# Patient Record
Sex: FEMALE | Race: WHITE | NOT HISPANIC OR LATINO | URBAN - METROPOLITAN AREA
[De-identification: names, ages, dates, MRNs, and addresses within clinical notes are randomized per-mention and may not be internally consistent; named-entity substitution may affect disease eponyms.]

---

## 2017-01-10 ENCOUNTER — OFFICE VISIT - HEALTHEAST (OUTPATIENT)
Dept: GERIATRICS | Facility: CLINIC | Age: 82
End: 2017-01-10

## 2017-01-10 DIAGNOSIS — N18.4 CKD (CHRONIC KIDNEY DISEASE), STAGE 4 (SEVERE): ICD-10-CM

## 2017-01-10 DIAGNOSIS — D64.9 ANEMIA, UNSPECIFIED TYPE: ICD-10-CM

## 2017-01-10 DIAGNOSIS — R35.0 URINARY FREQUENCY: ICD-10-CM

## 2017-01-10 DIAGNOSIS — E11.9 DIABETES MELLITUS (H): ICD-10-CM

## 2017-01-10 DIAGNOSIS — F31.9 BIPOLAR DISORDER (H): ICD-10-CM

## 2017-01-10 DIAGNOSIS — R60.0 BILATERAL EDEMA OF LOWER EXTREMITY: ICD-10-CM

## 2017-01-10 DIAGNOSIS — I10 ESSENTIAL HYPERTENSION: ICD-10-CM

## 2017-03-06 ENCOUNTER — OFFICE VISIT - HEALTHEAST (OUTPATIENT)
Dept: GERIATRICS | Facility: CLINIC | Age: 82
End: 2017-03-06

## 2017-03-06 DIAGNOSIS — F31.9 BIPOLAR DISORDER (H): ICD-10-CM

## 2017-03-06 DIAGNOSIS — E11.9 DIABETES MELLITUS (H): ICD-10-CM

## 2017-03-06 DIAGNOSIS — I10 ESSENTIAL HYPERTENSION: ICD-10-CM

## 2017-03-06 DIAGNOSIS — N18.4 CKD (CHRONIC KIDNEY DISEASE), STAGE 4 (SEVERE): ICD-10-CM

## 2017-03-06 DIAGNOSIS — D64.9 ANEMIA: ICD-10-CM

## 2017-03-06 DIAGNOSIS — R60.0 LEG EDEMA: ICD-10-CM

## 2017-03-06 DIAGNOSIS — M10.9 GOUT: ICD-10-CM

## 2017-04-10 ENCOUNTER — OFFICE VISIT - HEALTHEAST (OUTPATIENT)
Dept: BEHAVIORAL HEALTH | Facility: HOSPITAL | Age: 82
End: 2017-04-10

## 2017-04-10 DIAGNOSIS — F31.9 BIPOLAR 1 DISORDER (H): ICD-10-CM

## 2017-04-10 DIAGNOSIS — E11.9 DM (DIABETES MELLITUS) (H): ICD-10-CM

## 2017-04-10 DIAGNOSIS — F30.4 BIPOLAR I DISORDER, SINGLE MANIC EPISODE, IN FULL REMISSION (H): ICD-10-CM

## 2017-04-10 DIAGNOSIS — F43.21 ADJUSTMENT DISORDER WITH DEPRESSED MOOD: ICD-10-CM

## 2017-05-01 ENCOUNTER — OFFICE VISIT - HEALTHEAST (OUTPATIENT)
Dept: GERIATRICS | Facility: CLINIC | Age: 82
End: 2017-05-01

## 2017-05-01 DIAGNOSIS — E11.9 DIABETES MELLITUS (H): ICD-10-CM

## 2017-05-15 ENCOUNTER — OFFICE VISIT - HEALTHEAST (OUTPATIENT)
Dept: GERIATRICS | Facility: CLINIC | Age: 82
End: 2017-05-15

## 2017-05-15 DIAGNOSIS — N18.4 CKD (CHRONIC KIDNEY DISEASE), STAGE 4 (SEVERE): ICD-10-CM

## 2017-05-15 DIAGNOSIS — D64.9 ANEMIA, UNSPECIFIED TYPE: ICD-10-CM

## 2017-05-15 DIAGNOSIS — E11.9 DIABETES MELLITUS (H): ICD-10-CM

## 2017-05-15 DIAGNOSIS — R60.0 BILATERAL EDEMA OF LOWER EXTREMITY: ICD-10-CM

## 2017-05-15 DIAGNOSIS — F31.9 BIPOLAR DISORDER (H): ICD-10-CM

## 2017-05-15 DIAGNOSIS — I10 ESSENTIAL HYPERTENSION WITH GOAL BLOOD PRESSURE LESS THAN 140/90: ICD-10-CM

## 2017-06-05 ENCOUNTER — OFFICE VISIT - HEALTHEAST (OUTPATIENT)
Dept: GERIATRICS | Facility: CLINIC | Age: 82
End: 2017-06-05

## 2017-06-05 DIAGNOSIS — N18.4 CKD (CHRONIC KIDNEY DISEASE), STAGE 4 (SEVERE): ICD-10-CM

## 2017-06-05 DIAGNOSIS — D64.9 ANEMIA: ICD-10-CM

## 2017-06-05 DIAGNOSIS — M10.9 GOUT: ICD-10-CM

## 2017-06-05 DIAGNOSIS — R60.0 BILATERAL EDEMA OF LOWER EXTREMITY: ICD-10-CM

## 2017-06-05 DIAGNOSIS — I10 ESSENTIAL HYPERTENSION: ICD-10-CM

## 2017-06-05 DIAGNOSIS — E11.9 DIABETES MELLITUS (H): ICD-10-CM

## 2017-06-05 DIAGNOSIS — F31.9 BIPOLAR DISORDER (H): ICD-10-CM

## 2017-07-11 ENCOUNTER — OFFICE VISIT - HEALTHEAST (OUTPATIENT)
Dept: GERIATRICS | Facility: CLINIC | Age: 82
End: 2017-07-11

## 2017-07-11 DIAGNOSIS — L57.0 ACTINIC KERATOSIS: ICD-10-CM

## 2017-07-11 DIAGNOSIS — D64.9 ANEMIA: ICD-10-CM

## 2017-07-11 DIAGNOSIS — N18.4 CKD (CHRONIC KIDNEY DISEASE), STAGE 4 (SEVERE): ICD-10-CM

## 2017-07-11 DIAGNOSIS — M10.9 GOUT: ICD-10-CM

## 2017-07-11 DIAGNOSIS — I10 ESSENTIAL HYPERTENSION: ICD-10-CM

## 2017-07-11 DIAGNOSIS — E11.9 DIABETES MELLITUS (H): ICD-10-CM

## 2017-07-11 DIAGNOSIS — F31.9 BIPOLAR DISORDER (H): ICD-10-CM

## 2017-07-11 DIAGNOSIS — R60.0 BILATERAL EDEMA OF LOWER EXTREMITY: ICD-10-CM

## 2017-07-11 DIAGNOSIS — I10 HYPERTENSION: ICD-10-CM

## 2017-07-31 ENCOUNTER — RECORDS - HEALTHEAST (OUTPATIENT)
Dept: LAB | Facility: CLINIC | Age: 82
End: 2017-07-31

## 2017-07-31 LAB — HBA1C MFR BLD: 7 % (ref 4.2–6.1)

## 2017-08-18 ENCOUNTER — OFFICE VISIT - HEALTHEAST (OUTPATIENT)
Dept: GERIATRICS | Facility: CLINIC | Age: 82
End: 2017-08-18

## 2017-08-18 DIAGNOSIS — E11.9 DIABETES MELLITUS (H): ICD-10-CM

## 2017-08-18 DIAGNOSIS — D64.9 ANEMIA, UNSPECIFIED TYPE: ICD-10-CM

## 2017-08-18 DIAGNOSIS — R60.0 BILATERAL EDEMA OF LOWER EXTREMITY: ICD-10-CM

## 2017-08-18 DIAGNOSIS — N18.4 CKD (CHRONIC KIDNEY DISEASE), STAGE 4 (SEVERE): ICD-10-CM

## 2017-08-18 DIAGNOSIS — F31.9 BIPOLAR DISORDER (H): ICD-10-CM

## 2017-08-18 DIAGNOSIS — I10 ESSENTIAL HYPERTENSION: ICD-10-CM

## 2017-09-25 ENCOUNTER — OFFICE VISIT - HEALTHEAST (OUTPATIENT)
Dept: GERIATRICS | Facility: CLINIC | Age: 82
End: 2017-09-25

## 2017-09-25 DIAGNOSIS — N18.4 CKD (CHRONIC KIDNEY DISEASE), STAGE 4 (SEVERE): ICD-10-CM

## 2017-09-25 DIAGNOSIS — E11.9 DIABETES MELLITUS (H): ICD-10-CM

## 2017-09-25 DIAGNOSIS — F31.9 BIPOLAR DISORDER (H): ICD-10-CM

## 2017-09-25 DIAGNOSIS — I10 HYPERTENSION: ICD-10-CM

## 2017-09-25 DIAGNOSIS — L57.0 ACTINIC KERATOSIS: ICD-10-CM

## 2017-09-25 DIAGNOSIS — M19.90 DJD (DEGENERATIVE JOINT DISEASE): ICD-10-CM

## 2017-09-25 DIAGNOSIS — M10.9 GOUT: ICD-10-CM

## 2017-09-25 DIAGNOSIS — R60.0 BILATERAL EDEMA OF LOWER EXTREMITY: ICD-10-CM

## 2017-10-09 ENCOUNTER — OFFICE VISIT - HEALTHEAST (OUTPATIENT)
Dept: BEHAVIORAL HEALTH | Facility: HOSPITAL | Age: 82
End: 2017-10-09

## 2017-10-09 DIAGNOSIS — F30.4 BIPOLAR I DISORDER, SINGLE MANIC EPISODE, IN FULL REMISSION (H): ICD-10-CM

## 2017-10-09 DIAGNOSIS — F31.9 BIPOLAR 1 DISORDER (H): ICD-10-CM

## 2017-10-09 DIAGNOSIS — F31.11 BIPOLAR 1 DISORDER, MANIC, MILD (H): ICD-10-CM

## 2017-10-25 ENCOUNTER — OFFICE VISIT - HEALTHEAST (OUTPATIENT)
Dept: GERIATRICS | Facility: CLINIC | Age: 82
End: 2017-10-25

## 2017-10-25 DIAGNOSIS — M25.569 KNEE PAIN: ICD-10-CM

## 2017-10-25 DIAGNOSIS — I10 HYPERTENSION: ICD-10-CM

## 2017-10-25 DIAGNOSIS — M19.90 DJD (DEGENERATIVE JOINT DISEASE): ICD-10-CM

## 2017-10-25 DIAGNOSIS — N18.4 CHRONIC KIDNEY DISEASE, STAGE IV (SEVERE) (H): ICD-10-CM

## 2017-10-25 DIAGNOSIS — M10.9 GOUT: ICD-10-CM

## 2017-10-25 DIAGNOSIS — R60.0 BILATERAL EDEMA OF LOWER EXTREMITY: ICD-10-CM

## 2017-10-25 DIAGNOSIS — E11.9 DIABETES MELLITUS (H): ICD-10-CM

## 2017-10-25 DIAGNOSIS — F31.9 BIPOLAR DISORDER (H): ICD-10-CM

## 2017-11-22 ENCOUNTER — OFFICE VISIT - HEALTHEAST (OUTPATIENT)
Dept: GERIATRICS | Facility: CLINIC | Age: 82
End: 2017-11-22

## 2017-11-22 DIAGNOSIS — R60.0 BILATERAL EDEMA OF LOWER EXTREMITY: ICD-10-CM

## 2017-11-22 DIAGNOSIS — M19.90 DJD (DEGENERATIVE JOINT DISEASE): ICD-10-CM

## 2017-11-22 DIAGNOSIS — E11.9 DIABETES MELLITUS (H): ICD-10-CM

## 2017-11-22 DIAGNOSIS — M25.569 KNEE PAIN: ICD-10-CM

## 2017-11-22 DIAGNOSIS — F31.9 BIPOLAR DISORDER (H): ICD-10-CM

## 2017-11-22 DIAGNOSIS — I10 HYPERTENSION: ICD-10-CM

## 2017-12-04 ENCOUNTER — RECORDS - HEALTHEAST (OUTPATIENT)
Dept: LAB | Facility: CLINIC | Age: 82
End: 2017-12-04

## 2017-12-04 LAB — HBA1C MFR BLD: 6.6 % (ref 4.2–6.1)

## 2017-12-11 ENCOUNTER — OFFICE VISIT - HEALTHEAST (OUTPATIENT)
Dept: GERIATRICS | Facility: CLINIC | Age: 82
End: 2017-12-11

## 2017-12-11 DIAGNOSIS — M10.9 GOUT: ICD-10-CM

## 2017-12-11 DIAGNOSIS — F31.9 BIPOLAR DISORDER (H): ICD-10-CM

## 2017-12-11 DIAGNOSIS — M25.569 KNEE PAIN: ICD-10-CM

## 2017-12-11 DIAGNOSIS — E11.9 DIABETES MELLITUS (H): ICD-10-CM

## 2017-12-11 DIAGNOSIS — N18.4 CHRONIC KIDNEY DISEASE, STAGE IV (SEVERE) (H): ICD-10-CM

## 2017-12-11 DIAGNOSIS — M19.90 DJD (DEGENERATIVE JOINT DISEASE): ICD-10-CM

## 2017-12-11 DIAGNOSIS — I10 HYPERTENSION: ICD-10-CM

## 2018-02-12 ENCOUNTER — OFFICE VISIT - HEALTHEAST (OUTPATIENT)
Dept: GERIATRICS | Facility: CLINIC | Age: 83
End: 2018-02-12

## 2018-02-12 DIAGNOSIS — F31.9 BIPOLAR DISORDER (H): ICD-10-CM

## 2018-02-12 DIAGNOSIS — I10 ESSENTIAL HYPERTENSION: ICD-10-CM

## 2018-02-12 DIAGNOSIS — M19.90 DJD (DEGENERATIVE JOINT DISEASE): ICD-10-CM

## 2018-02-12 DIAGNOSIS — I10 HYPERTENSION: ICD-10-CM

## 2018-02-12 DIAGNOSIS — E11.9 DIABETES MELLITUS (H): ICD-10-CM

## 2018-02-12 DIAGNOSIS — M10.9 GOUT: ICD-10-CM

## 2018-02-12 DIAGNOSIS — N18.4 CHRONIC KIDNEY DISEASE, STAGE IV (SEVERE) (H): ICD-10-CM

## 2018-02-12 DIAGNOSIS — M25.569 KNEE PAIN: ICD-10-CM

## 2018-02-17 ENCOUNTER — RECORDS - HEALTHEAST (OUTPATIENT)
Dept: LAB | Facility: CLINIC | Age: 83
End: 2018-02-17

## 2018-02-19 LAB
ANION GAP SERPL CALCULATED.3IONS-SCNC: 8 MMOL/L (ref 5–18)
BUN SERPL-MCNC: 34 MG/DL (ref 8–28)
CALCIUM SERPL-MCNC: 8.8 MG/DL (ref 8.5–10.5)
CHLORIDE BLD-SCNC: 108 MMOL/L (ref 98–107)
CO2 SERPL-SCNC: 27 MMOL/L (ref 22–31)
CREAT SERPL-MCNC: 1.75 MG/DL (ref 0.6–1.1)
ERYTHROCYTE [DISTWIDTH] IN BLOOD BY AUTOMATED COUNT: 14.2 % (ref 11–14.5)
GFR SERPL CREATININE-BSD FRML MDRD: 28 ML/MIN/1.73M2
GLUCOSE BLD-MCNC: 86 MG/DL (ref 70–125)
HBA1C MFR BLD: 6.6 % (ref 4.2–6.1)
HCT VFR BLD AUTO: 33.8 % (ref 35–47)
HGB BLD-MCNC: 10.2 G/DL (ref 12–16)
MCH RBC QN AUTO: 31.1 PG (ref 27–34)
MCHC RBC AUTO-ENTMCNC: 30.2 G/DL (ref 32–36)
MCV RBC AUTO: 103 FL (ref 80–100)
PLATELET # BLD AUTO: 190 THOU/UL (ref 140–440)
PMV BLD AUTO: 11.8 FL (ref 8.5–12.5)
POTASSIUM BLD-SCNC: 5.3 MMOL/L (ref 3.5–5)
RBC # BLD AUTO: 3.28 MILL/UL (ref 3.8–5.4)
SODIUM SERPL-SCNC: 143 MMOL/L (ref 136–145)
WBC: 8.7 THOU/UL (ref 4–11)

## 2018-02-22 ENCOUNTER — RECORDS - HEALTHEAST (OUTPATIENT)
Dept: LAB | Facility: CLINIC | Age: 83
End: 2018-02-22

## 2018-02-22 LAB — POTASSIUM BLD-SCNC: 5.1 MMOL/L (ref 3.5–5)

## 2018-03-07 ENCOUNTER — RECORDS - HEALTHEAST (OUTPATIENT)
Dept: LAB | Facility: CLINIC | Age: 83
End: 2018-03-07

## 2018-03-08 LAB — POTASSIUM BLD-SCNC: 4.8 MMOL/L (ref 3.5–5)

## 2018-03-14 ENCOUNTER — OFFICE VISIT - HEALTHEAST (OUTPATIENT)
Dept: GERIATRICS | Facility: CLINIC | Age: 83
End: 2018-03-14

## 2018-03-14 DIAGNOSIS — B35.3 ATHLETE'S FOOT ON RIGHT: ICD-10-CM

## 2018-03-21 ENCOUNTER — OFFICE VISIT - HEALTHEAST (OUTPATIENT)
Dept: GERIATRICS | Facility: CLINIC | Age: 83
End: 2018-03-21

## 2018-03-21 DIAGNOSIS — F31.9 BIPOLAR DISORDER (H): ICD-10-CM

## 2018-03-21 DIAGNOSIS — B35.3 ATHLETE'S FOOT ON RIGHT: ICD-10-CM

## 2018-03-21 DIAGNOSIS — I10 ESSENTIAL HYPERTENSION: ICD-10-CM

## 2018-03-21 DIAGNOSIS — R60.0 BILATERAL EDEMA OF LOWER EXTREMITY: ICD-10-CM

## 2018-03-21 DIAGNOSIS — N18.4 CHRONIC KIDNEY DISEASE, STAGE IV (SEVERE) (H): ICD-10-CM

## 2018-03-21 DIAGNOSIS — M10.9 GOUT: ICD-10-CM

## 2018-03-21 DIAGNOSIS — E11.9 DIABETES MELLITUS (H): ICD-10-CM

## 2018-04-02 ENCOUNTER — OFFICE VISIT - HEALTHEAST (OUTPATIENT)
Dept: GERIATRICS | Facility: CLINIC | Age: 83
End: 2018-04-02

## 2018-04-02 DIAGNOSIS — B35.3 ATHLETE'S FOOT ON RIGHT: ICD-10-CM

## 2018-04-02 DIAGNOSIS — E11.9 DIABETES MELLITUS (H): ICD-10-CM

## 2018-04-02 DIAGNOSIS — R60.0 BILATERAL EDEMA OF LOWER EXTREMITY: ICD-10-CM

## 2018-04-09 ENCOUNTER — OFFICE VISIT - HEALTHEAST (OUTPATIENT)
Dept: BEHAVIORAL HEALTH | Facility: HOSPITAL | Age: 83
End: 2018-04-09

## 2018-04-09 DIAGNOSIS — F30.4 BIPOLAR I DISORDER, SINGLE MANIC EPISODE, IN FULL REMISSION (H): ICD-10-CM

## 2018-04-09 DIAGNOSIS — F31.9 BIPOLAR 1 DISORDER (H): ICD-10-CM

## 2018-04-09 DIAGNOSIS — F31.11 BIPOLAR 1 DISORDER, MANIC, MILD (H): ICD-10-CM

## 2018-04-11 ENCOUNTER — OFFICE VISIT - HEALTHEAST (OUTPATIENT)
Dept: GERIATRICS | Facility: CLINIC | Age: 83
End: 2018-04-11

## 2018-04-11 DIAGNOSIS — B35.3 ATHLETE'S FOOT ON RIGHT: ICD-10-CM

## 2018-05-04 ENCOUNTER — AMBULATORY - HEALTHEAST (OUTPATIENT)
Dept: GERIATRICS | Facility: CLINIC | Age: 83
End: 2018-05-04

## 2018-05-31 ENCOUNTER — RECORDS - HEALTHEAST (OUTPATIENT)
Dept: LAB | Facility: CLINIC | Age: 83
End: 2018-05-31

## 2018-05-31 LAB
ALBUMIN UR-MCNC: ABNORMAL MG/DL
APPEARANCE UR: CLEAR
BACTERIA #/AREA URNS HPF: ABNORMAL HPF
BILIRUB UR QL STRIP: NEGATIVE
COLOR UR AUTO: YELLOW
GLUCOSE UR STRIP-MCNC: NEGATIVE MG/DL
HGB UR QL STRIP: ABNORMAL
KETONES UR STRIP-MCNC: NEGATIVE MG/DL
LEUKOCYTE ESTERASE UR QL STRIP: ABNORMAL
MUCOUS THREADS #/AREA URNS LPF: ABNORMAL LPF
NITRATE UR QL: NEGATIVE
PH UR STRIP: 5.5 [PH] (ref 4.5–8)
RBC #/AREA URNS AUTO: ABNORMAL HPF
SP GR UR STRIP: 1.02 (ref 1–1.03)
SQUAMOUS #/AREA URNS AUTO: ABNORMAL LPF
UROBILINOGEN UR STRIP-ACNC: ABNORMAL
WBC #/AREA URNS AUTO: ABNORMAL HPF
WBC CLUMPS #/AREA URNS HPF: PRESENT /[HPF]

## 2018-06-01 ENCOUNTER — RECORDS - HEALTHEAST (OUTPATIENT)
Dept: LAB | Facility: CLINIC | Age: 83
End: 2018-06-01

## 2018-06-01 ENCOUNTER — AMBULATORY - HEALTHEAST (OUTPATIENT)
Dept: GERIATRICS | Facility: CLINIC | Age: 83
End: 2018-06-01

## 2018-06-01 LAB
ANION GAP SERPL CALCULATED.3IONS-SCNC: 9 MMOL/L (ref 5–18)
BUN SERPL-MCNC: 40 MG/DL (ref 8–28)
CALCIUM SERPL-MCNC: 9 MG/DL (ref 8.5–10.5)
CHLORIDE BLD-SCNC: 110 MMOL/L (ref 98–107)
CO2 SERPL-SCNC: 27 MMOL/L (ref 22–31)
CREAT SERPL-MCNC: 1.89 MG/DL (ref 0.6–1.1)
ERYTHROCYTE [DISTWIDTH] IN BLOOD BY AUTOMATED COUNT: 13.3 % (ref 11–14.5)
GFR SERPL CREATININE-BSD FRML MDRD: 25 ML/MIN/1.73M2
GLUCOSE BLD-MCNC: 86 MG/DL (ref 70–125)
HCT VFR BLD AUTO: 32.3 % (ref 35–47)
HGB BLD-MCNC: 10.1 G/DL (ref 12–16)
MCH RBC QN AUTO: 31.8 PG (ref 27–34)
MCHC RBC AUTO-ENTMCNC: 31.3 G/DL (ref 32–36)
MCV RBC AUTO: 102 FL (ref 80–100)
PLATELET # BLD AUTO: 208 THOU/UL (ref 140–440)
PMV BLD AUTO: 11.7 FL (ref 8.5–12.5)
POTASSIUM BLD-SCNC: 4.7 MMOL/L (ref 3.5–5)
RBC # BLD AUTO: 3.18 MILL/UL (ref 3.8–5.4)
SODIUM SERPL-SCNC: 146 MMOL/L (ref 136–145)
TSH SERPL DL<=0.005 MIU/L-ACNC: 1.68 UIU/ML (ref 0.3–5)
WBC: 8.8 THOU/UL (ref 4–11)

## 2018-06-03 LAB — BACTERIA SPEC CULT: ABNORMAL

## 2018-06-11 ENCOUNTER — OFFICE VISIT - HEALTHEAST (OUTPATIENT)
Dept: GERIATRICS | Facility: CLINIC | Age: 83
End: 2018-06-11

## 2018-06-11 DIAGNOSIS — B35.3 ATHLETE'S FOOT ON RIGHT: ICD-10-CM

## 2018-06-11 DIAGNOSIS — N39.0 URINARY TRACT INFECTION: ICD-10-CM

## 2018-06-22 ENCOUNTER — COMMUNICATION - HEALTHEAST (OUTPATIENT)
Dept: BEHAVIORAL HEALTH | Facility: CLINIC | Age: 83
End: 2018-06-22

## 2018-06-22 DIAGNOSIS — F31.9 BIPOLAR AFFECTIVE DISORDER (H): ICD-10-CM

## 2018-06-27 ENCOUNTER — COMMUNICATION - HEALTHEAST (OUTPATIENT)
Dept: BEHAVIORAL HEALTH | Facility: CLINIC | Age: 83
End: 2018-06-27

## 2018-07-30 ENCOUNTER — OFFICE VISIT - HEALTHEAST (OUTPATIENT)
Dept: GERIATRICS | Facility: CLINIC | Age: 83
End: 2018-07-30

## 2018-07-30 DIAGNOSIS — R60.0 BILATERAL EDEMA OF LOWER EXTREMITY: ICD-10-CM

## 2018-07-30 DIAGNOSIS — L97.511 SKIN ULCER OF RIGHT FOOT, LIMITED TO BREAKDOWN OF SKIN (H): ICD-10-CM

## 2018-07-30 DIAGNOSIS — E11.9 DIABETES MELLITUS (H): ICD-10-CM

## 2018-08-13 ENCOUNTER — OFFICE VISIT - HEALTHEAST (OUTPATIENT)
Dept: GERIATRICS | Facility: CLINIC | Age: 83
End: 2018-08-13

## 2018-08-13 DIAGNOSIS — E11.9 DIABETES MELLITUS (H): ICD-10-CM

## 2018-08-13 DIAGNOSIS — L97.511 SKIN ULCER OF RIGHT FOOT, LIMITED TO BREAKDOWN OF SKIN (H): ICD-10-CM

## 2018-08-13 DIAGNOSIS — B35.3 TINEA PEDIS: ICD-10-CM

## 2018-08-27 ENCOUNTER — OFFICE VISIT - HEALTHEAST (OUTPATIENT)
Dept: GERIATRICS | Facility: CLINIC | Age: 83
End: 2018-08-27

## 2018-08-27 ENCOUNTER — OFFICE VISIT - HEALTHEAST (OUTPATIENT)
Dept: BEHAVIORAL HEALTH | Facility: HOSPITAL | Age: 83
End: 2018-08-27

## 2018-08-27 ENCOUNTER — RECORDS - HEALTHEAST (OUTPATIENT)
Dept: LAB | Facility: CLINIC | Age: 83
End: 2018-08-27

## 2018-08-27 DIAGNOSIS — L97.511 SKIN ULCER OF RIGHT FOOT, LIMITED TO BREAKDOWN OF SKIN (H): ICD-10-CM

## 2018-08-27 DIAGNOSIS — B35.3 TINEA PEDIS: ICD-10-CM

## 2018-08-27 DIAGNOSIS — F31.11 BIPOLAR 1 DISORDER, MANIC, MILD (H): ICD-10-CM

## 2018-08-27 DIAGNOSIS — F30.4 BIPOLAR I DISORDER, SINGLE MANIC EPISODE, IN FULL REMISSION (H): ICD-10-CM

## 2018-08-29 ENCOUNTER — OFFICE VISIT - HEALTHEAST (OUTPATIENT)
Dept: GERIATRICS | Facility: CLINIC | Age: 83
End: 2018-08-29

## 2018-08-29 DIAGNOSIS — B35.3 TINEA PEDIS: ICD-10-CM

## 2018-08-29 DIAGNOSIS — E11.9 DIABETES MELLITUS (H): ICD-10-CM

## 2018-08-29 DIAGNOSIS — L97.511 SKIN ULCER OF RIGHT FOOT, LIMITED TO BREAKDOWN OF SKIN (H): ICD-10-CM

## 2018-08-29 LAB — BACTERIA SPEC CULT: ABNORMAL

## 2018-08-30 ENCOUNTER — OFFICE VISIT - HEALTHEAST (OUTPATIENT)
Dept: GERIATRICS | Facility: CLINIC | Age: 83
End: 2018-08-30

## 2018-08-30 DIAGNOSIS — N18.4 CHRONIC KIDNEY DISEASE, STAGE IV (SEVERE) (H): ICD-10-CM

## 2018-08-30 DIAGNOSIS — L97.511 SKIN ULCER OF RIGHT FOOT, LIMITED TO BREAKDOWN OF SKIN (H): ICD-10-CM

## 2018-08-30 DIAGNOSIS — R60.0 BILATERAL EDEMA OF LOWER EXTREMITY: ICD-10-CM

## 2018-08-30 DIAGNOSIS — E11.9 DIABETES MELLITUS (H): ICD-10-CM

## 2018-08-30 DIAGNOSIS — F31.9 BIPOLAR DISORDER (H): ICD-10-CM

## 2018-08-30 DIAGNOSIS — B35.3 TINEA PEDIS: ICD-10-CM

## 2018-09-05 ENCOUNTER — OFFICE VISIT - HEALTHEAST (OUTPATIENT)
Dept: GERIATRICS | Facility: CLINIC | Age: 83
End: 2018-09-05

## 2018-09-05 ENCOUNTER — RECORDS - HEALTHEAST (OUTPATIENT)
Dept: LAB | Facility: CLINIC | Age: 83
End: 2018-09-05

## 2018-09-05 DIAGNOSIS — L08.9 DIABETIC FOOT INFECTION (H): ICD-10-CM

## 2018-09-05 DIAGNOSIS — E11.628 DIABETIC FOOT INFECTION (H): ICD-10-CM

## 2018-09-05 DIAGNOSIS — L97.511 SKIN ULCER OF RIGHT FOOT, LIMITED TO BREAKDOWN OF SKIN (H): ICD-10-CM

## 2018-09-05 DIAGNOSIS — B35.3 TINEA PEDIS: ICD-10-CM

## 2018-09-06 LAB
ANION GAP SERPL CALCULATED.3IONS-SCNC: 6 MMOL/L (ref 5–18)
BUN SERPL-MCNC: 40 MG/DL (ref 8–28)
CALCIUM SERPL-MCNC: 9 MG/DL (ref 8.5–10.5)
CHLORIDE BLD-SCNC: 112 MMOL/L (ref 98–107)
CO2 SERPL-SCNC: 27 MMOL/L (ref 22–31)
CREAT SERPL-MCNC: 1.64 MG/DL (ref 0.6–1.1)
GFR SERPL CREATININE-BSD FRML MDRD: 30 ML/MIN/1.73M2
GLUCOSE BLD-MCNC: 100 MG/DL (ref 70–125)
HBA1C MFR BLD: 6.7 % (ref 4.2–6.1)
POTASSIUM BLD-SCNC: 4.6 MMOL/L (ref 3.5–5)
SODIUM SERPL-SCNC: 145 MMOL/L (ref 136–145)

## 2018-09-07 ENCOUNTER — COMMUNICATION - HEALTHEAST (OUTPATIENT)
Dept: GERIATRICS | Facility: CLINIC | Age: 83
End: 2018-09-07

## 2018-09-12 ENCOUNTER — OFFICE VISIT - HEALTHEAST (OUTPATIENT)
Dept: GERIATRICS | Facility: CLINIC | Age: 83
End: 2018-09-12

## 2018-09-12 DIAGNOSIS — L97.511 SKIN ULCER OF RIGHT FOOT, LIMITED TO BREAKDOWN OF SKIN (H): ICD-10-CM

## 2018-09-12 DIAGNOSIS — L08.9 DIABETIC FOOT INFECTION (H): ICD-10-CM

## 2018-09-12 DIAGNOSIS — E11.628 DIABETIC FOOT INFECTION (H): ICD-10-CM

## 2018-09-17 ENCOUNTER — RECORDS - HEALTHEAST (OUTPATIENT)
Dept: LAB | Facility: CLINIC | Age: 83
End: 2018-09-17

## 2018-09-17 LAB
ANION GAP SERPL CALCULATED.3IONS-SCNC: 10 MMOL/L (ref 5–18)
BUN SERPL-MCNC: 34 MG/DL (ref 8–28)
CALCIUM SERPL-MCNC: 9.4 MG/DL (ref 8.5–10.5)
CHLORIDE BLD-SCNC: 107 MMOL/L (ref 98–107)
CO2 SERPL-SCNC: 26 MMOL/L (ref 22–31)
CREAT SERPL-MCNC: 1.96 MG/DL (ref 0.6–1.1)
ERYTHROCYTE [DISTWIDTH] IN BLOOD BY AUTOMATED COUNT: 13.5 % (ref 11–14.5)
GFR SERPL CREATININE-BSD FRML MDRD: 24 ML/MIN/1.73M2
GLUCOSE BLD-MCNC: 103 MG/DL (ref 70–125)
HCT VFR BLD AUTO: 37.6 % (ref 35–47)
HGB BLD-MCNC: 11.5 G/DL (ref 12–16)
MCH RBC QN AUTO: 30.2 PG (ref 27–34)
MCHC RBC AUTO-ENTMCNC: 30.6 G/DL (ref 32–36)
MCV RBC AUTO: 99 FL (ref 80–100)
PLATELET # BLD AUTO: 233 THOU/UL (ref 140–440)
PMV BLD AUTO: 11.9 FL (ref 8.5–12.5)
POTASSIUM BLD-SCNC: 3.6 MMOL/L (ref 3.5–5)
RBC # BLD AUTO: 3.81 MILL/UL (ref 3.8–5.4)
SODIUM SERPL-SCNC: 143 MMOL/L (ref 136–145)
WBC: 8.5 THOU/UL (ref 4–11)

## 2018-09-19 ENCOUNTER — OFFICE VISIT - HEALTHEAST (OUTPATIENT)
Dept: GERIATRICS | Facility: CLINIC | Age: 83
End: 2018-09-19

## 2018-09-19 DIAGNOSIS — E11.628 DIABETIC FOOT INFECTION (H): ICD-10-CM

## 2018-09-19 DIAGNOSIS — B35.3 TINEA PEDIS: ICD-10-CM

## 2018-09-19 DIAGNOSIS — L97.511 SKIN ULCER OF RIGHT FOOT, LIMITED TO BREAKDOWN OF SKIN (H): ICD-10-CM

## 2018-09-19 DIAGNOSIS — L08.9 DIABETIC FOOT INFECTION (H): ICD-10-CM

## 2018-10-01 ENCOUNTER — OFFICE VISIT - HEALTHEAST (OUTPATIENT)
Dept: GERIATRICS | Facility: CLINIC | Age: 83
End: 2018-10-01

## 2018-10-01 DIAGNOSIS — E11.628 DIABETIC FOOT INFECTION (H): ICD-10-CM

## 2018-10-01 DIAGNOSIS — L97.511 SKIN ULCER OF RIGHT FOOT, LIMITED TO BREAKDOWN OF SKIN (H): ICD-10-CM

## 2018-10-01 DIAGNOSIS — B35.3 TINEA PEDIS: ICD-10-CM

## 2018-10-01 DIAGNOSIS — L08.9 DIABETIC FOOT INFECTION (H): ICD-10-CM

## 2018-10-10 ENCOUNTER — OFFICE VISIT - HEALTHEAST (OUTPATIENT)
Dept: GERIATRICS | Facility: CLINIC | Age: 83
End: 2018-10-10

## 2018-10-10 DIAGNOSIS — L08.9 DIABETIC FOOT INFECTION (H): ICD-10-CM

## 2018-10-10 DIAGNOSIS — L97.511 SKIN ULCER OF RIGHT FOOT, LIMITED TO BREAKDOWN OF SKIN (H): ICD-10-CM

## 2018-10-10 DIAGNOSIS — E11.628 DIABETIC FOOT INFECTION (H): ICD-10-CM

## 2018-10-22 ENCOUNTER — RECORDS - HEALTHEAST (OUTPATIENT)
Dept: LAB | Facility: CLINIC | Age: 83
End: 2018-10-22

## 2018-10-22 ENCOUNTER — OFFICE VISIT - HEALTHEAST (OUTPATIENT)
Dept: GERIATRICS | Facility: CLINIC | Age: 83
End: 2018-10-22

## 2018-10-22 DIAGNOSIS — B35.3 TINEA PEDIS: ICD-10-CM

## 2018-10-22 DIAGNOSIS — L08.9 DIABETIC FOOT INFECTION (H): ICD-10-CM

## 2018-10-22 DIAGNOSIS — L97.511 SKIN ULCER OF RIGHT FOOT, LIMITED TO BREAKDOWN OF SKIN (H): ICD-10-CM

## 2018-10-22 DIAGNOSIS — E11.628 DIABETIC FOOT INFECTION (H): ICD-10-CM

## 2018-10-24 ENCOUNTER — OFFICE VISIT - HEALTHEAST (OUTPATIENT)
Dept: GERIATRICS | Facility: CLINIC | Age: 83
End: 2018-10-24

## 2018-10-24 DIAGNOSIS — E11.9 DIABETES MELLITUS (H): ICD-10-CM

## 2018-10-24 DIAGNOSIS — E11.628 DIABETIC FOOT INFECTION (H): ICD-10-CM

## 2018-10-24 DIAGNOSIS — L08.9 DIABETIC FOOT INFECTION (H): ICD-10-CM

## 2018-10-24 DIAGNOSIS — B35.3 TINEA PEDIS: ICD-10-CM

## 2018-10-25 LAB
BACTERIA SPEC CULT: ABNORMAL
BACTERIA SPEC CULT: ABNORMAL

## 2018-10-29 ENCOUNTER — RECORDS - HEALTHEAST (OUTPATIENT)
Dept: LAB | Facility: CLINIC | Age: 83
End: 2018-10-29

## 2018-10-29 LAB
ANION GAP SERPL CALCULATED.3IONS-SCNC: 7 MMOL/L (ref 5–18)
BUN SERPL-MCNC: 28 MG/DL (ref 8–28)
CALCIUM SERPL-MCNC: 8.7 MG/DL (ref 8.5–10.5)
CHLORIDE BLD-SCNC: 107 MMOL/L (ref 98–107)
CO2 SERPL-SCNC: 28 MMOL/L (ref 22–31)
CREAT SERPL-MCNC: 1.4 MG/DL (ref 0.6–1.1)
ERYTHROCYTE [DISTWIDTH] IN BLOOD BY AUTOMATED COUNT: 13.2 % (ref 11–14.5)
GFR SERPL CREATININE-BSD FRML MDRD: 36 ML/MIN/1.73M2
GLUCOSE BLD-MCNC: 87 MG/DL (ref 70–125)
HCT VFR BLD AUTO: 32 % (ref 35–47)
HGB BLD-MCNC: 9.9 G/DL (ref 12–16)
MCH RBC QN AUTO: 30.2 PG (ref 27–34)
MCHC RBC AUTO-ENTMCNC: 30.9 G/DL (ref 32–36)
MCV RBC AUTO: 98 FL (ref 80–100)
PLATELET # BLD AUTO: 206 THOU/UL (ref 140–440)
PMV BLD AUTO: 11.9 FL (ref 8.5–12.5)
POTASSIUM BLD-SCNC: 4.7 MMOL/L (ref 3.5–5)
RBC # BLD AUTO: 3.28 MILL/UL (ref 3.8–5.4)
SODIUM SERPL-SCNC: 142 MMOL/L (ref 136–145)
URATE SERPL-MCNC: 6.9 MG/DL (ref 2–7.5)
WBC: 9.2 THOU/UL (ref 4–11)

## 2018-10-31 ENCOUNTER — OFFICE VISIT - HEALTHEAST (OUTPATIENT)
Dept: GERIATRICS | Facility: CLINIC | Age: 83
End: 2018-10-31

## 2018-10-31 DIAGNOSIS — L08.9 DIABETIC FOOT INFECTION (H): ICD-10-CM

## 2018-10-31 DIAGNOSIS — B35.3 TINEA PEDIS: ICD-10-CM

## 2018-10-31 DIAGNOSIS — E11.628 DIABETIC FOOT INFECTION (H): ICD-10-CM

## 2018-11-01 ENCOUNTER — RECORDS - HEALTHEAST (OUTPATIENT)
Dept: LAB | Facility: CLINIC | Age: 83
End: 2018-11-01

## 2018-11-01 LAB
C REACTIVE PROTEIN LHE: 0.6 MG/DL (ref 0–0.8)
ERYTHROCYTE [DISTWIDTH] IN BLOOD BY AUTOMATED COUNT: 13.4 % (ref 11–14.5)
ERYTHROCYTE [SEDIMENTATION RATE] IN BLOOD BY WESTERGREN METHOD: 76 MM/HR (ref 0–20)
HCT VFR BLD AUTO: 36.9 % (ref 35–47)
HGB BLD-MCNC: 10.9 G/DL (ref 12–16)
MCH RBC QN AUTO: 29.5 PG (ref 27–34)
MCHC RBC AUTO-ENTMCNC: 29.5 G/DL (ref 32–36)
MCV RBC AUTO: 100 FL (ref 80–100)
PLATELET # BLD AUTO: 224 THOU/UL (ref 140–440)
PMV BLD AUTO: 11.5 FL (ref 8.5–12.5)
RBC # BLD AUTO: 3.69 MILL/UL (ref 3.8–5.4)
WBC: 9 THOU/UL (ref 4–11)

## 2018-11-07 ENCOUNTER — OFFICE VISIT - HEALTHEAST (OUTPATIENT)
Dept: GERIATRICS | Facility: CLINIC | Age: 83
End: 2018-11-07

## 2018-11-07 DIAGNOSIS — E11.628 DIABETIC FOOT INFECTION (H): ICD-10-CM

## 2018-11-07 DIAGNOSIS — B35.3 TINEA PEDIS OF LEFT FOOT: ICD-10-CM

## 2018-11-07 DIAGNOSIS — L08.9 DIABETIC FOOT INFECTION (H): ICD-10-CM

## 2018-11-08 ENCOUNTER — OFFICE VISIT - HEALTHEAST (OUTPATIENT)
Dept: VASCULAR SURGERY | Facility: CLINIC | Age: 83
End: 2018-11-08

## 2018-11-08 ENCOUNTER — AMBULATORY - HEALTHEAST (OUTPATIENT)
Dept: VASCULAR SURGERY | Facility: CLINIC | Age: 83
End: 2018-11-08

## 2018-11-08 ENCOUNTER — RECORDS - HEALTHEAST (OUTPATIENT)
Dept: ADMINISTRATIVE | Facility: OTHER | Age: 83
End: 2018-11-08

## 2018-11-08 DIAGNOSIS — E11.59 TYPE 2 DIABETES MELLITUS WITH OTHER CIRCULATORY COMPLICATIONS (H): ICD-10-CM

## 2018-11-08 DIAGNOSIS — L03.90 CELLULITIS: ICD-10-CM

## 2018-11-08 DIAGNOSIS — R23.4 FISSURE IN SKIN OF FOOT: ICD-10-CM

## 2018-11-08 DIAGNOSIS — E11.9 DIABETES MELLITUS, TYPE 2 (H): ICD-10-CM

## 2018-11-15 ENCOUNTER — AMBULATORY - HEALTHEAST (OUTPATIENT)
Dept: ADMINISTRATIVE | Facility: CLINIC | Age: 83
End: 2018-11-15

## 2018-11-15 ENCOUNTER — RECORDS - HEALTHEAST (OUTPATIENT)
Dept: VASCULAR ULTRASOUND | Facility: CLINIC | Age: 83
End: 2018-11-15

## 2018-11-15 DIAGNOSIS — E11.9 TYPE 2 DIABETES MELLITUS WITHOUT COMPLICATIONS (H): ICD-10-CM

## 2018-11-15 DIAGNOSIS — R23.4 CHANGES IN SKIN TEXTURE: ICD-10-CM

## 2018-11-15 DIAGNOSIS — E11.59 TYPE 2 DIABETES MELLITUS WITH OTHER CIRCULATORY COMPLICATIONS (H): ICD-10-CM

## 2018-11-15 DIAGNOSIS — L03.90 CELLULITIS, UNSPECIFIED: ICD-10-CM

## 2018-11-16 ENCOUNTER — COMMUNICATION - HEALTHEAST (OUTPATIENT)
Dept: VASCULAR SURGERY | Facility: CLINIC | Age: 83
End: 2018-11-16

## 2018-11-20 ENCOUNTER — COMMUNICATION - HEALTHEAST (OUTPATIENT)
Dept: CARE COORDINATION | Facility: HOSPITAL | Age: 83
End: 2018-11-20

## 2018-11-29 ENCOUNTER — OFFICE VISIT - HEALTHEAST (OUTPATIENT)
Dept: VASCULAR SURGERY | Facility: CLINIC | Age: 83
End: 2018-11-29

## 2018-11-29 ENCOUNTER — COMMUNICATION - HEALTHEAST (OUTPATIENT)
Dept: VASCULAR SURGERY | Facility: CLINIC | Age: 83
End: 2018-11-29

## 2018-11-29 ENCOUNTER — RECORDS - HEALTHEAST (OUTPATIENT)
Dept: ADMINISTRATIVE | Facility: OTHER | Age: 83
End: 2018-11-29

## 2018-11-29 DIAGNOSIS — L03.115 CELLULITIS OF RIGHT LOWER EXTREMITY: ICD-10-CM

## 2018-11-29 DIAGNOSIS — E11.628 TYPE 2 DIABETES MELLITUS WITH OTHER SKIN COMPLICATION, UNSPECIFIED WHETHER LONG TERM INSULIN USE (H): ICD-10-CM

## 2018-11-29 DIAGNOSIS — R23.4 FISSURE IN SKIN OF FOOT: ICD-10-CM

## 2018-11-29 ASSESSMENT — MIFFLIN-ST. JEOR: SCORE: 1142.56

## 2018-12-07 ENCOUNTER — RECORDS - HEALTHEAST (OUTPATIENT)
Dept: LAB | Facility: CLINIC | Age: 83
End: 2018-12-07

## 2018-12-07 LAB
ALBUMIN UR-MCNC: ABNORMAL MG/DL
ANION GAP SERPL CALCULATED.3IONS-SCNC: 10 MMOL/L (ref 5–18)
APPEARANCE UR: ABNORMAL
BACTERIA #/AREA URNS HPF: ABNORMAL HPF
BILIRUB UR QL STRIP: NEGATIVE
BUN SERPL-MCNC: 31 MG/DL (ref 8–28)
C DIFF TOX B STL QL: NEGATIVE
CALCIUM SERPL-MCNC: 9.4 MG/DL (ref 8.5–10.5)
CHLORIDE BLD-SCNC: 110 MMOL/L (ref 98–107)
CO2 SERPL-SCNC: 26 MMOL/L (ref 22–31)
COLOR UR AUTO: YELLOW
CREAT SERPL-MCNC: 1.83 MG/DL (ref 0.6–1.1)
ERYTHROCYTE [DISTWIDTH] IN BLOOD BY AUTOMATED COUNT: 13.3 % (ref 11–14.5)
GFR SERPL CREATININE-BSD FRML MDRD: 26 ML/MIN/1.73M2
GLUCOSE BLD-MCNC: 146 MG/DL (ref 70–125)
GLUCOSE UR STRIP-MCNC: NEGATIVE MG/DL
HCT VFR BLD AUTO: 40.7 % (ref 35–47)
HGB BLD-MCNC: 12.3 G/DL (ref 12–16)
HGB UR QL STRIP: NEGATIVE
KETONES UR STRIP-MCNC: NEGATIVE MG/DL
LEUKOCYTE ESTERASE UR QL STRIP: ABNORMAL
MCH RBC QN AUTO: 29.5 PG (ref 27–34)
MCHC RBC AUTO-ENTMCNC: 30.2 G/DL (ref 32–36)
MCV RBC AUTO: 98 FL (ref 80–100)
MUCOUS THREADS #/AREA URNS LPF: ABNORMAL LPF
NITRATE UR QL: POSITIVE
PH UR STRIP: 5.5 [PH] (ref 4.5–8)
PLATELET # BLD AUTO: 237 THOU/UL (ref 140–440)
PMV BLD AUTO: 11.9 FL (ref 8.5–12.5)
POTASSIUM BLD-SCNC: 4 MMOL/L (ref 3.5–5)
RBC # BLD AUTO: 4.17 MILL/UL (ref 3.8–5.4)
RBC #/AREA URNS AUTO: ABNORMAL HPF
RIBOTYPE 027/NAP1/BI: NORMAL
SODIUM SERPL-SCNC: 146 MMOL/L (ref 136–145)
SP GR UR STRIP: 1.02 (ref 1–1.03)
SQUAMOUS #/AREA URNS AUTO: ABNORMAL LPF
UROBILINOGEN UR STRIP-ACNC: ABNORMAL
WBC #/AREA URNS AUTO: ABNORMAL HPF
WBC CLUMPS #/AREA URNS HPF: PRESENT /[HPF]
WBC: 10.5 THOU/UL (ref 4–11)

## 2018-12-09 LAB — BACTERIA SPEC CULT: ABNORMAL

## 2018-12-12 ENCOUNTER — OFFICE VISIT - HEALTHEAST (OUTPATIENT)
Dept: GERIATRICS | Facility: CLINIC | Age: 83
End: 2018-12-12

## 2018-12-12 DIAGNOSIS — E11.628 DIABETIC FOOT INFECTION (H): ICD-10-CM

## 2018-12-12 DIAGNOSIS — L08.9 DIABETIC FOOT INFECTION (H): ICD-10-CM

## 2018-12-12 DIAGNOSIS — B35.3 TINEA PEDIS OF LEFT FOOT: ICD-10-CM

## 2018-12-12 DIAGNOSIS — N30.00 ACUTE CYSTITIS WITHOUT HEMATURIA: ICD-10-CM

## 2018-12-13 ENCOUNTER — OFFICE VISIT - HEALTHEAST (OUTPATIENT)
Dept: VASCULAR SURGERY | Facility: CLINIC | Age: 83
End: 2018-12-13

## 2018-12-13 ENCOUNTER — COMMUNICATION - HEALTHEAST (OUTPATIENT)
Dept: VASCULAR SURGERY | Facility: CLINIC | Age: 83
End: 2018-12-13

## 2018-12-13 DIAGNOSIS — R23.4 FISSURE IN SKIN OF FOOT: ICD-10-CM

## 2018-12-13 DIAGNOSIS — E11.628 TYPE 2 DIABETES MELLITUS WITH OTHER SKIN COMPLICATION, UNSPECIFIED WHETHER LONG TERM INSULIN USE (H): ICD-10-CM

## 2018-12-13 DIAGNOSIS — L03.115 CELLULITIS OF RIGHT LOWER EXTREMITY: ICD-10-CM

## 2018-12-26 ENCOUNTER — OFFICE VISIT - HEALTHEAST (OUTPATIENT)
Dept: GERIATRICS | Facility: CLINIC | Age: 83
End: 2018-12-26

## 2018-12-26 DIAGNOSIS — F31.9 BIPOLAR AFFECTIVE DISORDER, REMISSION STATUS UNSPECIFIED (H): ICD-10-CM

## 2018-12-26 DIAGNOSIS — E11.8 TYPE 2 DIABETES MELLITUS WITH COMPLICATION, WITHOUT LONG-TERM CURRENT USE OF INSULIN (H): ICD-10-CM

## 2018-12-26 DIAGNOSIS — N18.4 CHRONIC KIDNEY DISEASE, STAGE IV (SEVERE) (H): ICD-10-CM

## 2018-12-26 DIAGNOSIS — R60.0 BILATERAL EDEMA OF LOWER EXTREMITY: ICD-10-CM

## 2019-01-03 ENCOUNTER — RECORDS - HEALTHEAST (OUTPATIENT)
Dept: LAB | Facility: CLINIC | Age: 84
End: 2019-01-03

## 2019-01-03 ENCOUNTER — OFFICE VISIT - HEALTHEAST (OUTPATIENT)
Dept: GERIATRICS | Facility: CLINIC | Age: 84
End: 2019-01-03

## 2019-01-03 DIAGNOSIS — R19.8 GASTROINTESTINAL SYMPTOMS: ICD-10-CM

## 2019-01-03 DIAGNOSIS — E11.628 DIABETIC FOOT INFECTION (H): ICD-10-CM

## 2019-01-03 DIAGNOSIS — L08.9 DIABETIC FOOT INFECTION (H): ICD-10-CM

## 2019-01-03 LAB
ANION GAP SERPL CALCULATED.3IONS-SCNC: 11 MMOL/L (ref 5–18)
BUN SERPL-MCNC: 27 MG/DL (ref 8–28)
CALCIUM SERPL-MCNC: 8.9 MG/DL (ref 8.5–10.5)
CHLORIDE BLD-SCNC: 106 MMOL/L (ref 98–107)
CO2 SERPL-SCNC: 27 MMOL/L (ref 22–31)
CREAT SERPL-MCNC: 1.49 MG/DL (ref 0.6–1.1)
ERYTHROCYTE [DISTWIDTH] IN BLOOD BY AUTOMATED COUNT: 13.6 % (ref 11–14.5)
GFR SERPL CREATININE-BSD FRML MDRD: 33 ML/MIN/1.73M2
GLUCOSE BLD-MCNC: 105 MG/DL (ref 70–125)
HCT VFR BLD AUTO: 39.4 % (ref 35–47)
HGB BLD-MCNC: 11.7 G/DL (ref 12–16)
MCH RBC QN AUTO: 29.2 PG (ref 27–34)
MCHC RBC AUTO-ENTMCNC: 29.7 G/DL (ref 32–36)
MCV RBC AUTO: 98 FL (ref 80–100)
PLATELET # BLD AUTO: 227 THOU/UL (ref 140–440)
PMV BLD AUTO: 11.8 FL (ref 8.5–12.5)
POTASSIUM BLD-SCNC: 4.3 MMOL/L (ref 3.5–5)
RBC # BLD AUTO: 4.01 MILL/UL (ref 3.8–5.4)
SODIUM SERPL-SCNC: 144 MMOL/L (ref 136–145)
WBC: 9.4 THOU/UL (ref 4–11)

## 2019-02-13 ENCOUNTER — RECORDS - HEALTHEAST (OUTPATIENT)
Dept: LAB | Facility: CLINIC | Age: 84
End: 2019-02-13

## 2019-02-13 ENCOUNTER — OFFICE VISIT - HEALTHEAST (OUTPATIENT)
Dept: GERIATRICS | Facility: CLINIC | Age: 84
End: 2019-02-13

## 2019-02-13 DIAGNOSIS — R41.0 CONFUSION: ICD-10-CM

## 2019-02-13 DIAGNOSIS — F31.9 BIPOLAR AFFECTIVE DISORDER, REMISSION STATUS UNSPECIFIED (H): ICD-10-CM

## 2019-02-13 LAB
ALBUMIN UR-MCNC: ABNORMAL MG/DL
AMORPH CRY #/AREA URNS HPF: ABNORMAL /[HPF]
APPEARANCE UR: ABNORMAL
BACTERIA #/AREA URNS HPF: ABNORMAL HPF
BILIRUB UR QL STRIP: NEGATIVE
COLOR UR AUTO: YELLOW
GLUCOSE UR STRIP-MCNC: NEGATIVE MG/DL
HGB UR QL STRIP: ABNORMAL
HYALINE CASTS #/AREA URNS LPF: ABNORMAL LPF
KETONES UR STRIP-MCNC: NEGATIVE MG/DL
LEUKOCYTE ESTERASE UR QL STRIP: ABNORMAL
MUCOUS THREADS #/AREA URNS LPF: ABNORMAL LPF
NITRATE UR QL: NEGATIVE
PH UR STRIP: 5.5 [PH] (ref 4.5–8)
RBC #/AREA URNS AUTO: ABNORMAL HPF
RENAL EPI CELLS #/AREA URNS HPF: ABNORMAL LPF
SP GR UR STRIP: 1.02 (ref 1–1.03)
SQUAMOUS #/AREA URNS AUTO: ABNORMAL LPF
UROBILINOGEN UR STRIP-ACNC: ABNORMAL
WBC #/AREA URNS AUTO: ABNORMAL HPF

## 2019-02-14 ENCOUNTER — RECORDS - HEALTHEAST (OUTPATIENT)
Dept: LAB | Facility: CLINIC | Age: 84
End: 2019-02-14

## 2019-02-14 LAB
ANION GAP SERPL CALCULATED.3IONS-SCNC: 10 MMOL/L (ref 5–18)
BACTERIA SPEC CULT: NO GROWTH
BUN SERPL-MCNC: 43 MG/DL (ref 8–28)
CALCIUM SERPL-MCNC: 9.3 MG/DL (ref 8.5–10.5)
CHLORIDE BLD-SCNC: 110 MMOL/L (ref 98–107)
CO2 SERPL-SCNC: 27 MMOL/L (ref 22–31)
CREAT SERPL-MCNC: 1.97 MG/DL (ref 0.6–1.1)
ERYTHROCYTE [DISTWIDTH] IN BLOOD BY AUTOMATED COUNT: 14.6 % (ref 11–14.5)
GFR SERPL CREATININE-BSD FRML MDRD: 24 ML/MIN/1.73M2
GLUCOSE BLD-MCNC: 70 MG/DL (ref 70–125)
HBA1C MFR BLD: 6.8 % (ref 4.2–6.1)
HCT VFR BLD AUTO: 36.7 % (ref 35–47)
HGB BLD-MCNC: 11.3 G/DL (ref 12–16)
MCH RBC QN AUTO: 29.4 PG (ref 27–34)
MCHC RBC AUTO-ENTMCNC: 30.8 G/DL (ref 32–36)
MCV RBC AUTO: 95 FL (ref 80–100)
PLATELET # BLD AUTO: 204 THOU/UL (ref 140–440)
PMV BLD AUTO: 12 FL (ref 8.5–12.5)
POTASSIUM BLD-SCNC: 4.3 MMOL/L (ref 3.5–5)
RBC # BLD AUTO: 3.85 MILL/UL (ref 3.8–5.4)
SODIUM SERPL-SCNC: 147 MMOL/L (ref 136–145)
WBC: 10.6 THOU/UL (ref 4–11)

## 2019-02-18 ENCOUNTER — RECORDS - HEALTHEAST (OUTPATIENT)
Dept: LAB | Facility: CLINIC | Age: 84
End: 2019-02-18

## 2019-02-18 ENCOUNTER — OFFICE VISIT - HEALTHEAST (OUTPATIENT)
Dept: GERIATRICS | Facility: CLINIC | Age: 84
End: 2019-02-18

## 2019-02-18 DIAGNOSIS — R31.9 URINARY TRACT INFECTION WITH HEMATURIA, SITE UNSPECIFIED: ICD-10-CM

## 2019-02-18 DIAGNOSIS — R41.0 CONFUSION: ICD-10-CM

## 2019-02-18 DIAGNOSIS — N39.0 URINARY TRACT INFECTION WITH HEMATURIA, SITE UNSPECIFIED: ICD-10-CM

## 2019-02-18 LAB
ANION GAP SERPL CALCULATED.3IONS-SCNC: 12 MMOL/L (ref 5–18)
BUN SERPL-MCNC: 33 MG/DL (ref 8–28)
CALCIUM SERPL-MCNC: 9.3 MG/DL (ref 8.5–10.5)
CHLORIDE BLD-SCNC: 107 MMOL/L (ref 98–107)
CO2 SERPL-SCNC: 26 MMOL/L (ref 22–31)
CREAT SERPL-MCNC: 1.99 MG/DL (ref 0.6–1.1)
GFR SERPL CREATININE-BSD FRML MDRD: 24 ML/MIN/1.73M2
GLUCOSE BLD-MCNC: 61 MG/DL (ref 70–125)
POTASSIUM BLD-SCNC: 3.9 MMOL/L (ref 3.5–5)
SODIUM SERPL-SCNC: 145 MMOL/L (ref 136–145)

## 2019-02-19 ENCOUNTER — RECORDS - HEALTHEAST (OUTPATIENT)
Dept: LAB | Facility: CLINIC | Age: 84
End: 2019-02-19

## 2019-02-19 LAB
ALBUMIN UR-MCNC: ABNORMAL MG/DL
AMORPH CRY #/AREA URNS HPF: ABNORMAL /[HPF]
APPEARANCE UR: ABNORMAL
BACTERIA #/AREA URNS HPF: ABNORMAL HPF
BILIRUB UR QL STRIP: NEGATIVE
COLOR UR AUTO: YELLOW
ERYTHROCYTE [DISTWIDTH] IN BLOOD BY AUTOMATED COUNT: 14.6 % (ref 11–14.5)
GLUCOSE UR STRIP-MCNC: NEGATIVE MG/DL
HCT VFR BLD AUTO: 34.6 % (ref 35–47)
HGB BLD-MCNC: 10.6 G/DL (ref 12–16)
HGB UR QL STRIP: ABNORMAL
HYALINE CASTS #/AREA URNS LPF: ABNORMAL LPF
KETONES UR STRIP-MCNC: ABNORMAL MG/DL
LEUKOCYTE ESTERASE UR QL STRIP: ABNORMAL
MCH RBC QN AUTO: 29.5 PG (ref 27–34)
MCHC RBC AUTO-ENTMCNC: 30.6 G/DL (ref 32–36)
MCV RBC AUTO: 96 FL (ref 80–100)
MUCOUS THREADS #/AREA URNS LPF: ABNORMAL LPF
NITRATE UR QL: NEGATIVE
PH UR STRIP: 5.5 [PH] (ref 4.5–8)
PLATELET # BLD AUTO: 193 THOU/UL (ref 140–440)
PMV BLD AUTO: 12.1 FL (ref 8.5–12.5)
RBC # BLD AUTO: 3.59 MILL/UL (ref 3.8–5.4)
RBC #/AREA URNS AUTO: ABNORMAL HPF
SP GR UR STRIP: 1.02 (ref 1–1.03)
SQUAMOUS #/AREA URNS AUTO: ABNORMAL LPF
UROBILINOGEN UR STRIP-ACNC: ABNORMAL
WBC #/AREA URNS AUTO: ABNORMAL HPF
WBC CLUMPS #/AREA URNS HPF: PRESENT /[HPF]
WBC: 7.9 THOU/UL (ref 4–11)

## 2019-02-20 LAB — BACTERIA SPEC CULT: NO GROWTH

## 2019-03-11 ENCOUNTER — OFFICE VISIT - HEALTHEAST (OUTPATIENT)
Dept: BEHAVIORAL HEALTH | Facility: HOSPITAL | Age: 84
End: 2019-03-11

## 2019-03-11 DIAGNOSIS — F31.11 BIPOLAR 1 DISORDER, MANIC, MILD (H): ICD-10-CM

## 2019-03-11 DIAGNOSIS — F31.9 BIPOLAR AFFECTIVE DISORDER (H): ICD-10-CM

## 2019-03-11 DIAGNOSIS — F30.4 BIPOLAR I DISORDER, SINGLE MANIC EPISODE, IN FULL REMISSION (H): ICD-10-CM

## 2019-04-09 ENCOUNTER — COMMUNICATION - HEALTHEAST (OUTPATIENT)
Dept: BEHAVIORAL HEALTH | Facility: CLINIC | Age: 84
End: 2019-04-09

## 2019-05-17 ENCOUNTER — OFFICE VISIT - HEALTHEAST (OUTPATIENT)
Dept: GERIATRICS | Facility: CLINIC | Age: 84
End: 2019-05-17

## 2019-05-17 DIAGNOSIS — E11.8 TYPE 2 DIABETES MELLITUS WITH COMPLICATION, WITHOUT LONG-TERM CURRENT USE OF INSULIN (H): ICD-10-CM

## 2019-05-17 DIAGNOSIS — R60.0 BILATERAL EDEMA OF LOWER EXTREMITY: ICD-10-CM

## 2019-05-17 DIAGNOSIS — R31.9 URINARY TRACT INFECTION WITH HEMATURIA, SITE UNSPECIFIED: ICD-10-CM

## 2019-05-17 DIAGNOSIS — N39.0 URINARY TRACT INFECTION WITH HEMATURIA, SITE UNSPECIFIED: ICD-10-CM

## 2019-05-17 DIAGNOSIS — F31.9 BIPOLAR AFFECTIVE DISORDER, REMISSION STATUS UNSPECIFIED (H): ICD-10-CM

## 2019-05-20 ENCOUNTER — RECORDS - HEALTHEAST (OUTPATIENT)
Dept: LAB | Facility: CLINIC | Age: 84
End: 2019-05-20

## 2019-05-20 LAB
ANION GAP SERPL CALCULATED.3IONS-SCNC: 9 MMOL/L (ref 5–18)
BUN SERPL-MCNC: 30 MG/DL (ref 8–28)
CALCIUM SERPL-MCNC: 8.4 MG/DL (ref 8.5–10.5)
CHLORIDE BLD-SCNC: 107 MMOL/L (ref 98–107)
CO2 SERPL-SCNC: 26 MMOL/L (ref 22–31)
CREAT SERPL-MCNC: 1.64 MG/DL (ref 0.6–1.1)
GFR SERPL CREATININE-BSD FRML MDRD: 30 ML/MIN/1.73M2
GLUCOSE BLD-MCNC: 65 MG/DL (ref 70–125)
POTASSIUM BLD-SCNC: 4.6 MMOL/L (ref 3.5–5)
SODIUM SERPL-SCNC: 142 MMOL/L (ref 136–145)
TSH SERPL DL<=0.005 MIU/L-ACNC: 1.76 UIU/ML (ref 0.3–5)

## 2019-05-21 LAB — HBA1C MFR BLD: 6.1 % (ref 4.2–6.1)

## 2019-07-15 ENCOUNTER — OFFICE VISIT - HEALTHEAST (OUTPATIENT)
Dept: GERIATRICS | Facility: CLINIC | Age: 84
End: 2019-07-15

## 2019-07-15 DIAGNOSIS — F31.9 BIPOLAR AFFECTIVE DISORDER, REMISSION STATUS UNSPECIFIED (H): ICD-10-CM

## 2019-07-15 DIAGNOSIS — N18.4 CHRONIC KIDNEY DISEASE, STAGE IV (SEVERE) (H): ICD-10-CM

## 2019-07-15 DIAGNOSIS — R60.0 BILATERAL EDEMA OF LOWER EXTREMITY: ICD-10-CM

## 2019-07-15 DIAGNOSIS — E11.8 TYPE 2 DIABETES MELLITUS WITH COMPLICATION, WITHOUT LONG-TERM CURRENT USE OF INSULIN (H): ICD-10-CM

## 2019-07-15 DIAGNOSIS — B35.3 TINEA PEDIS OF LEFT FOOT: ICD-10-CM

## 2019-10-03 ENCOUNTER — RECORDS - HEALTHEAST (OUTPATIENT)
Dept: LAB | Facility: CLINIC | Age: 84
End: 2019-10-03

## 2019-10-03 LAB
ANION GAP SERPL CALCULATED.3IONS-SCNC: 5 MMOL/L (ref 5–18)
BUN SERPL-MCNC: 34 MG/DL (ref 8–28)
CALCIUM SERPL-MCNC: 8.8 MG/DL (ref 8.5–10.5)
CHLORIDE BLD-SCNC: 109 MMOL/L (ref 98–107)
CO2 SERPL-SCNC: 30 MMOL/L (ref 22–31)
CREAT SERPL-MCNC: 1.89 MG/DL (ref 0.6–1.1)
GFR SERPL CREATININE-BSD FRML MDRD: 25 ML/MIN/1.73M2
GLUCOSE BLD-MCNC: 66 MG/DL (ref 70–125)
POTASSIUM BLD-SCNC: 4.7 MMOL/L (ref 3.5–5)
SODIUM SERPL-SCNC: 144 MMOL/L (ref 136–145)

## 2019-10-07 ENCOUNTER — OFFICE VISIT - HEALTHEAST (OUTPATIENT)
Dept: GERIATRICS | Facility: CLINIC | Age: 84
End: 2019-10-07

## 2019-10-07 DIAGNOSIS — N18.4 CHRONIC KIDNEY DISEASE, STAGE IV (SEVERE) (H): ICD-10-CM

## 2019-10-07 DIAGNOSIS — R60.0 BILATERAL EDEMA OF LOWER EXTREMITY: ICD-10-CM

## 2019-10-07 DIAGNOSIS — E11.8 TYPE 2 DIABETES MELLITUS WITH COMPLICATION, WITHOUT LONG-TERM CURRENT USE OF INSULIN (H): ICD-10-CM

## 2019-10-07 DIAGNOSIS — F31.9 BIPOLAR AFFECTIVE DISORDER, REMISSION STATUS UNSPECIFIED (H): ICD-10-CM

## 2019-10-15 ENCOUNTER — OFFICE VISIT - HEALTHEAST (OUTPATIENT)
Dept: GERIATRICS | Facility: CLINIC | Age: 84
End: 2019-10-15

## 2019-10-15 DIAGNOSIS — B35.3 TINEA PEDIS OF LEFT FOOT: ICD-10-CM

## 2019-10-16 ENCOUNTER — AMBULATORY - HEALTHEAST (OUTPATIENT)
Dept: ADMINISTRATIVE | Facility: CLINIC | Age: 84
End: 2019-10-16

## 2019-10-17 ENCOUNTER — COMMUNICATION - HEALTHEAST (OUTPATIENT)
Dept: CARE COORDINATION | Facility: HOSPITAL | Age: 84
End: 2019-10-17

## 2019-11-11 ENCOUNTER — OFFICE VISIT - HEALTHEAST (OUTPATIENT)
Dept: BEHAVIORAL HEALTH | Facility: HOSPITAL | Age: 84
End: 2019-11-11

## 2019-11-11 DIAGNOSIS — F30.4 BIPOLAR I DISORDER, SINGLE MANIC EPISODE, IN FULL REMISSION (H): ICD-10-CM

## 2019-11-11 DIAGNOSIS — F31.9 BIPOLAR AFFECTIVE DISORDER (H): ICD-10-CM

## 2019-11-11 DIAGNOSIS — F31.73 BIPOLAR AFFECTIVE DISORDER, MANIC, IN PARTIAL OR REMISSION: ICD-10-CM

## 2019-12-04 ENCOUNTER — OFFICE VISIT - HEALTHEAST (OUTPATIENT)
Dept: GERIATRICS | Facility: CLINIC | Age: 84
End: 2019-12-04

## 2019-12-04 DIAGNOSIS — R60.0 BILATERAL EDEMA OF LOWER EXTREMITY: ICD-10-CM

## 2019-12-04 DIAGNOSIS — B35.3 TINEA PEDIS OF LEFT FOOT: ICD-10-CM

## 2019-12-04 DIAGNOSIS — N18.4 CHRONIC KIDNEY DISEASE, STAGE IV (SEVERE) (H): ICD-10-CM

## 2019-12-04 DIAGNOSIS — E11.8 TYPE 2 DIABETES MELLITUS WITH COMPLICATION, WITHOUT LONG-TERM CURRENT USE OF INSULIN (H): ICD-10-CM

## 2019-12-04 DIAGNOSIS — F31.9 BIPOLAR AFFECTIVE DISORDER, REMISSION STATUS UNSPECIFIED (H): ICD-10-CM

## 2019-12-05 ENCOUNTER — RECORDS - HEALTHEAST (OUTPATIENT)
Dept: LAB | Facility: CLINIC | Age: 84
End: 2019-12-05

## 2019-12-05 LAB
ANION GAP SERPL CALCULATED.3IONS-SCNC: 8 MMOL/L (ref 5–18)
BUN SERPL-MCNC: 40 MG/DL (ref 8–28)
CALCIUM SERPL-MCNC: 8.9 MG/DL (ref 8.5–10.5)
CHLORIDE BLD-SCNC: 109 MMOL/L (ref 98–107)
CO2 SERPL-SCNC: 28 MMOL/L (ref 22–31)
CREAT SERPL-MCNC: 2.13 MG/DL (ref 0.6–1.1)
GFR SERPL CREATININE-BSD FRML MDRD: 22 ML/MIN/1.73M2
GLUCOSE BLD-MCNC: 102 MG/DL (ref 70–125)
POTASSIUM BLD-SCNC: 4.6 MMOL/L (ref 3.5–5)
SODIUM SERPL-SCNC: 145 MMOL/L (ref 136–145)

## 2019-12-09 ENCOUNTER — RECORDS - HEALTHEAST (OUTPATIENT)
Dept: LAB | Facility: CLINIC | Age: 84
End: 2019-12-09

## 2019-12-09 LAB
ANION GAP SERPL CALCULATED.3IONS-SCNC: 5 MMOL/L (ref 5–18)
BUN SERPL-MCNC: 35 MG/DL (ref 8–28)
CALCIUM SERPL-MCNC: 8.5 MG/DL (ref 8.5–10.5)
CHLORIDE BLD-SCNC: 110 MMOL/L (ref 98–107)
CO2 SERPL-SCNC: 29 MMOL/L (ref 22–31)
CREAT SERPL-MCNC: 1.89 MG/DL (ref 0.6–1.1)
GFR SERPL CREATININE-BSD FRML MDRD: 25 ML/MIN/1.73M2
GLUCOSE BLD-MCNC: 89 MG/DL (ref 70–125)
POTASSIUM BLD-SCNC: 4.7 MMOL/L (ref 3.5–5)
SODIUM SERPL-SCNC: 144 MMOL/L (ref 136–145)

## 2020-01-01 ENCOUNTER — AMBULATORY - HEALTHEAST (OUTPATIENT)
Dept: GERIATRICS | Facility: CLINIC | Age: 85
End: 2020-01-01

## 2020-01-01 ENCOUNTER — RECORDS - HEALTHEAST (OUTPATIENT)
Dept: LAB | Facility: CLINIC | Age: 85
End: 2020-01-01

## 2020-01-01 ENCOUNTER — COMMUNICATION - HEALTHEAST (OUTPATIENT)
Dept: GERIATRICS | Facility: CLINIC | Age: 85
End: 2020-01-01

## 2020-01-01 ENCOUNTER — OFFICE VISIT - HEALTHEAST (OUTPATIENT)
Dept: GERIATRICS | Facility: CLINIC | Age: 85
End: 2020-01-01

## 2020-01-01 ENCOUNTER — COMMUNICATION - HEALTHEAST (OUTPATIENT)
Dept: BEHAVIORAL HEALTH | Facility: CLINIC | Age: 85
End: 2020-01-01

## 2020-01-01 ENCOUNTER — COMMUNICATION - HEALTHEAST (OUTPATIENT)
Dept: CARE COORDINATION | Facility: HOSPITAL | Age: 85
End: 2020-01-01

## 2020-01-01 ENCOUNTER — HOME CARE/HOSPICE - HEALTHEAST (OUTPATIENT)
Dept: HOSPICE | Facility: HOSPICE | Age: 85
End: 2020-01-01

## 2020-01-01 DIAGNOSIS — N18.4 CHRONIC KIDNEY DISEASE, STAGE IV (SEVERE) (H): ICD-10-CM

## 2020-01-01 DIAGNOSIS — F31.9 BIPOLAR AFFECTIVE DISORDER, REMISSION STATUS UNSPECIFIED (H): ICD-10-CM

## 2020-01-01 DIAGNOSIS — R60.0 BILATERAL EDEMA OF LOWER EXTREMITY: ICD-10-CM

## 2020-01-01 DIAGNOSIS — E11.8 TYPE 2 DIABETES MELLITUS WITH COMPLICATION, WITHOUT LONG-TERM CURRENT USE OF INSULIN (H): ICD-10-CM

## 2020-01-01 DIAGNOSIS — R19.5 LOOSE STOOLS: ICD-10-CM

## 2020-01-01 DIAGNOSIS — N18.9 ACUTE RENAL FAILURE SUPERIMPOSED ON CHRONIC KIDNEY DISEASE, UNSPECIFIED CKD STAGE, UNSPECIFIED ACUTE RENAL FAILURE TYPE: ICD-10-CM

## 2020-01-01 DIAGNOSIS — R41.0 CONFUSION: ICD-10-CM

## 2020-01-01 DIAGNOSIS — N17.9 ACUTE RENAL FAILURE SUPERIMPOSED ON CHRONIC KIDNEY DISEASE, UNSPECIFIED CKD STAGE, UNSPECIFIED ACUTE RENAL FAILURE TYPE: ICD-10-CM

## 2020-01-01 DIAGNOSIS — N39.0 RECURRENT UTI: ICD-10-CM

## 2020-01-01 DIAGNOSIS — R11.0 NAUSEA: ICD-10-CM

## 2020-01-01 DIAGNOSIS — Z51.5 HOSPICE CARE PATIENT: ICD-10-CM

## 2020-01-01 DIAGNOSIS — U07.1 2019 NOVEL CORONAVIRUS DISEASE (COVID-19): ICD-10-CM
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
## 2020-01-01 DIAGNOSIS — E66.01 MORBID OBESITY (H): ICD-10-CM

## 2020-01-01 DIAGNOSIS — R06.2 WHEEZING: ICD-10-CM

## 2020-01-01 LAB
ALBUMIN UR-MCNC: ABNORMAL MG/DL
ANION GAP SERPL CALCULATED.3IONS-SCNC: 10 MMOL/L (ref 5–18)
ANION GAP SERPL CALCULATED.3IONS-SCNC: 10 MMOL/L (ref 5–18)
ANION GAP SERPL CALCULATED.3IONS-SCNC: 11 MMOL/L (ref 5–18)
ANION GAP SERPL CALCULATED.3IONS-SCNC: 12 MMOL/L (ref 5–18)
ANION GAP SERPL CALCULATED.3IONS-SCNC: 12 MMOL/L (ref 5–18)
ANION GAP SERPL CALCULATED.3IONS-SCNC: 14 MMOL/L (ref 5–18)
ANION GAP SERPL CALCULATED.3IONS-SCNC: 7 MMOL/L (ref 5–18)
ANION GAP SERPL CALCULATED.3IONS-SCNC: 8 MMOL/L (ref 5–18)
ANION GAP SERPL CALCULATED.3IONS-SCNC: 9 MMOL/L (ref 5–18)
APPEARANCE UR: ABNORMAL
BACTERIA #/AREA URNS HPF: ABNORMAL HPF
BACTERIA SPEC CULT: ABNORMAL
BILIRUB UR QL STRIP: NEGATIVE
BNP SERPL-MCNC: 51 PG/ML (ref 0–167)
BNP SERPL-MCNC: 80 PG/ML (ref 0–167)
BUN SERPL-MCNC: 18 MG/DL (ref 8–28)
BUN SERPL-MCNC: 22 MG/DL (ref 8–28)
BUN SERPL-MCNC: 23 MG/DL (ref 8–28)
BUN SERPL-MCNC: 29 MG/DL (ref 8–28)
BUN SERPL-MCNC: 34 MG/DL (ref 8–28)
BUN SERPL-MCNC: 35 MG/DL (ref 8–28)
BUN SERPL-MCNC: 35 MG/DL (ref 8–28)
BUN SERPL-MCNC: 36 MG/DL (ref 8–28)
BUN SERPL-MCNC: 37 MG/DL (ref 8–28)
BUN SERPL-MCNC: 43 MG/DL (ref 8–28)
BUN SERPL-MCNC: 45 MG/DL (ref 8–28)
C REACTIVE PROTEIN LHE: 0.8 MG/DL (ref 0–0.8)
CALCIUM SERPL-MCNC: 7.5 MG/DL (ref 8.5–10.5)
CALCIUM SERPL-MCNC: 7.8 MG/DL (ref 8.5–10.5)
CALCIUM SERPL-MCNC: 7.9 MG/DL (ref 8.5–10.5)
CALCIUM SERPL-MCNC: 8 MG/DL (ref 8.5–10.5)
CALCIUM SERPL-MCNC: 8 MG/DL (ref 8.5–10.5)
CALCIUM SERPL-MCNC: 8.1 MG/DL (ref 8.5–10.5)
CALCIUM SERPL-MCNC: 8.1 MG/DL (ref 8.5–10.5)
CALCIUM SERPL-MCNC: 8.3 MG/DL (ref 8.5–10.5)
CALCIUM SERPL-MCNC: 8.5 MG/DL (ref 8.5–10.5)
CALCIUM SERPL-MCNC: 8.6 MG/DL (ref 8.5–10.5)
CALCIUM SERPL-MCNC: 8.6 MG/DL (ref 8.5–10.5)
CHLORIDE BLD-SCNC: 104 MMOL/L (ref 98–107)
CHLORIDE BLD-SCNC: 104 MMOL/L (ref 98–107)
CHLORIDE BLD-SCNC: 105 MMOL/L (ref 98–107)
CHLORIDE BLD-SCNC: 106 MMOL/L (ref 98–107)
CHLORIDE BLD-SCNC: 107 MMOL/L (ref 98–107)
CHLORIDE BLD-SCNC: 111 MMOL/L (ref 98–107)
CO2 SERPL-SCNC: 22 MMOL/L (ref 22–31)
CO2 SERPL-SCNC: 23 MMOL/L (ref 22–31)
CO2 SERPL-SCNC: 24 MMOL/L (ref 22–31)
CO2 SERPL-SCNC: 25 MMOL/L (ref 22–31)
CO2 SERPL-SCNC: 26 MMOL/L (ref 22–31)
CO2 SERPL-SCNC: 26 MMOL/L (ref 22–31)
CO2 SERPL-SCNC: 27 MMOL/L (ref 22–31)
CO2 SERPL-SCNC: 27 MMOL/L (ref 22–31)
CO2 SERPL-SCNC: 29 MMOL/L (ref 22–31)
COLOR UR AUTO: YELLOW
CREAT SERPL-MCNC: 1.87 MG/DL (ref 0.6–1.1)
CREAT SERPL-MCNC: 1.98 MG/DL (ref 0.6–1.1)
CREAT SERPL-MCNC: 2.03 MG/DL (ref 0.6–1.1)
CREAT SERPL-MCNC: 2.15 MG/DL (ref 0.6–1.1)
CREAT SERPL-MCNC: 2.19 MG/DL (ref 0.6–1.1)
CREAT SERPL-MCNC: 2.24 MG/DL (ref 0.6–1.1)
CREAT SERPL-MCNC: 2.3 MG/DL (ref 0.6–1.1)
CREAT SERPL-MCNC: 2.33 MG/DL (ref 0.6–1.1)
CREAT SERPL-MCNC: 2.38 MG/DL (ref 0.6–1.1)
CREAT SERPL-MCNC: 2.38 MG/DL (ref 0.6–1.1)
CREAT SERPL-MCNC: 2.58 MG/DL (ref 0.6–1.1)
ERYTHROCYTE [DISTWIDTH] IN BLOOD BY AUTOMATED COUNT: 13.1 % (ref 11–14.5)
ERYTHROCYTE [DISTWIDTH] IN BLOOD BY AUTOMATED COUNT: 13.1 % (ref 11–14.5)
ERYTHROCYTE [DISTWIDTH] IN BLOOD BY AUTOMATED COUNT: 13.5 % (ref 11–14.5)
ERYTHROCYTE [DISTWIDTH] IN BLOOD BY AUTOMATED COUNT: 14.4 % (ref 11–14.5)
ERYTHROCYTE [DISTWIDTH] IN BLOOD BY AUTOMATED COUNT: 14.6 % (ref 11–14.5)
ERYTHROCYTE [DISTWIDTH] IN BLOOD BY AUTOMATED COUNT: 14.7 % (ref 11–14.5)
GFR SERPL CREATININE-BSD FRML MDRD: 17 ML/MIN/1.73M2
GFR SERPL CREATININE-BSD FRML MDRD: 19 ML/MIN/1.73M2
GFR SERPL CREATININE-BSD FRML MDRD: 19 ML/MIN/1.73M2
GFR SERPL CREATININE-BSD FRML MDRD: 20 ML/MIN/1.73M2
GFR SERPL CREATININE-BSD FRML MDRD: 20 ML/MIN/1.73M2
GFR SERPL CREATININE-BSD FRML MDRD: 21 ML/MIN/1.73M2
GFR SERPL CREATININE-BSD FRML MDRD: 21 ML/MIN/1.73M2
GFR SERPL CREATININE-BSD FRML MDRD: 22 ML/MIN/1.73M2
GFR SERPL CREATININE-BSD FRML MDRD: 23 ML/MIN/1.73M2
GFR SERPL CREATININE-BSD FRML MDRD: 24 ML/MIN/1.73M2
GFR SERPL CREATININE-BSD FRML MDRD: 25 ML/MIN/1.73M2
GLUCOSE BLD-MCNC: 100 MG/DL (ref 70–125)
GLUCOSE BLD-MCNC: 107 MG/DL (ref 70–125)
GLUCOSE BLD-MCNC: 130 MG/DL (ref 70–125)
GLUCOSE BLD-MCNC: 132 MG/DL (ref 70–125)
GLUCOSE BLD-MCNC: 170 MG/DL (ref 70–125)
GLUCOSE BLD-MCNC: 49 MG/DL (ref 70–125)
GLUCOSE BLD-MCNC: 53 MG/DL (ref 70–125)
GLUCOSE BLD-MCNC: 61 MG/DL (ref 70–125)
GLUCOSE BLD-MCNC: 89 MG/DL (ref 70–125)
GLUCOSE BLD-MCNC: 90 MG/DL (ref 70–125)
GLUCOSE BLD-MCNC: 91 MG/DL (ref 70–125)
GLUCOSE UR STRIP-MCNC: NEGATIVE MG/DL
HCT VFR BLD AUTO: 34.8 % (ref 35–47)
HCT VFR BLD AUTO: 36.5 % (ref 35–47)
HCT VFR BLD AUTO: 37.1 % (ref 35–47)
HCT VFR BLD AUTO: 38 % (ref 35–47)
HCT VFR BLD AUTO: 38.7 % (ref 35–47)
HCT VFR BLD AUTO: 39.4 % (ref 35–47)
HGB BLD-MCNC: 10.8 G/DL (ref 12–16)
HGB BLD-MCNC: 11.1 G/DL (ref 12–16)
HGB BLD-MCNC: 11.2 G/DL (ref 12–16)
HGB BLD-MCNC: 11.5 G/DL (ref 12–16)
HGB BLD-MCNC: 11.8 G/DL (ref 12–16)
HGB BLD-MCNC: 12 G/DL (ref 12–16)
HGB UR QL STRIP: ABNORMAL
HYALINE CASTS #/AREA URNS LPF: ABNORMAL LPF
KETONES UR STRIP-MCNC: NEGATIVE MG/DL
LEUKOCYTE ESTERASE UR QL STRIP: ABNORMAL
MCH RBC QN AUTO: 29 PG (ref 27–34)
MCH RBC QN AUTO: 29.4 PG (ref 27–34)
MCH RBC QN AUTO: 29.4 PG (ref 27–34)
MCH RBC QN AUTO: 30.5 PG (ref 27–34)
MCH RBC QN AUTO: 30.8 PG (ref 27–34)
MCH RBC QN AUTO: 30.9 PG (ref 27–34)
MCHC RBC AUTO-ENTMCNC: 29.7 G/DL (ref 32–36)
MCHC RBC AUTO-ENTMCNC: 30.2 G/DL (ref 32–36)
MCHC RBC AUTO-ENTMCNC: 30.4 G/DL (ref 32–36)
MCHC RBC AUTO-ENTMCNC: 30.5 G/DL (ref 32–36)
MCHC RBC AUTO-ENTMCNC: 31 G/DL (ref 32–36)
MCHC RBC AUTO-ENTMCNC: 31.1 G/DL (ref 32–36)
MCV RBC AUTO: 100 FL (ref 80–100)
MCV RBC AUTO: 101 FL (ref 80–100)
MCV RBC AUTO: 103 FL (ref 80–100)
MCV RBC AUTO: 95 FL (ref 80–100)
MCV RBC AUTO: 95 FL (ref 80–100)
MCV RBC AUTO: 98 FL (ref 80–100)
MUCOUS THREADS #/AREA URNS LPF: ABNORMAL LPF
MUCOUS THREADS #/AREA URNS LPF: ABNORMAL LPF
NITRATE UR QL: NEGATIVE
NITRATE UR QL: NEGATIVE
NITRATE UR QL: POSITIVE
PH UR STRIP: 5.5 [PH] (ref 4.5–8)
PLATELET # BLD AUTO: 134 THOU/UL (ref 140–440)
PLATELET # BLD AUTO: 143 THOU/UL (ref 140–440)
PLATELET # BLD AUTO: 177 THOU/UL (ref 140–440)
PLATELET # BLD AUTO: 199 THOU/UL (ref 140–440)
PLATELET # BLD AUTO: 206 THOU/UL (ref 140–440)
PLATELET # BLD AUTO: 214 THOU/UL (ref 140–440)
PMV BLD AUTO: 11.6 FL (ref 8.5–12.5)
PMV BLD AUTO: 11.9 FL (ref 8.5–12.5)
PMV BLD AUTO: 12 FL (ref 8.5–12.5)
PMV BLD AUTO: 12.1 FL (ref 8.5–12.5)
PMV BLD AUTO: 12.5 FL (ref 8.5–12.5)
PMV BLD AUTO: 12.7 FL (ref 8.5–12.5)
POTASSIUM BLD-SCNC: 4 MMOL/L (ref 3.5–5)
POTASSIUM BLD-SCNC: 4.2 MMOL/L (ref 3.5–5)
POTASSIUM BLD-SCNC: 4.4 MMOL/L (ref 3.5–5)
POTASSIUM BLD-SCNC: 4.5 MMOL/L (ref 3.5–5)
POTASSIUM BLD-SCNC: 4.5 MMOL/L (ref 3.5–5)
POTASSIUM BLD-SCNC: 4.6 MMOL/L (ref 3.5–5)
POTASSIUM BLD-SCNC: 4.6 MMOL/L (ref 3.5–5)
PROCALCITONIN SERPL-MCNC: 0.1 NG/ML (ref 0–0.49)
PROCALCITONIN SERPL-MCNC: 0.11 NG/ML (ref 0–0.49)
RBC # BLD AUTO: 3.62 MILL/UL (ref 3.8–5.4)
RBC # BLD AUTO: 3.64 MILL/UL (ref 3.8–5.4)
RBC # BLD AUTO: 3.67 MILL/UL (ref 3.8–5.4)
RBC # BLD AUTO: 3.89 MILL/UL (ref 3.8–5.4)
RBC # BLD AUTO: 3.96 MILL/UL (ref 3.8–5.4)
RBC # BLD AUTO: 4.02 MILL/UL (ref 3.8–5.4)
RBC #/AREA URNS AUTO: ABNORMAL HPF
SODIUM SERPL-SCNC: 139 MMOL/L (ref 136–145)
SODIUM SERPL-SCNC: 140 MMOL/L (ref 136–145)
SODIUM SERPL-SCNC: 141 MMOL/L (ref 136–145)
SODIUM SERPL-SCNC: 142 MMOL/L (ref 136–145)
SODIUM SERPL-SCNC: 145 MMOL/L (ref 136–145)
SP GR UR STRIP: 1.01 (ref 1–1.03)
SP GR UR STRIP: 1.02 (ref 1–1.03)
SP GR UR STRIP: 1.02 (ref 1–1.03)
SQUAMOUS #/AREA URNS AUTO: ABNORMAL LPF
URATE CRY #/AREA URNS HPF: PRESENT /[HPF]
UROBILINOGEN UR STRIP-ACNC: ABNORMAL
WBC #/AREA URNS AUTO: >100 HPF
WBC #/AREA URNS AUTO: ABNORMAL HPF
WBC #/AREA URNS AUTO: ABNORMAL HPF
WBC CLUMPS #/AREA URNS HPF: PRESENT /[HPF]
WBC CLUMPS #/AREA URNS HPF: PRESENT /[HPF]
WBC: 10.8 THOU/UL (ref 4–11)
WBC: 11.6 THOU/UL (ref 4–11)
WBC: 5.1 THOU/UL (ref 4–11)
WBC: 5.4 THOU/UL (ref 4–11)
WBC: 6 THOU/UL (ref 4–11)
WBC: 9.1 THOU/UL (ref 4–11)

## 2020-01-01 RX ORDER — HYDROMORPHONE HYDROCHLORIDE 1 MG/ML
SOLUTION ORAL
Qty: 15 ML | Refills: 0 | Status: SHIPPED | OUTPATIENT
Start: 2020-01-01

## 2021-01-04 ENCOUNTER — AMBULATORY - HEALTHEAST (OUTPATIENT)
Dept: GERIATRICS | Facility: CLINIC | Age: 86
End: 2021-01-04

## 2021-05-26 VITALS
DIASTOLIC BLOOD PRESSURE: 80 MMHG | SYSTOLIC BLOOD PRESSURE: 116 MMHG | RESPIRATION RATE: 20 BRPM | OXYGEN SATURATION: 95 % | HEART RATE: 111 BPM | TEMPERATURE: 98.3 F

## 2021-05-27 VITALS
HEART RATE: 56 BPM | RESPIRATION RATE: 16 BRPM | DIASTOLIC BLOOD PRESSURE: 81 MMHG | DIASTOLIC BLOOD PRESSURE: 55 MMHG | RESPIRATION RATE: 20 BRPM | TEMPERATURE: 97.5 F | OXYGEN SATURATION: 97 % | SYSTOLIC BLOOD PRESSURE: 85 MMHG | TEMPERATURE: 97.8 F | HEART RATE: 69 BPM | OXYGEN SATURATION: 90 % | SYSTOLIC BLOOD PRESSURE: 160 MMHG

## 2021-05-27 VITALS
TEMPERATURE: 98.2 F | HEART RATE: 99 BPM | OXYGEN SATURATION: 97 % | RESPIRATION RATE: 14 BRPM | SYSTOLIC BLOOD PRESSURE: 117 MMHG | DIASTOLIC BLOOD PRESSURE: 74 MMHG

## 2021-05-27 VITALS
TEMPERATURE: 98.1 F | SYSTOLIC BLOOD PRESSURE: 154 MMHG | HEART RATE: 89 BPM | DIASTOLIC BLOOD PRESSURE: 88 MMHG | OXYGEN SATURATION: 96 % | RESPIRATION RATE: 20 BRPM

## 2021-05-27 NOTE — TELEPHONE ENCOUNTER
Incoming fax received from [facility] asking for provider review and fax back to [phone], stickered and brought to provider support staff in clinic core

## 2021-05-28 NOTE — TELEPHONE ENCOUNTER
Form faxed back to [phone] after reviewed and signed by Dr. Barrios. Form sent to medical records.

## 2021-05-30 VITALS — WEIGHT: 196 LBS | BODY MASS INDEX: 39.59 KG/M2

## 2021-05-30 VITALS — HEIGHT: 59 IN | BODY MASS INDEX: 39.59 KG/M2

## 2021-05-30 NOTE — PROGRESS NOTES
Code Status:  DNR/DNI  Visit Type: Review Of Multiple Medical Conditions     Facility:  [facility] [[id number]]         Facility Type: [facility] (Long Term Care, LTC)    History of Present Illness: Roseanna Mcmahan is a 88 y.o. female who I am seeing today for review of chronic medical conditions.   Pt with hx of diabetes type II, hypertension, bipolar depression, anemia of chronic disease, chronic kidney disease and LE edema. DJD of the knees. Pt continues to ambulate with rolling walker. She is returning from music in the [employer] today. Pt with redness under her breast, receiving nystatin powder. Pt with hx of fungal infection to her feet. Rash now resolved. She continues in moisture wicking socks. Pt recently seen by in house eye doctor. Pt started on Latanoprost eye drops at . Today pt denies any pain.       Active Ambulatory Problems     Diagnosis Date Noted     Cerumen Impaction      (Lower) Leg Localized Swelling Bilateral      Bipolar Disorder      Cellulitis      Type 2 Diabetes Mellitus      Chronic Kidney Disease With Benign Hypertension      Osteoarthritis Of The Knee      Chronic Kidney Disease, Stage 4      Bipolar I disorder, most recent episode (or current) depressed, mild 08/11/2014     Adjustment disorder with mixed anxiety and depressed mood 08/11/2014     Bipolar I disorder, most recent episode (or current) depressed, mild 10/13/2014     Bipolar I disorder, most recent episode (or current) depressed, in partial or unspecified remission 04/13/2015     Intertrigo 12/14/2015     Resolved Ambulatory Problems     Diagnosis Date Noted     No Resolved Ambulatory Problems     Past Medical History:   Diagnosis Date     (Lower) Leg Localized Swelling Bilateral      Adjustment disorder with mixed anxiety and depressed mood 8/11/2014     Bipolar Disorder      Bipolar I disorder, most recent episode (or current) depressed, in partial or unspecified remission 4/13/2015     Bipolar I disorder, most  recent episode (or current) depressed, mild 8/11/2014     Cellulitis      Cerumen Impaction      Chronic Kidney Disease With Benign Hypertension      Chronic Kidney Disease, Stage 4      Intertrigo 12/14/2015     Osteoarthritis Of The Knee      Type 2 Diabetes Mellitus      Meds reviewed at facility.       No Known Allergies    Review of Systems   No fever or chills. No headache, lightheadedness or dizziness. No SOB, chest pains or palpitations. No nausea, vomiting, constipation or diarrhea.  Patient denies any dysuria, frequency, burning or pain with urination. Foot rash healed.  Otherwise review of systems are negative.         PHYSICAL EXAMINATION:  General: Patient seen ambulating in the hallway.  Pleasant.    VS: /83, P 70, R 20, T 99.0, O2 sat 95% RA.  Weight 186 pounds.  HEENT:moist oral mucosa.  Severe hard of hearing.  No aids.  She does wear dentures.  NECK: Supple  CARDIOVASCULAR: S1, S2 without murmur or gallop.   RESPIRATORY: No wheezes, rales or rhonchi.   ABDOMEN: Obese.  Soft, non tender with positive bowel sounds.   EXTREMITIES: Good range of motion on both upper and lower extremities, trace pedal edema, no calf tenderness.  Ambulating with a rolling walker.  NEUROLOGIC: Intact, pulses weak.   PSYCHIATRIC: Mild cognitive impairment.  She does remember who I am. She calls me by name.     Assessment and Plan:  1. Bipolar affective disorder, remission status unspecified (H)     2. Bilateral edema of lower extremity     3. Type 2 diabetes mellitus with complication, without long-term current use of insulin (H)     4. Chronic Kidney Disease, Stage 4     5. Tinea pedis of left foot       Patient with bipolar disorder. Mood stable on Abilify and Wellbutrin. DM type II satisfactory controlled on Levemir. Recent HgbA1c 5.1. CKD stage 4. Creatine 1.64. Down from 1.95. Tinea pedis now resolved. She continues in moisture wicking socks. Edema to BLE well controlled with low dose lasix. Recent addition of  latanoprost by eye MD.          Electronically signed by: Kim Reynolds, JUANI

## 2021-05-31 VITALS — HEIGHT: 59 IN | BODY MASS INDEX: 39.59 KG/M2

## 2021-05-31 VITALS — WEIGHT: 198.7 LBS | BODY MASS INDEX: 40.13 KG/M2

## 2021-05-31 VITALS — BODY MASS INDEX: 40.6 KG/M2 | WEIGHT: 201 LBS

## 2021-06-01 VITALS — BODY MASS INDEX: 39.51 KG/M2 | WEIGHT: 195.6 LBS

## 2021-06-01 VITALS — BODY MASS INDEX: 40.37 KG/M2 | WEIGHT: 199.9 LBS

## 2021-06-01 VITALS — BODY MASS INDEX: 40.37 KG/M2 | HEIGHT: 59 IN

## 2021-06-01 VITALS — HEIGHT: 59 IN | BODY MASS INDEX: 40.37 KG/M2

## 2021-06-02 ENCOUNTER — RECORDS - HEALTHEAST (OUTPATIENT)
Dept: ADMINISTRATIVE | Facility: CLINIC | Age: 86
End: 2021-06-02

## 2021-06-02 VITALS — BODY MASS INDEX: 36.36 KG/M2 | HEIGHT: 59 IN

## 2021-06-02 VITALS — BODY MASS INDEX: 36.36 KG/M2 | WEIGHT: 180 LBS

## 2021-06-02 VITALS — WEIGHT: 180.1 LBS | HEIGHT: 59 IN | BODY MASS INDEX: 36.31 KG/M2

## 2021-06-02 NOTE — PROGRESS NOTES
Code Status:  DNR/DNI  Visit Type: Problem Visit     Facility:  [facility] [[id number]]         Facility Type:  (Long Term Care, LTC)    History of Present Illness: Roseanna Mcmahan is a 88 y.o. female who I am seeing today at the request the nursing staff.  Pt with hx of diabetes type II, hypertension, bipolar depression, anemia of chronic disease, chronic kidney disease and LE edema. DJD of the knees.  Today nursing staff reporting redness between the fourth and fifth digit on the right foot.  She has had a history of breakdown on the splint in the past and was seen by vascular center.  Today she is sitting up in a bedside chair.  She denies any pain.  She reports she was recently seen by podiatry and her skin was nicked. Area with a large scab over the 5th digit which was loose. The dead skin was removed. Slightly pink underneath. Skin moist between toes 4 and 5 on right foot. No signs of infection.         Active Ambulatory Problems     Diagnosis Date Noted     Cerumen Impaction      (Lower) Leg Localized Swelling Bilateral      Bipolar Disorder      Cellulitis      Type 2 Diabetes Mellitus      Chronic Kidney Disease With Benign Hypertension      Osteoarthritis Of The Knee      Chronic Kidney Disease, Stage 4      Bipolar I disorder, most recent episode (or current) depressed, mild 08/11/2014     Adjustment disorder with mixed anxiety and depressed mood 08/11/2014     Bipolar I disorder, most recent episode (or current) depressed, mild 10/13/2014     Bipolar I disorder, most recent episode (or current) depressed, in partial or unspecified remission 04/13/2015     Intertrigo 12/14/2015     Resolved Ambulatory Problems     Diagnosis Date Noted     No Resolved Ambulatory Problems     No Additional Past Medical History     Meds reviewed at facility.       No Known Allergies    Review of Systems   No fever or chills. No headache, lightheadedness or dizziness. No SOB, chest pains or palpitations. No  nausea, vomiting, constipation or diarrhea.  Patient denies any dysuria, frequency, burning or pain with urination. She jackie 115/80, pulsees any pain in her foot. Otherwise review of systems are negative.         PHYSICAL EXAMINATION:  General: Patient seen ambulating in the hallway.  Pleasant.    VS: , respirations 20, temperature 98.3, O2 sat 95% RA.  Weight 184.4 pounds.  HEENT:moist oral mucosa.  Severe hard of hearing.  No aids.  She does wear dentures.  NECK: Supple  ABDOMEN: Obese.   EXTREMITIES: Good range of motion on both upper and lower extremities, no pedal edema, no calf tenderness.  Ambulating with a rolling walker.  SKIN: Right foot 5th digit with large scab loosened. Scab removed. Slightly pink. Moist between 4th and 5th toes.   NEUROLOGIC: Intact, pulses weak.   PSYCHIATRIC: Mild cognitive impairment.  She does remember who I am. She calls me by name.     Assessment and Plan:  1. Tinea pedis of left foot  Moisture between toes. Recently seen by podiatry and nicked on 5th toe. Scab removed. Slightly pink. Will resume TMC 0.1% two times a day X 5 days or until resolved.             Electronically signed by: Kim Reynolds, CNP

## 2021-06-02 NOTE — PROGRESS NOTES
[facility]    Facility[facility] [[id number]]   Code Status: DNR/DNI      CHIEF COMPLAINT/REASON FOR VISIT:  Chief Complaint   Patient presents with     Review Of Multiple Medical Conditions     View of multiple medical problems are listed below.       HISTORY:      HPI: Roseanna is a 88 y.o. female who resides here at the [facility] for multiple medical branches of chronic kidney disease stage III and I did review that has been too much changes in her kidney function.  She is tachycardic today 111 but then I did auscultate her and she was about 90 bpm.  Her heart sounds were regular.  She does have bipolar disorder but nurses indicate to me that her behaviors have been pretty good at this time.  Did have redness under her breast and she is receiving nystatin powder and this seems to be helping.  She does also have hypertension which her blood pressures in adequate control at this time.        Past Medical History:   Diagnosis Date     (Lower) Leg Localized Swelling Bilateral     Created by Conversion      Adjustment disorder with mixed anxiety and depressed mood 8/11/2014     Bipolar Disorder     Created by Conversion  Replacement Utility updated for latest IMO load     Bipolar I disorder, most recent episode (or current) depressed, in partial or unspecified remission 4/13/2015     Bipolar I disorder, most recent episode (or current) depressed, mild 8/11/2014     Replacement Utility updated for latest IMO load     Cellulitis     Created by Conversion      Cerumen Impaction     Created by Conversion      Chronic Kidney Disease With Benign Hypertension     Created by Conversion      Chronic Kidney Disease, Stage 4     Created by Conversion      Intertrigo 12/14/2015     Osteoarthritis Of The Knee     Created by Conversion  Replacement Utility updated for latest IMO load     Type 2 Diabetes Mellitus     Created by Conversion              Family History    Problem Relation Age of Onset     Diabetes Sister      Cancer Sister      Diabetes Brother      Cancer Brother      Diabetes Son      Diabetes Sister      Cancer Son         colon cancer     Social History     Socioeconomic History     Marital status:      Spouse name: None     Number of children: None     Years of education: None     Highest education level: None   Occupational History     None   Social Needs     Financial resource strain: None     Food insecurity:     Worry: None     Inability: None     Transportation needs:     Medical: None     Non-medical: None   Tobacco Use     Smoking status: Former Smoker     Types: Cigarettes     Start date: 1949     Last attempt to quit: 1990     Years since quittin.3     Smokeless tobacco: Never Used   Substance and Sexual Activity     Alcohol use: No     Frequency: Never     Comment: rare     Drug use: No     Sexual activity: Never   Lifestyle     Physical activity:     Days per week: None     Minutes per session: None     Stress: None   Relationships     Social connections:     Talks on phone: None     Gets together: None     Attends [employer] service: None     Active member of club or organization: None     Attends meetings of clubs or organizations: None     Relationship status: None     Intimate partner violence:     Fear of current or ex partner: None     Emotionally abused: None     Physically abused: None     Forced sexual activity: None   Other Topics Concern     None   Social History Narrative     None         Review of Systems   Constitutional:        Patient denies any pain fevers chills nausea vomiting diarrhea change in vision hearing taste smell weakness one-sided other chest pain shortness of breath she denies any congeners stool polyphagia polydipsia polyuria depression or anxiety in the main review of systems negative.  Staff indicates her behaviors have been adequate and appropriate at this time.       Vitals:    10/07/19 1431    BP: 116/80   Pulse: (!) 111   Resp: 20   Temp: 98.3  F (36.8  C)   SpO2: 95%       Physical Exam  Constitutional:       General: She is not in acute distress.     Appearance: She is not diaphoretic.   HENT:      Head: Normocephalic and atraumatic.      Nose: Nose normal. No congestion or rhinorrhea.      Mouth/Throat:      Mouth: Mucous membranes are moist.      Pharynx: Oropharynx is clear.   Eyes:      General:         Right eye: No discharge.         Left eye: No discharge.      Conjunctiva/sclera: Conjunctivae normal.   Cardiovascular:      Rate and Rhythm: Normal rate and regular rhythm.   Pulmonary:      Effort: Pulmonary effort is normal. No respiratory distress.      Breath sounds: Normal breath sounds. No wheezing.   Abdominal:      General: Bowel sounds are normal. There is distension.      Tenderness: There is no tenderness.   Skin:     General: Skin is warm and dry.   Neurological:      Mental Status: She is alert. Mental status is at baseline.   Psychiatric:         Mood and Affect: Mood normal.         Behavior: Behavior normal.           LABS: Labs reviewed; creatinine was 1.89 recently but was 1.99 a while ago BUN and GFR stayed about stable at this time 34 was a BUN and 25 is a GFR.  Sodium potassium calcium within normal limits.      ASSESSMENT:      ICD-10-CM    1. Bipolar affective disorder, remission status unspecified (H) F31.9    2. Bilateral edema of lower extremity R60.0    3. Type 2 diabetes mellitus with complication, without long-term current use of insulin (H) E11.8    4. Chronic Kidney Disease, Stage 4 N18.4        PLAN: Plan at this time will continue to monitor above medical problems and no new changes.  Care plan was reviewed and is appropriate.  As far as her heart rate being listed at 111 regular it was regular but I auscultated and it was about 92.  That was back in September that that rate was listed that high.  Her blood sugars do not seem to be included in the matrix at this  time.  Her chronic kidney disease has stayed relatively stable and I did review back to about a year ago.  I will continue to monitor above medical problems no other changes to care plan at this time.      Total  minutes of which % was spent counseling and coordination of care of the above plan.    Electronically signed by: Sher Monaco DO

## 2021-06-02 NOTE — TELEPHONE ENCOUNTER
This patient's medication list and chart were reviewed as part of the service provided by [facility].    Assessment/Recommendations:    No recommendations at this time.  I do not have access to her blood sugars.  Goal <8-8.5%, avoiding signs and symptoms of hypo or hyperglycemia, reasonable in this elderly patient with multiple co-morbidities, limited life expectancy, and limited function.    Jolie Patel, Pharm.D.,[facility] [place]  Board Certified Geriatric Pharmacist  Medication Therapy Management Pharmacist  [phone]

## 2021-06-03 VITALS
BODY MASS INDEX: 36.36 KG/M2 | DIASTOLIC BLOOD PRESSURE: 56 MMHG | HEART RATE: 97 BPM | TEMPERATURE: 97.7 F | RESPIRATION RATE: 20 BRPM | HEIGHT: 59 IN | SYSTOLIC BLOOD PRESSURE: 113 MMHG

## 2021-06-03 NOTE — PROGRESS NOTES
Correct pharmacy verified with patient and confirmed in snapshot? [x] yes []no    Charge captured ? [x] yes  [] no    Medications Phoned  to Pharmacy [] yes [x]no  Name of Pharmacist:  List Medications, including dose, quantity and instructions      Medication Prescriptions given to patient   [] yes  [x] no   List the name of the drug the prescription was written for.       Medications ordered this visit were e-scribed.  Verified by order class [x] yes  [] no    Medication changes or discontinuations were communicated to patient's pharmacy: [] yes  [x] no, no medication discontinued.    UA collected [] yes  [x] no    [place] Prescription Monitoring Program Reviewed? [] yes  [x] no, no medication to report on the [place] .    Referrals were made to:  NA    Future appointment was made: [x] yes  [] no    Dictation completed at time of chart check: [] yes  [x] no    I have checked the documentation for today s encounters and the above information has been reviewed and completed.

## 2021-06-03 NOTE — PROGRESS NOTES
Reason for visit - follow-up, looking for things to stay as they are.  Son says she needs some direction at times.  Sleep - no issue.  Depression - denies.  Anxiety - denies.  Panic attacks - denies.  SI/HI - denies.  Therapist - none.  Side effects from medication - none noted.    No medication to report on the [place] .

## 2021-06-03 NOTE — PATIENT INSTRUCTIONS - HE
Psychiatric medications:  Abilify 10 mg every morning for mood stabilization.   Wellbutrin  mg every morning.  Patient will not stop taking medications or adjust them without consulting with the provider.  2.Patient will call with any problems between 2 visits.  3.Patient will go to the emergency room if not feeling safe , unable to function in the community, or if suicidal, homicidal or hearing voices or having paranoia.  4.Patient will abstain from drugs and alcohol.  Patient says she does not use any drugs or alcohol.  5.Patient will not drive if sedated on medications or under influence of any substance.  Patient says she does not drive.  6.Patient will not mix psychiatric medications with drugs and alcohol.   7.Patient will watch his diet and exercise.  8.Patient will see non psychiatric providers for non psychiatric disorders.  9. Next appointment in 6 months.

## 2021-06-03 NOTE — PROGRESS NOTES
Outpatient Psychiatric  Progress Note    Date of visit: 11/11/2019         Discussion of Care and Treatment Recommendations:     This is a 88 y.o.  white female with bipolar disorder  She comes for this appointment accompanied by her daughter-in-law.  Patient , her daughter-in-law and I reviewed diagnosis and treatment plan and patient agrees with following recommendations:    1.Patient will take the medications as prescribed.   Psychiatric medications:  Abilify 10 mg every morning for mood stabilization.   Wellbutrin  mg every morning.  Patient will not stop taking medications or adjust them without consulting with the provider.  2.Patient will call with any problems between 2 visits.  3.Patient will go to the emergency room if not feeling safe , unable to function in the community, or if suicidal, homicidal or hearing voices or having paranoia.  4.Patient will abstain from drugs and alcohol.  Patient says she does not use any drugs or alcohol.  5.Patient will not drive if sedated on medications or under influence of any substance.  Patient says she does not drive.  6.Patient will not mix psychiatric medications with drugs and alcohol.   7.Patient will watch his diet and exercise.  8.Patient will see non psychiatric providers for non psychiatric disorders.  9. Next appointment in 6 months.         DIagnoses:       Bipolar disorder manic phase in partial remmision      Diabetes mellitus, chronic knee pain, newly discovered kidney insufficiency    Patient Active Problem List   Diagnosis     Cerumen Impaction     (Lower) Leg Localized Swelling Bilateral     Bipolar Disorder     Cellulitis     Type 2 Diabetes Mellitus     Chronic Kidney Disease With Benign Hypertension     Osteoarthritis Of The Knee     Chronic Kidney Disease, Stage 4     Bipolar I disorder, most recent episode (or current) depressed, mild     Adjustment disorder with mixed anxiety and depressed mood     Bipolar I disorder, most recent  episode (or current) depressed, mild     Bipolar I disorder, most recent episode (or current) depressed, in partial or unspecified remission     Intertrigo             Chief Complaint / Subjective:      Chief complaint: response to medications and functioning in the community     History of Present Illness:Roseanna comes to this visit accompanied by her son. She says she is busy at her nursing home. She says she gets along with her roommate.  She says the room with her.  She says that her roommate mostly sleeps.  She says she is more active than her.  She says generally they do not bother each other.  She says there are a lot of activities and she visits with people.  She says she is social butterfly.  She says appetite is good. Energy is decreased. She takes a lot of naps. She walks with a walker. Her knees hurt , but she does not want to do physical therapy.  Her son is frustrated because she does not want to use Poligrip for dentures and they are falling out.  We discussed risk of choking on it.  And she says she understands, but they take it when she goes to bed so she does not have to worry about it.  They do come off frequently during the interview and she is somehow pushes them back with her tongue.  It seems that it does not bother her much.  She does not hear well even with hearing aids.  Her son says that they tried to adjust it, but it did not work because  It is some type of cheap hearing aids.  Her son says that he has to repeat what he says multiple times.  I had to do it to because she does not hear well, but eventually we do communicate without problems.  She will stay at her place for the holidays, but her children will visit her.  Her son says that she cannot go to his house, because there will be other family members visiting from another state and he says that it is difficult to transport her in a wheelchair.  She says that she is okay with staying at place where she lives.  She says there will be  some type of activities.  She says that her children will visit her there and that is okay.  She says she sleeps well.  Appetite is good.  Energy is at baseline.  Concentration is at baseline.  She says that she sometimes forgets things, but she thinks it is normal for 88-year-old woman.  She denies suicidal and homicidal ideas, delusions and hallucinations.  She denies side effects of medications.  She says they work well.  She wants to continue taking them.  She says she wants everything to stay the same.     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    She finished 10th grade.    She is .    Her  is in a nursing home.    She had 4 children.  3 are living now.  One  in .    She lives in a nursing home.    Her children are supportive of her.    Mental Status Examination today:   General: fair  hygiene, cooperative   Orientatin: ×3  Speech: loud because she has decreased hearing  Language: intact   Thought process: [place]  Thought content: Denies delusions and hallucinations   Suicidal thoughts: Denies absent   Homicidal thoughts: Denies  Associations: connected   Affect: Neutral  Mood: She says good, some mood lability but is controllable  Intellectual functioning: within normal limits   Memory: within normal limits   Fund of knowledge: sufficient   Attention and concentration: Things have to be repeated because she does not hear well, but generally at baseline  Gait: Using a walker  and wheelchair   Psychomotor activity: excited  Muscles: No atrophy, no abnormal movements   InSight and judgment: Fair     Medication adherence: compliant  Medication side effects: absent  Information about medications: Side effects, benefits and  alternative treatments discussed and patient and her daughter-in-law and they agree.    Psychotherapy: Supportive therapy regarding above issues    Education: Diet, exercise, abstinence from drugs and alcohol, patient will not drive if sedated and medications or  under influence of any substance    DISCUS:0 on 11/11/2019, the next is in 6 months     Lab Results: Personally reviewed  Lab Results   Component Value Date    WBC 7.9 02/19/2019    HGB 10.6 (L) 02/19/2019    HCT 34.6 (L) 02/19/2019     02/19/2019    CHOL 130 09/17/2015    TRIG 123 09/17/2015    HDL 49 09/17/2015    ALT 7 09/17/2015    AST 12 09/17/2015     10/03/2019    K 4.7 10/03/2019     (H) 10/03/2019    CREATININE 1.89 (H) 10/03/2019    BUN 34 (H) 10/03/2019    CO2 30 10/03/2019    TSH 1.76 05/20/2019    HGBA1C 6.1 05/20/2019    MICROALBUR 2.12 (H) 02/06/2014       Vital signs:  Vitals:    11/11/19 1510   BP: 113/56   Pulse: 97   Resp: 20   Temp: 97.7  F (36.5  C)         Allergies: Patient has no known allergies.         Medications:       Current Outpatient Medications   Medication Sig     acetaminophen (TYLENOL) 500 MG tablet Take 500 mg by mouth every 6 (six) hours as needed for pain.            allopurinol (ZYLOPRIM) 100 MG tablet Take 100 mg by mouth daily.      ARIPiprazole (ABILIFY) 10 MG tablet Take 1 tablet (10 mg total) by mouth daily.     buPROPion (WELLBUTRIN XL) 150 MG 24 hr tablet Take 1 tablet (150 mg total) by mouth every morning.     furosemide (LASIX) 20 MG tablet Take 10 mg by mouth every other day .           glucose 4 GM chewable tablet Chew 16 g daily as needed for low blood sugar.     insulin detemir U-100 (LEVEMIR) 100 unit/mL injection Inject 18 Units under the skin at bedtime.     latanoprost (XALATAN) 0.005 % ophthalmic solution Administer 1 drop to both eyes at bedtime.     nystatin (MYCOSTATIN) powder Apply 1 application topically 3 (three) times a day as needed.      triamcinolone (KENALOG) 0.1 %  lotion Apply 1 application topically 2 (two) times a day as needed. Apply small amount to upper arms and back as needed.            Review of Systems:    As per history of present illness, otherwise reminder of review of  systems is negative for: General, eyes, ears, nose, throat, neck, respiratory, cardiovascular, gastrointestinal, genitourinary, meniscal skeletal, neurological, hematological and endocrine system.    Coordination of Care:   More than 25 minutes spent on this visit  with more than 50% of time spent on coordination of care including: coordinating care with the nurse,  psychoeducation,  providing supportive therapy regarding above issues.     This note is created with  the help of dictation system.  All grammatical and typing errors or context distortion are  unintentional and inherent to software.    Flor Barrios MD

## 2021-06-04 NOTE — PROGRESS NOTES
Code Status:  DNR/DNI  Visit Type: FVP Care Coordination - Regulatory     Facility:  [facility] [[id number]]         Facility Type:  (Long Term Care, LTC)    History of Present Illness: Roseanna Mcmahan is a 88 y.o. female who I am seeing today for regulatory visit.   Pt with hx of diabetes type II, hypertension, bipolar depression, anemia of chronic disease, chronic kidney disease and LE edema. DJD of the knees. Pt with recent fall sustaining bruising to her face. She also had some shoulder pain however that has resolved. Pt continues to ambulate with rolling walker. Pt with underlying CKD. Creatine trending upward now 2.13. She has continued on lasix for chronic LE edema. No edema to LE today. Previous break down and fungal infection  to her feet. These has resolved. She does have significant hammer toe to the R foot. Recently seen by house eye MD and  started on Latanoprost eye drops at HS. Today pt denies any pain. Bipolar disorder on Abilify and Wellbutrin. Mood stable. DM type well controlled on Levemir.       Active Ambulatory Problems     Diagnosis Date Noted     Cerumen Impaction      (Lower) Leg Localized Swelling Bilateral      Bipolar Disorder      Cellulitis      Type 2 Diabetes Mellitus      Chronic Kidney Disease With Benign Hypertension      Osteoarthritis Of The Knee      Chronic Kidney Disease, Stage 4      Bipolar I disorder, most recent episode (or current) depressed, mild 08/11/2014     Adjustment disorder with mixed anxiety and depressed mood 08/11/2014     Bipolar I disorder, most recent episode (or current) depressed, mild 10/13/2014     Bipolar I disorder, most recent episode (or current) depressed, in partial or unspecified remission 04/13/2015     Intertrigo 12/14/2015     Resolved Ambulatory Problems     Diagnosis Date Noted     No Resolved Ambulatory Problems     No Additional Past Medical History     Current Outpatient Medications   Medication Sig     acetaminophen  (TYLENOL) 500 MG tablet Take 500 mg by mouth every 6 (six) hours as needed for pain.            allopurinol (ZYLOPRIM) 100 MG tablet Take 100 mg by mouth daily.      ARIPiprazole (ABILIFY) 10 MG tablet Take 1 tablet (10 mg total) by mouth daily.     buPROPion (WELLBUTRIN XL) 150 MG 24 hr tablet Take 1 tablet (150 mg total) by mouth every morning.     glucose 4 GM chewable tablet Chew 16 g daily as needed for low blood sugar.     insulin detemir U-100 (LEVEMIR) 100 unit/mL injection Inject 18 Units under the skin at bedtime.     latanoprost (XALATAN) 0.005 % ophthalmic solution Administer 1 drop to both eyes at bedtime.     nystatin (MYCOSTATIN) powder Apply 1 application topically 3 (three) times a day as needed.      triamcinolone (KENALOG) 0.1 % lotion Apply 1 application topically 2 (two) times a day as needed. Apply small amount to upper arms and back as needed.           No Known Allergies    Review of Systems   No fever or chills. No headache, lightheadedness or dizziness. No SOB, chest pains or palpitations. No nausea, vomiting, constipation or diarrhea.  Patient denies any dysuria, frequency, burning or pain with urination. Foot rash healed.  Otherwise review of systems are negative.         PHYSICAL EXAMINATION:  General: Patient seen ambulating in the hallway.  Pleasant.    VS: /81, pulse 85, respirations 16, temperature 97.1, O2 sat 97% RA.  Weight 184 pounds.  HEENT:moist oral mucosa.  Severe hard of hearing.  No aids.  She does wear dentures.  NECK: Supple  CARDIOVASCULAR: S1, S2 without murmur or gallop.   RESPIRATORY: No wheezes, rales or rhonchi.   ABDOMEN: Obese.  Soft, non tender with positive bowel sounds.   EXTREMITIES: Good range of motion on both upper and lower extremities, trace pedal edema, no calf tenderness.  Ambulating with a rolling walker.  NEUROLOGIC: Intact, pulses weak.   PSYCHIATRIC: Mild cognitive impairment.     Assessment and Plan:  1. Bipolar affective disorder, remission  status unspecified (H)  Well controlled with Abilify and Wellbutrin.    2. Type 2 diabetes mellitus with complication, without long-term current use of insulin (H)  Satisfactory controlled with Abilify. HGBA1c 6.1.    3. Chronic Kidney Disease, Stage 4  Creatine trending upward to 2.13. She continues on lasix for LE edema. No LE edema. Stop lasix. Encourage fluids. Follow up BMP on Monday.    4. Tinea pedis of left foot  Continues with topical prophylaxis.    5. Bilateral edema of lower extremity  Monitor edema off Lasix. daily weights for 1 week.      Case Management:  I have reviewed the facility/SNF care plan/MDS which was done 9/23/19, including the falls risk, nutrition and pain screening. I also reviewed the current immunizations, and preventive care.. Future cancer screening indicated is AS NEEDED and provider and pt will formulate a POC.   Patient's desire to return to the community is present, but is not able due to care needs .        Electronically signed by: Kim Reynolds CNP

## 2021-06-05 NOTE — PROGRESS NOTES
[facility]    Facility:   [facility] [[id number]]   Code Status: DNR/DNI      CHIEF COMPLAINT/REASON FOR VISIT:  Chief Complaint   Patient presents with     Review Of Multiple Medical Conditions     Type 2 diabetes, hypertension, bipolar depression, anemia of chronic disease, chronic kidney disease, chronic lower extremity edema, DJD, pain management.       HISTORY:      HPI: Roseanna is a 88 y.o. female who resides at the [facility] with multiple medical problems.  She does have chronic kidney disease with creatinine was trending upward but my last creatinine was about her baseline.  She does have chronic lower extremity edema and she was recently treated for fungal infection in her feet tinea pedis which she was treated appropriately.  According to notes this has resolved.  She does essential hypertension as well as type 2 diabetes was bipolar depression and anemia of chronic disease.    Last hemoglobin was on 2/19/2019 and was 10.6 with a white count of 7.9.  Platelets were normal 193,000.    Patient has no new concerns and staff have no new concerns.  She has been doing very well at this time and there are no new issues reported.    Past Medical History:   Diagnosis Date     (Lower) Leg Localized Swelling Bilateral     Created by Conversion      Adjustment disorder with mixed anxiety and depressed mood 8/11/2014     Bipolar Disorder     Created by Conversion  Replacement Utility updated for latest IMO load     Bipolar I disorder, most recent episode (or current) depressed, in partial or unspecified remission 4/13/2015     Bipolar I disorder, most recent episode (or current) depressed, mild 8/11/2014     Replacement Utility updated for latest IMO load     Cellulitis     Created by Conversion      Cerumen Impaction     Created by Conversion      Chronic Kidney Disease With Benign Hypertension     Created by Conversion      Chronic Kidney Disease, Stage 4      Created by Conversion      Intertrigo 2015     Osteoarthritis Of The Knee     Created by Conversion  Replacement Utility updated for latest IMO load     Type 2 Diabetes Mellitus     Created by Conversion              Family History   Problem Relation Age of Onset     Diabetes Sister      Cancer Sister      Diabetes Brother      Cancer Brother      Diabetes Son      Diabetes Sister      Cancer Son         colon cancer     Social History     Socioeconomic History     Marital status:      Spouse name: None     Number of children: None     Years of education: None     Highest education level: None   Occupational History     None   Social Needs     Financial resource strain: None     Food insecurity:     Worry: None     Inability: None     Transportation needs:     Medical: None     Non-medical: None   Tobacco Use     Smoking status: Former Smoker     Types: Cigarettes     Start date: 1949     Last attempt to quit: 1990     Years since quittin.7     Smokeless tobacco: Never Used   Substance and Sexual Activity     Alcohol use: No     Frequency: Never     Comment: rare     Drug use: No     Sexual activity: Never   Lifestyle     Physical activity:     Days per week: None     Minutes per session: None     Stress: None   Relationships     Social connections:     Talks on phone: None     Gets together: None     Attends [employer] service: None     Active member of club or organization: None     Attends meetings of clubs or organizations: None     Relationship status: None     Intimate partner violence:     Fear of current or ex partner: None     Emotionally abused: None     Physically abused: None     Forced sexual activity: None   Other Topics Concern     None   Social History Narrative     None         Review of Systems   Constitutional:        Patient denies any pain fevers chills nausea vomit diarrhea change in vision hearing taste or smell weakness one side of the chest pain shortness of  breath.  She denies any issues with her bowels or urine and has no polyphagia polydipsia polyuria depression or anxiety and the remainder of the review of systems is negative.       Vitals:    02/06/20 0903   BP: 154/88   Pulse: 89   Resp: 20   Temp: 98.1  F (36.7  C)   SpO2: 96%       Physical Exam  Constitutional:       General: She is not in acute distress.  HENT:      Head: Atraumatic.      Ears:      Comments: Patient has difficulty hearing     Nose: Nose normal. No congestion or rhinorrhea.      Mouth/Throat:      Mouth: Mucous membranes are moist.      Pharynx: Oropharynx is clear.   Eyes:      General:         Right eye: No discharge.         Left eye: No discharge.   Cardiovascular:      Rate and Rhythm: Normal rate and regular rhythm.      Heart sounds: No murmur. No gallop.    Pulmonary:      Effort: Pulmonary effort is normal. No respiratory distress.      Breath sounds: No wheezing.   Abdominal:      General: Bowel sounds are normal.      Tenderness: There is no abdominal tenderness.   Musculoskeletal:      Comments: Patient has trace edema bilateral.   Skin:     General: Skin is dry.   Neurological:      Mental Status: She is alert. Mental status is at baseline.   Psychiatric:         Mood and Affect: Mood normal.         Behavior: Behavior normal.           LABS: On 12/9/2019 basic metabolic profiles is as follows sodium is 144, potassium 4.7, CO2 is 29, calcium was 8.5, BUN was 35, creatinine 1.89, GFR was 25.  A1c 1 year ago was 6.1.      ASSESSMENT:      ICD-10-CM    1. Bipolar affective disorder, remission status unspecified (H) F31.9    2. Type 2 diabetes mellitus with complication, without long-term current use of insulin (H) E11.8    3. Chronic Kidney Disease, Stage 4 N18.4    4. Bilateral edema of lower extremity R60.0    5. Confusion R41.0        PLAN: Plan at this time no new changes.  Would recommend on next visit by nurse practitioner that we will likely check her basic metabolic profile  to continue to monitor her kidneys.  She does not seem to have any issues at this time it seems her tinea pedis has resolved.  Behaviors have been stable at this time and her blood sugars are well controlled.  I will continue to monitor above medical problems and no other changes to care plan at this time.      Total  minutes of which % was spent counseling and coordination of care of the above plan.    Electronically signed by: Sher Monaco DO

## 2021-06-07 NOTE — PROGRESS NOTES
Code Status:  DNR/DNI  Visit Type: FVP Care Coordination - Regulatory     Facility:  [facility] [[id number]]         Facility Type:  (Long Term Care, LTC)    History of Present Illness: Roseanna Mcmahan is a 89 y.o. female who I am seeing today for regulatory visit.   Pt with hx of diabetes type II, hypertension, bipolar depression, anemia of chronic disease, chronic kidney disease and LE edema. DJD of the knees.  Patient with recurrent UTI.  Initially treated  on 4/9 for UTI. Patient presented with increased confusion and lethargy.  She also had low-grade temps.  Urine culture was positive for greater than 100,000 E. coli.  Late last week patient presented with-like symptoms.  She also had low-grade temp.  UA UC collected.  Patient had completed her antibiotic therapy.  Urine culture ,000 enterococcus species.  Today nursing staff report patient  symptoms improving.  Patient afebrile.  Patient continues on Bactrim DS twice daily with stop date of 4/26/2020.  Patient continues to ambulate with a rolling walker.  DJD of multiple joints.  PRN Tylenol available.  Patient with underlying bipolar disorder.  Symptoms well controlled with Abilify and Wellbutrin.  Diabetes mellitus type 2.  Blood sugar satisfactory on Levemir.  History of gout on allopurinol. Increasing cognitive impairment noted.      Active Ambulatory Problems     Diagnosis Date Noted     Cerumen Impaction      (Lower) Leg Localized Swelling Bilateral      Bipolar Disorder      Cellulitis      Type 2 Diabetes Mellitus      Chronic Kidney Disease With Benign Hypertension      Osteoarthritis Of The Knee      Chronic Kidney Disease, Stage 4      Bipolar I disorder, most recent episode (or current) depressed, mild 08/11/2014     Adjustment disorder with mixed anxiety and depressed mood 08/11/2014     Bipolar I disorder, most recent episode (or current) depressed, mild 10/13/2014     Bipolar I disorder, most recent episode (or current)  depressed, in partial or unspecified remission 04/13/2015     Intertrigo 12/14/2015     Resolved Ambulatory Problems     Diagnosis Date Noted     No Resolved Ambulatory Problems     No Additional Past Medical History     Current Outpatient Medications   Medication Sig     acetaminophen (TYLENOL) 500 MG tablet Take 500 mg by mouth every 6 (six) hours as needed for pain.            allopurinol (ZYLOPRIM) 100 MG tablet Take 100 mg by mouth daily.      ARIPiprazole (ABILIFY) 10 MG tablet Take 1 tablet (10 mg total) by mouth daily.     buPROPion (WELLBUTRIN XL) 150 MG 24 hr tablet Take 1 tablet (150 mg total) by mouth every morning.     glucose 4 GM chewable tablet Chew 16 g daily as needed for low blood sugar.     insulin detemir U-100 (LEVEMIR) 100 unit/mL injection Inject 18 Units under the skin at bedtime.     latanoprost (XALATAN) 0.005 % ophthalmic solution Administer 1 drop to both eyes at bedtime.     nystatin (MYCOSTATIN) powder Apply 1 application topically 3 (three) times a day as needed.      triamcinolone (KENALOG) 0.1 % lotion Apply 1 application topically 2 (two) times a day as needed. Apply small amount to upper arms and back as needed.           No Known Allergies    Review of Systems   No fever or chills. No headache, lightheadedness or dizziness. No SOB, chest pains or palpitations. No nausea, vomiting, constipation or diarrhea.  Patient denies any dysuria, frequency, burning or pain with urination. Foot rash healed.  Otherwise review of systems are negative.         PHYSICAL EXAMINATION:  General: Patient sitting up in bedside chair. Pleasant.    VS: /83, pulse 90, respiration 16, temperature 97.8, O2 sat 93% RA.  Weight 179.3 pounds.  HEENT:moist oral mucosa.  Severe hard of hearing.  No aids.  She does wear dentures.  NECK: Supple  ABDOMEN: Obese.     EXTREMITIES: Good range of motion on both upper and lower extremities, trace pedal edema, no calf tenderness.   NEUROLOGIC: Intact  PSYCHIATRIC:  Increasing cognitive impairment.     Limited physical assessment secondary to COVID-19 precautions.    Assessment and Plan:  1. Recurrent UTI   continue Bactrim DS was started 4/26/2020.  Encourage fluids.   2. Type 2 diabetes mellitus with complication, without long-term current use of insulin (H)   well-controlled on Levemir.   3. Bipolar affective disorder, remission status unspecified (H)   well-controlled with Abilify and Wellbutrin.   4. Chronic Kidney Disease, Stage 4   stable.   5. Bilateral edema of lower extremity   improved.  No longer requiring Lasix.     Case Management:  I have reviewed the facility/SNF care plan/MDS which was done 2/27/2020, including the falls risk, nutrition and pain screening. I also reviewed the current immunizations, and preventive care.. Future cancer screening is not clinically indicated secondary to age/goals of care.   Patient's desire to return to the community is present, but is not able due to care needs .      Electronically signed by: Kim Reynolds, JUANI

## 2021-06-08 NOTE — PROGRESS NOTES
"[facility]   Video Visit    Code Status: DNR/DNI    Roseanna Mcmahan is a 89 y.o. female who is being evaluated via a billable video visit.      The patient has been notified of following:     \"This video visit will be conducted via a call between you and your physician/provider. We have found that certain health care needs can be provided without the need for an in-person physical exam.  This service lets us provide the care you need with a video conversation.  If a prescription is necessary we can send it to the facility team.  If lab work is needed we can place an order through the facility team to have that test done at a later time.    If during the course of the call the physician/provider feels a video visit is not appropriate, you will not be charged for this service.\"     Physician/provider has received verbal consent for a Video Visit from the patient? Yes        Video Start Time: 10:30 pm     Chief Complaint/Reason for Visit:  Chief Complaint   Patient presents with     Problem Visit     MARGOTH       HPI:   Roseanna is a 89 y.o. female who I am seeing today for follow up of acute kidney injury with UTI. Pt with recent fall x2 without injury, increased weakness and confusion. Past medical history includes diabetes type II, hypertension, bipolar depression, anemia of chronic disease, chronic kidney disease and LE edema. DJD of the knees. UA/UC obtained and showed positive nitrates, leukocytes and bacteria. Pt with low grade temps in the 100s. CBC and BMP obtained. WBC 11.6. Creatine 2.38 and GFR 19. Baseline creatine 1.6-1.8. Pt no longer on diuretics. Fluids have been pushed over the last 24 hours. BP on the soft side. Pt given 1 gm of IM Rocephin this am.     Active Ambulatory Problems     Diagnosis Date Noted     Cerumen Impaction      (Lower) Leg Localized Swelling Bilateral      Bipolar Disorder      Cellulitis      Type 2 Diabetes Mellitus      Chronic Kidney Disease With Benign " Hypertension      Osteoarthritis Of The Knee      Chronic Kidney Disease, Stage 4      Bipolar I disorder, most recent episode (or current) depressed, mild 08/11/2014     Adjustment disorder with mixed anxiety and depressed mood 08/11/2014     Bipolar I disorder, most recent episode (or current) depressed, mild 10/13/2014     Bipolar I disorder, most recent episode (or current) depressed, in partial or unspecified remission 04/13/2015     Intertrigo 12/14/2015     Resolved Ambulatory Problems     Diagnosis Date Noted     No Resolved Ambulatory Problems     No Additional Past Medical History     Current Outpatient Medications   Medication Sig     acetaminophen (TYLENOL) 500 MG tablet Take 500 mg by mouth every 6 (six) hours as needed for pain.            allopurinol (ZYLOPRIM) 100 MG tablet Take 100 mg by mouth daily.      ARIPiprazole (ABILIFY) 10 MG tablet Take 1 tablet (10 mg total) by mouth daily.     buPROPion (WELLBUTRIN XL) 150 MG 24 hr tablet Take 1 tablet (150 mg total) by mouth every morning.     glucose 4 GM chewable tablet Chew 16 g daily as needed for low blood sugar.     insulin detemir U-100 (LEVEMIR) 100 unit/mL injection Inject 18 Units under the skin at bedtime.     latanoprost (XALATAN) 0.005 % ophthalmic solution Administer 1 drop to both eyes at bedtime.     nystatin (MYCOSTATIN) powder Apply 1 application topically 3 (three) times a day as needed.      triamcinolone (KENALOG) 0.1 % lotion Apply 1 application topically 2 (two) times a day as needed. Apply small amount to upper arms and back as needed.       Review of Systems   No fevers or chills. No headache, lightheadedness or dizziness. No SOB, chest pains or palpitations. Appetite is good. No nausea, vomiting, constipation or diarrhea. No dysuria, frequency, burning or pain with urination. Otherwise review of systems are negative.     Physical Exam:   VS: /69, P 98, R 18, T 97.8, O2 sat 94% on RA.       MEDICATION LIST:  Current  Outpatient Medications   Medication Sig     acetaminophen (TYLENOL) 500 MG tablet Take 500 mg by mouth every 6 (six) hours as needed for pain.            allopurinol (ZYLOPRIM) 100 MG tablet Take 100 mg by mouth daily.      ARIPiprazole (ABILIFY) 10 MG tablet Take 1 tablet (10 mg total) by mouth daily.     buPROPion (WELLBUTRIN XL) 150 MG 24 hr tablet Take 1 tablet (150 mg total) by mouth every morning.     glucose 4 GM chewable tablet Chew 16 g daily as needed for low blood sugar.     insulin detemir U-100 (LEVEMIR) 100 unit/mL injection Inject 18 Units under the skin at bedtime.     latanoprost (XALATAN) 0.005 % ophthalmic solution Administer 1 drop to both eyes at bedtime.     nystatin (MYCOSTATIN) powder Apply 1 application topically 3 (three) times a day as needed.      triamcinolone (KENALOG) 0.1 % lotion Apply 1 application topically 2 (two) times a day as needed. Apply small amount to upper arms and back as needed.       Labs:  Lab Results   Component Value Date    WBC 11.6 (H) 06/11/2020    HGB 11.1 (L) 06/11/2020    HCT 36.5 06/11/2020     06/11/2020     06/11/2020     Results for orders placed or performed in visit on 06/11/20   Basic Metabolic Panel   Result Value Ref Range    Sodium 141 136 - 145 mmol/L    Potassium 4.0 3.5 - 5.0 mmol/L    Chloride 104 98 - 107 mmol/L    CO2 27 22 - 31 mmol/L    Anion Gap, Calculation 10 5 - 18 mmol/L    Glucose 91 70 - 125 mg/dL    Calcium 8.1 (L) 8.5 - 10.5 mg/dL    BUN 36 (H) 8 - 28 mg/dL    Creatinine 2.58 (H) 0.60 - 1.10 mg/dL    GFR MDRD Af Amer 21 (L) >60 mL/min/1.73m2    GFR MDRD Non Af Amer 17 (L) >60 mL/min/1.73m2       Lab Results   Component Value Date    COLORU Yellow 06/10/2020    CLARITYU Turbid (!) 06/10/2020    GLUCOSEU Negative 06/10/2020    BILIRUBINUR Negative 06/10/2020    KETONESU Negative 06/10/2020    SPECGRAV 1.022 06/10/2020    HGBUA Small (!) 06/10/2020    PHUR 5.5 06/10/2020    PROTEINUA 200 mg/dL (!) 06/10/2020    UROBILINOGEN  <2.0 E.U./dL 06/10/2020    NITRITE Positive (!) 06/10/2020    LEUKOCYTESUR Large (!) 06/10/2020    BACTERIA Many (!) 06/10/2020    RBCUA 0-2 06/10/2020    WBCUA >100 (!) 06/10/2020    SQUAMEPI 0-5 06/10/2020           Assessment/Plan:  1. Recurrent UTI  UA positive. Pt with fever and confusion.   1 gm IM Rocephin given this am.   Transition to orals when UC received.    2. Acute renal failure superimposed on chronic kidney disease, unspecified CKD stage, unspecified acute renal failure type (H)  Creatine now 2.38. GFR 19. Baseline 1.6-1.9.   Give 1L NS at 100 cc/hr.   Repeat BMP in am.    3. Fall, initial encounter  Monitor. Alarm applied.    4. Confusion  Due to UTI.    5. Type 2 diabetes mellitus with complication, without long-term current use of insulin (H)  Continues on Insulin.      Video-Visit Details    Type of service:  Video Visit    Video End Time 11:25 pm     Originating Location (pt. Location):[facility] [[id number]]    Distant Location (provider location):  [facility]     Mode of Communication:   Phone Conference with pt and facility nurse     Kim Reynolds, CNP

## 2021-06-08 NOTE — PROGRESS NOTES
Code Status:  DNR/DNI  Visit Type: Problem Visit (Acute on CKD)     Facility:  [facility] [[id number]]         Facility Type:  (Long Term Care, LTC)    History of Present Illness: Roseanna Mcmahan is a 89 y.o. female who I am seeing today for follow up of recent UTI and acute on chronic kidney disease.  Past medical history includes diabetes type II, hypertension, bipolar depression, anemia of chronic disease, chronic kidney disease and LE edema. DJD of the knees.      Today patient sitting up in bedside chair. Pt received 2 liters of fluid since late last week. She is more alert. Less confusion. She is a febrile. She received 1 dose of IM Rocephin. She continues on oral Cipro. UC positive for >100,000 col/ml Enterobacter cloacae. Pt is eating per usual. Blood pressures improved with fluids. Creatine yesterday 2.38. Previously 2.58. Baseline creatine of 1.6-1.9. Pt denies any pain. Nursing staff attempted to draw repeat labs today and pt refused.       Active Ambulatory Problems     Diagnosis Date Noted     Cerumen Impaction      (Lower) Leg Localized Swelling Bilateral      Bipolar Disorder      Cellulitis      Type 2 Diabetes Mellitus      Chronic Kidney Disease With Benign Hypertension      Osteoarthritis Of The Knee      Chronic Kidney Disease, Stage 4      Bipolar I disorder, most recent episode (or current) depressed, mild 08/11/2014     Adjustment disorder with mixed anxiety and depressed mood 08/11/2014     Bipolar I disorder, most recent episode (or current) depressed, mild 10/13/2014     Bipolar I disorder, most recent episode (or current) depressed, in partial or unspecified remission 04/13/2015     Intertrigo 12/14/2015     Resolved Ambulatory Problems     Diagnosis Date Noted     No Resolved Ambulatory Problems     No Additional Past Medical History     Current Outpatient Medications   Medication Sig     acetaminophen (TYLENOL) 500 MG tablet Take 500 mg by mouth every 6 (six) hours as  needed for pain.            allopurinol (ZYLOPRIM) 100 MG tablet Take 100 mg by mouth daily.      ARIPiprazole (ABILIFY) 10 MG tablet Take 1 tablet (10 mg total) by mouth daily.     buPROPion (WELLBUTRIN XL) 150 MG 24 hr tablet Take 1 tablet (150 mg total) by mouth every morning.     glucose 4 GM chewable tablet Chew 16 g daily as needed for low blood sugar.     insulin detemir U-100 (LEVEMIR) 100 unit/mL injection Inject 18 Units under the skin at bedtime.     latanoprost (XALATAN) 0.005 % ophthalmic solution Administer 1 drop to both eyes at bedtime.     nystatin (MYCOSTATIN) powder Apply 1 application topically 3 (three) times a day as needed.      triamcinolone (KENALOG) 0.1 % lotion Apply 1 application topically 2 (two) times a day as needed. Apply small amount to upper arms and back as needed.           No Known Allergies    Review of Systems   Somewhat of poor historian.  No fever or chills. No headache, lightheadedness or dizziness.  Patient denies SOB, no chest pains or palpitations. No nausea, vomiting, constipation or diarrhea.  Patient denies any dysuria, frequency, burning or pain with urination. Otherwise review of systems are negative.         PHYSICAL EXAMINATION:  General: Patient sitting up in bedside chair.   VS: /61, pulse 78, respirations 16, temperature 98, O2 sat 95 % RA.  Weight 177.1 pounds.  HEENT:moist oral mucosa.  Severe hard of hearing.  No aids.  She does wear dentures which are loose in her mouth.  Facial symmetry.  Tongue is midline.  NECK: Supple  CARDIOVASCULAR: S1, S2 without murmur or gallop.   RESPIRATORY: No wheezing, rales or rhonchi.   ABDOMEN: Obese.     EXTREMITIES: Movesboth upper and lower extremities with generalized weakness, trace pedal edema, no calf tenderness.   equal.  Patient able to overcome gravity.  NEUROLOGIC: Intact  PSYCHIATRIC: Advanced cognitive impairment. Less confused today.       Assessment and Plan:  1. Recurrent UTI  Continues on Cipro  stop date 6/17/20.   2. Acute renal failure superimposed on chronic kidney disease, unspecified CKD stage, unspecified acute renal failure type (H)  Pt received 2 L of fluids.   Creatine yesterday 2.38. Prev 2.58. Baseline 1.6-1.9.   Pt refused lab draw today. Will re attempt in am.   Push fluids orally.    3. Chronic Kidney Disease, Stage 4  See above.      I did speak with pt daughter n law today in regards to overall prognosis and plan of care. Pt may require another liter of fluid. Will await labs.     Electronically signed by: Kim Reynolds, CNP

## 2021-06-08 NOTE — PROGRESS NOTES
Code Status:  DNR/DNI  Visit Type: Problem Visit (confusion, weakness, falls )     Facility:  [facility] [[id number]]         Facility Type:  (Long Term Care, LTC)    History of Present Illness: Roseanna Mcmahan is a 89 y.o. female who I am seeing today at the request of the nursing staff. Pt with fall x2 without injury, increased weakness and confusion.  Past medical history includes diabetes type II, hypertension, bipolar depression, anemia of chronic disease, chronic kidney disease and LE edema. DJD of the knees.  Today patient sitting up in the day room.  She is eating her breakfast.  Patient appears dazed and confused.  She does attempt to answer questions.  Weakness noted.  Expiratory wheezing with coughing with food.  Patient afebrile.  However would look back in the record BP earlier this a.m. was 88/53.  She has consistently had heart rates in the 100s ranging from 106-116.  At O2 sats normal on room air.  Patient with past history of UTI. DJD of multiple joints.  PRN Tylenol available.  Patient with underlying bipolar disorder.  Symptoms well controlled with Abilify and Wellbutrin.  Diabetes mellitus type 2.  Blood sugar satisfactory on Levemir.  History of gout on allopurinol.       Active Ambulatory Problems     Diagnosis Date Noted     Cerumen Impaction      (Lower) Leg Localized Swelling Bilateral      Bipolar Disorder      Cellulitis      Type 2 Diabetes Mellitus      Chronic Kidney Disease With Benign Hypertension      Osteoarthritis Of The Knee      Chronic Kidney Disease, Stage 4      Bipolar I disorder, most recent episode (or current) depressed, mild 08/11/2014     Adjustment disorder with mixed anxiety and depressed mood 08/11/2014     Bipolar I disorder, most recent episode (or current) depressed, mild 10/13/2014     Bipolar I disorder, most recent episode (or current) depressed, in partial or unspecified remission 04/13/2015     Intertrigo 12/14/2015     Resolved Ambulatory  Problems     Diagnosis Date Noted     No Resolved Ambulatory Problems     No Additional Past Medical History     Current Outpatient Medications   Medication Sig     acetaminophen (TYLENOL) 500 MG tablet Take 500 mg by mouth every 6 (six) hours as needed for pain.            allopurinol (ZYLOPRIM) 100 MG tablet Take 100 mg by mouth daily.      ARIPiprazole (ABILIFY) 10 MG tablet Take 1 tablet (10 mg total) by mouth daily.     buPROPion (WELLBUTRIN XL) 150 MG 24 hr tablet Take 1 tablet (150 mg total) by mouth every morning.     glucose 4 GM chewable tablet Chew 16 g daily as needed for low blood sugar.     insulin detemir U-100 (LEVEMIR) 100 unit/mL injection Inject 18 Units under the skin at bedtime.     latanoprost (XALATAN) 0.005 % ophthalmic solution Administer 1 drop to both eyes at bedtime.     nystatin (MYCOSTATIN) powder Apply 1 application topically 3 (three) times a day as needed.      triamcinolone (KENALOG) 0.1 % lotion Apply 1 application topically 2 (two) times a day as needed. Apply small amount to upper arms and back as needed.           No Known Allergies    Review of Systems   Somewhat of poor historian. + falls. No injury. No fever or chills. No headache, lightheadedness or dizziness.  Patient denies SOB, + wheezing and coughing with food, no chest pains or palpitations. No nausea, vomiting, constipation or diarrhea.  Patient denies any dysuria, frequency, burning or pain with urination. Otherwise review of systems are negative.         PHYSICAL EXAMINATION:  General: Patient sitting up at table in day room, eating brunch.   VS: BP 88/53, pulse 88, respirations 24, temperature 98.5, O2 sat 95 % RA.  Weight 179.8 pounds.  HEENT:moist oral mucosa.  Severe hard of hearing.  No aids.  She does wear dentures which are loose in her mouth.  Facial symmetry.  Tongue is midline.  NECK: Supple  CARDIOVASCULAR: S1, S2 without murmur or gallop.   RESPIRATORY: Expiratory wheezing noted.  Occasional coughing with  eating.  ABDOMEN: Obese.     EXTREMITIES: Movesboth upper and lower extremities with generalized weakness, trace pedal edema, no calf tenderness.   equal.  Patient able to overcome gravity.  NEUROLOGIC: Intact  PSYCHIATRIC: Advanced cognitive impairment.       Assessment and Plan:  1. Confusion  Underlying cognitive impairment with increased confusion  Check CBC, BMP, BNP and pro calcitonin.    2. Fall, initial encounter  No acute injury.    3. Wheezing  CXR 2 view to rule out fluid overload vs aspiration pneumonia.   Speech therapy to eval and treat for dysphasia.  Ill fitting dentures.   4. Bipolar affective disorder, remission status unspecified (H)  Continues on Abilify and Wellbutrin    5. Type 2 diabetes mellitus with complication, without long-term current use of insulin (H)   well-controlled with Levemir.   6. Chronic Kidney Disease, Stage 4   baseline creatinine around 1.9.  Follow-up BMP.  Push fluids.  Patient no longer on Lasix.   7. Recurrent UTI   UA /UC.           Electronically signed by: Kim Reynolds, CNP

## 2021-06-08 NOTE — PROGRESS NOTES
[facility]    Facility:   [facility] [[id number]]   Code Status: DNR/DNI  85-year-old woman has resided in long-term care for the past year.She had been residing in assisted living but was losing her ability to live independently.  She has a past history of psychosis with bipolar disorder.  No recent change in her psychiatric medications.  Since admission to long-term care she has made an excellent adjustment, participates in activities, it is shown improvement in her medical morbidities    1. Diabetes mellitus  Regimen consists of Lantus 15 units HS and Novolog 5 units BID.  On these meds her A1c in December was 6.1.  Review of blood sugar testing shows all tests have been less than 170.  No reported hypoglycemia.  Will continue current regimen   2. Bipolar disorder  Long-standing.  Abilify 5 mg bupropion 150 ER daily.Tries to engage in conversation although some of her conversation is somewhat stereotyped she is pleasant to be around and there been no significant behavior disorders reported.  She demonstrates no Parkinsonian features.  No excessive somnolence.  Describes herself as sleeping well.  Would make no changes in her psychotropic medication unless there are evidences of side effects or worsening behavior.  Psychiatry notes are not available for review.  In view of her stable condition it is inappropriate to make changes in her medications that she is demonstrating stability without evidence of side effects   3. Anemia, unspecified type  Very slow gradual decline in hemoglobin.  September 2015 - 12.3.  March 2016 - 10.7.  September 2016 - 10.6.  She has CK D4 which is undoubtedly the source of the anemia. Will recheck to monitor the trend And review Red cell indices and platelets   4. CKD (chronic kidney disease), stage 4 (severe)  CK D4has actually improved over the past year.  Creatinine 2.78 November 2015.  2.3 May 2016.  2.04 December 19.  Meds of gradually been  reduced.  Encouraging good hydration, reduction of medication, improved nutrition, and improve diabetic control have probably all contributed to improvement in renal function   5. Bilateral edema of lower extremity  A year ago she demonstrated severe3+ bilateral lower extremity edema.  Initially treated with furosemide and wrapping.  Dramatic reduction in edema.  Wrapping is no longer needed.  No pitting noted on today's exam of the skin is intact and well cared for.  Uses [id number] mg every other day with good control.  Electrolytes were recently determined and normal   6. Essential hypertension  Blood pressures in the normal range on no medications   7. Urinary frequency  Long-term complaint of urinary frequency and nocturia times 2to 3.  Last documented probable urinary infection November 2015.  Last normal urinalysis may 2016.  Suspect related to aging bladder symptoms.  However will recheck urinalysis and culture looking for glycosuria, pyuria, or proteinuria     Exam - vital signs reviewed over the past month normal and stable.  Weight has been stable.  Gen. - patient is seen sitting in her recliner.  She greets with a smile and engages in conversation.  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  HEENT - conjunctivae appear slightly pale.  Sclera white.  Moist mucous membranes.  Neck - very thick neck.  Difficult to assess neck veins.  No thyroid enlargement.  No lymphadenopathy.  Lungs - clear bilaterally.  Heart - regular rate and rhythm.  Abdomen - obese.  Normally active bowel sounds.  Extremities - no edema.  Skin intact without callous her ulceration.    Assessment - stable problem management.  Ongoing complaint of urinary symptoms.  Slowly declining hemoglobin.  Plan - reassess urinalysis.  Reassess CBC.  Continue current management        Electronically signed by: Jairo Sifuentes MD

## 2021-06-08 NOTE — TELEPHONE ENCOUNTER
Writer called to check in for Appt.  Several attempts to reach [facility] . Called Main number was transferred to to [facility].    Per [facility]: Notifed via staff that Per Family this appointment was to be canceled by Pt's son Hong. Writer verified that had not occurred.   Writer notified Dr. Barrios. Per her directive: Call Hong (son) and ask why was appointment canceled and make sure Roseanna is well.     [facility]-director had stated that Roseanna was in room and offered to bring her into the office for call, but to honor the family's request to cancel this appointment writer declined.     Writer left message at Hong's number asking him to call and leave a message as to why the cancellation regarding this appointment. Pt is also in need of refills per care center.

## 2021-06-08 NOTE — PROGRESS NOTES
Code Status:  DNR/DNI  Visit Type: [facility] Care Coordination - Home Visit-Annual Assessment     Facility:  [facility] [[id number]]         Facility Type:  (Long Term Care, LTC)    History of Present Illness: Roseanna Mcmahan is a 89 y.o. female who I am seeing today for annual visit.   Pt with hx of diabetes type II, hypertension, bipolar depression, anemia of chronic disease, chronic kidney disease and LE edema. DJD of the knees. Pt with recent fall. Pt slide out of her recliner. Non skid material added to seat of chair. Pt denies any injury or pain. Pt with UTI on 4/9/20. Urine culture ,000 enterococcus species.  Pt treated with antibiotics due to increased confusion, fall and lethargy. She has had no further symptoms. Patient continues to ambulate with a rolling walker.  DJD of multiple joints.  PRN Tylenol available.  Patient with underlying bipolar disorder.  Symptoms well controlled with Abilify and Wellbutrin.  Diabetes mellitus type 2.  Blood sugar satisfactory on Levemir.  History of gout on allopurinol. Increasing cognitive impairment noted.      Active Ambulatory Problems     Diagnosis Date Noted     Cerumen Impaction      (Lower) Leg Localized Swelling Bilateral      Bipolar Disorder      Cellulitis      Type 2 Diabetes Mellitus      Chronic Kidney Disease With Benign Hypertension      Osteoarthritis Of The Knee      Chronic Kidney Disease, Stage 4      Bipolar I disorder, most recent episode (or current) depressed, mild 08/11/2014     Adjustment disorder with mixed anxiety and depressed mood 08/11/2014     Bipolar I disorder, most recent episode (or current) depressed, mild 10/13/2014     Bipolar I disorder, most recent episode (or current) depressed, in partial or unspecified remission 04/13/2015     Intertrigo 12/14/2015     Resolved Ambulatory Problems     Diagnosis Date Noted     No Resolved Ambulatory Problems     No Additional Past Medical History     Current Outpatient  Medications   Medication Sig     acetaminophen (TYLENOL) 500 MG tablet Take 500 mg by mouth every 6 (six) hours as needed for pain.            allopurinol (ZYLOPRIM) 100 MG tablet Take 100 mg by mouth daily.      ARIPiprazole (ABILIFY) 10 MG tablet Take 1 tablet (10 mg total) by mouth daily.     buPROPion (WELLBUTRIN XL) 150 MG 24 hr tablet Take 1 tablet (150 mg total) by mouth every morning.     glucose 4 GM chewable tablet Chew 16 g daily as needed for low blood sugar.     insulin detemir U-100 (LEVEMIR) 100 unit/mL injection Inject 18 Units under the skin at bedtime.     latanoprost (XALATAN) 0.005 % ophthalmic solution Administer 1 drop to both eyes at bedtime.     nystatin (MYCOSTATIN) powder Apply 1 application topically 3 (three) times a day as needed.      triamcinolone (KENALOG) 0.1 % lotion Apply 1 application topically 2 (two) times a day as needed. Apply small amount to upper arms and back as needed.           No Known Allergies    Review of Systems   No fever or chills. No headache, lightheadedness or dizziness. No SOB, chest pains or palpitations. No nausea, vomiting, constipation or diarrhea.  Patient denies any dysuria, frequency, burning or pain with urination. Foot rash healed.  Otherwise review of systems are negative.         PHYSICAL EXAMINATION:  General: Patient sitting up in bedside chair. Pleasant.    VS: /80, pulse 67, respiration 20, temperature 98.7, O2 sat 97% RA.  Weight 181.2 pounds.  HEENT:moist oral mucosa.  Severe hard of hearing.  No aids.  She does wear dentures.  NECK: Supple  CARDIOVASCULAR: S1, S2 without murmur or gallop.   RESPIRATORY: No wheezes, rales or rhonchi.   ABDOMEN: Obese.     EXTREMITIES: Good range of motion on both upper and lower extremities, trace pedal edema, no calf tenderness.   NEUROLOGIC: Intact  PSYCHIATRIC: Increasing cognitive impairment.       Assessment and Plan:  1. Bipolar affective disorder, remission status unspecified (H)  Controlled on  Abilify and Wellbutrin.    2. Type 2 diabetes mellitus with complication, without long-term current use of insulin (H)  Well controlled on Levemir. HgbA1c 1.   F/u HgbA1c.    3. Bilateral edema of lower extremity  Well controlled. She is no longer on Lasix due to CKD. Weight stable.    4. Chronic Kidney Disease, Stage 4  Creatine 1.89, Baseline 1.9-2.0.   Follow up CBC and BMP on Thursday.        Case Management:  I have reviewed the facility/SNF care plan/MDS which was done 03/19/20, including the falls risk, nutrition and pain screening. I also reviewed the current immunizations, and preventive care.. Future cancer screening is not clinically indicated secondary to age/goals of care.   Patient's desire to return to the community is present, but is not able due to care needs .    Advance Directive Discussion:    I reviewed the current advanced directives as reflected in EPIC and the facility chart. I did not due to cognitive impairment review the advance directives with the resident.     Team Discussion:  I communicated with the appropriate disciplines involved with the Plan of Care:   Nursing      Patient Goal:  Patient's goal is unobtainable secondary to cognitive impairment.    Information reviewed:  Medications, vital signs, orders, and nursing notes.      Electronically signed by: Kim Reynolds CNP           Electronically signed by: Kim Reynolds CNP

## 2021-06-09 NOTE — PROGRESS NOTES
[facility]    Facility:   [facility] [[id number]]   Code Status: DNR/DNI      CHIEF COMPLAINT/REASON FOR VISIT:  Chief Complaint   Patient presents with     Review Of Multiple Medical Conditions       HISTORY:      HPI: Roseanna is a 89 y.o. female resides here at [facility] for multiple medical problems she is 89 years old she is a type 2 diabetes and she also has hypertension bipolar depression.  This is been pretty much stable and in remission from notes a red and she does have anemia of chronic disease.  Patient does have hypertension recent blood pressures 160/80 we will recheck that.  She does ambulate with a rolling walker at this time.  Recent blood sugars is as follows 172, 158 177 and there on 6/10/2020 7/1/2020 and 7/1/2020.  No other blood sugars are available in chart.    Patient claims she is doing well today has no concerns she is moving her bowels well and eating well and nursing staff indicates she is taking fluids without any issues.  She has no pain issues at this time and denies any other issues and that is reiterated and staff.    Past Medical History:   Diagnosis Date     (Lower) Leg Localized Swelling Bilateral     Created by Conversion      Adjustment disorder with mixed anxiety and depressed mood 8/11/2014     Bipolar Disorder     Created by Conversion  Replacement Utility updated for latest IMO load     Bipolar I disorder, most recent episode (or current) depressed, in partial or unspecified remission 4/13/2015     Bipolar I disorder, most recent episode (or current) depressed, mild 8/11/2014     Replacement Utility updated for latest IMO load     Cellulitis     Created by Conversion      Cerumen Impaction     Created by Conversion      Chronic Kidney Disease With Benign Hypertension     Created by Conversion      Chronic Kidney Disease, Stage 4     Created by Conversion      Intertrigo 12/14/2015     Osteoarthritis Of The Knee      Created by Conversion  Replacement Utility updated for latest IMO load     Type 2 Diabetes Mellitus     Created by Conversion              Family History   Problem Relation Age of Onset     Diabetes Sister      Cancer Sister      Diabetes Brother      Cancer Brother      Diabetes Son      Diabetes Sister      Cancer Son         colon cancer     Social History     Socioeconomic History     Marital status:      Spouse name: None     Number of children: None     Years of education: None     Highest education level: None   Occupational History     None   Social Needs     Financial resource strain: None     Food insecurity     Worry: None     Inability: None     Transportation needs     Medical: None     Non-medical: None   Tobacco Use     Smoking status: Former Smoker     Types: Cigarettes     Start date: 1949     Last attempt to quit: 1990     Years since quittin.1     Smokeless tobacco: Never Used   Substance and Sexual Activity     Alcohol use: No     Frequency: Never     Comment: rare     Drug use: No     Sexual activity: Never   Lifestyle     Physical activity     Days per week: None     Minutes per session: None     Stress: None   Relationships     Social connections     Talks on phone: None     Gets together: None     Attends Gnosticism service: None     Active member of club or organization: None     Attends meetings of clubs or organizations: None     Relationship status: None     Intimate partner violence     Fear of current or ex partner: None     Emotionally abused: None     Physically abused: None     Forced sexual activity: None   Other Topics Concern     None   Social History Narrative     None         Review of Systems   Constitutional:        Patient denies any pain fevers chills nausea vomit diarrhea change in vision hearing taste or smell weakness 1 side of the chest pain shortness of breath.  She denies any current shortness of polyphasia polydipsia polyuria depression or anxiety  and the main review of systems negative.       Vitals:    07/06/20 1248   BP: 160/81   Pulse: 69   Resp: 20   Temp: 97.8  F (36.6  C)   SpO2: 97%       Physical Exam  Constitutional:       General: She is not in acute distress.     Appearance: She is not toxic-appearing.   HENT:      Head: Normocephalic and atraumatic.   Eyes:      General:         Right eye: No discharge.         Left eye: No discharge.   Cardiovascular:      Rate and Rhythm: Normal rate and regular rhythm.   Pulmonary:      Effort: Pulmonary effort is normal. No respiratory distress.   Abdominal:      General: Bowel sounds are normal. There is distension.      Tenderness: There is no abdominal tenderness.   Neurological:      Mental Status: She is alert. Mental status is at baseline.   Psychiatric:         Mood and Affect: Mood normal.         Behavior: Behavior normal.           LABS: Laboratory results from 6/16/2020 is as follows white count was 9.1, hemoglobin 11.2, platelets 199,000.  Sodium was 142, potassium 4.0, CO2 is 29, anion gap was 7, BUN was 18, calcium was 8.6, creatinine 1.87, GFR was 25.  Creatinine had improved from previous on 12/5/2018 it was 2.13.      ASSESSMENT:      ICD-10-CM    1. Confusion  R41.0    2. Recurrent UTI  N39.0    3. Acute renal failure superimposed on chronic kidney disease, unspecified CKD stage, unspecified acute renal failure type (H)  N17.9     N18.9    4. Type 2 diabetes mellitus with complication, without long-term current use of insulin (H)  E11.8    5. Chronic Kidney Disease, Stage 4  N18.4    6. Bipolar affective disorder, remission status unspecified (H)  F31.9    7. Bilateral edema of lower extremity  R60.0        PLAN: Plan at this time continue to monitor above medical problems and no new changes.  Recommend on next visit that we do check a basic metabolic profile to see her kidney function is at this time.  Her bipolar affective disorder seems to be in remission at this time and she has no  issues and staff have no new concerns blood sugars do not look too bad at this time for what was available to me at this time and will continue to monitor.  I will continue to monitor above medical problems no other changes to care plan at this time.  Care plan was reviewed and is appropriate.      Total  minutes of which % was spent counseling and coordination of care of the above plan.    Electronically signed by: Sher Monaco,

## 2021-06-09 NOTE — PROGRESS NOTES
Code Status:  DNR/DNI  Visit Type: Review Of Multiple Medical Conditions     Facility:  [facility] [[id number]]         Facility Type:  (Long Term Care, LTC)    History of Present Illness: Roseanna Mcmaahn is a 85 y.o. female who I am seeing today for follow up of chronic medical conditions. Pt with history of diabetes type II, hypertension, bipolar depression, anemia of chronic disease, chronic kidney disease and LE edema.      Active Ambulatory Problems     Diagnosis Date Noted     Cerumen Impaction      (Lower) Leg Localized Swelling Bilateral      Bipolar Disorder      Cellulitis      Type 2 Diabetes Mellitus      Chronic Kidney Disease With Benign Hypertension      Osteoarthritis Of The Knee      Chronic Kidney Disease, Stage 4      Bipolar I disorder, most recent episode (or current) depressed, mild 08/11/2014     Adjustment disorder with mixed anxiety and depressed mood 08/11/2014     Bipolar I disorder, most recent episode (or current) depressed, mild 10/13/2014     Bipolar I disorder, most recent episode (or current) depressed, in partial or unspecified remission 04/13/2015     Intertrigo 12/14/2015     Resolved Ambulatory Problems     Diagnosis Date Noted     No Resolved Ambulatory Problems     No Additional Past Medical History     Meds reviewed at facility.     Current Outpatient Prescriptions   Medication Sig Note     acetaminophen 500 mg coapsule Take 500 mg by mouth every 4 (four) hours as needed for fever.       allopurinol (ZYLOPRIM) 100 MG tablet Take 100 mg by mouth daily.  5/23/2016: Received from: External Pharmacy     ARIPiprazole (ABILIFY) 5 MG tablet Take 1 tablet (5 mg total) by mouth every morning.      atorvastatin (LIPITOR) 10 MG tablet Take 10 mg by mouth bedtime.       blood sugar diagnostic Strp Use As Directed 4 (four) times a day. TEST 4 TIMES A DAY ON MON and THURS and PRN  Accu-chek Silvana strips      buPROPion (WELLBUTRIN XL) 150 MG 24 hr tablet Take 1 tablet (150  "mg total) by mouth every morning.      calcitriol (ROCALTROL) 0.25 MCG capsule Take 0.25 mcg by mouth every other day.      CERAMIDES 1,3,6-11 (CERAVE TOP) Apply 1 application topically daily as needed.       colchicine 0.6 mg tablet Take 0.6 mg by mouth daily.      ergocalciferol (VITAMIN D2) 50,000 unit capsule Take 50,000 Units by mouth once a week. On Wednesday      furosemide (LASIX) 20 MG tablet Take 10 mg by mouth every morning.       furosemide (LASIX) 20 MG tablet Take 20 mg by mouth as needed.      glucose 4 GM chewable tablet Chew 16 g daily as needed for low blood sugar.      insulin asp prt-insulin aspart (NOVOLOG MIX 70-30) 100 unit/mL (70-30) Soln Inject 5 Units under the skin 2 (two) times a day. 5 units in the morning and 5 units at supper      insulin syringe-needle U-100 (INSULIN SYRINGE) 1/2 mL 30 x 5/16\" Syrg Use 1 unit marking on U-100 syringe As Directed 4 (four) times a day.      LANTUS 100 unit/mL injection Inject 15 Units under the skin bedtime.  5/23/2016: Received from: External Pharmacy     lisinopril (PRINIVIL,ZESTRIL) 2.5 MG tablet Take 2.5 mg by mouth daily.      NOVOLOG 100 unit/mL injection  9/19/2016: Received from: External Pharmacy     nystatin (MYCOSTATIN) powder Apply 1 application topically 3 (three) times a day as needed.       triamcinolone (KENALOG) 0.1 % cream  9/19/2016: Received from: External Pharmacy         No Known Allergies    Review of Systems   No fevers or chills. No headache, lightheadedness or dizziness. No SOB, chest pains or palpitations. Appetite is good. No nausea, vomiting, constipation or diarrhea. No dysuria, frequency, burning or pain with urination. She denies any increased depressive symptoms.      Physical Exam  PHYSICAL EXAMINATION:  Vital signs:   Vitals:    03/06/17 1737   BP: 135/74   Pulse: 82   Resp: 16   Temp: 97  F (36.1  C)   SpO2: 95%       PHYSICAL EXAMINATION:  Vital signs:   Vitals:    03/06/17 1737   BP: 135/74   Pulse: 82   Resp: 16 "   Temp: 97  F (36.1  C)   SpO2: 95%     General: Awake, Alert, oriented x3, appropriately, follows simple commands, conversant. Pt sitting up in recliner.   HEENT:PERRLA, Pink conjunctiva, anicteric sclerae, moist oral mucosa  NECK: Supple, without any lymphadenopathy, or masses  CVS:  S1  S2, without murmur or gallop.   LUNG: Clear to auscultation, No wheezes, rales or rhonci.  BACK: No kyphosis of the thoracic spine  ABDOMEN: Soft, obese, nontender to palpation, with positive bowel sounds  EXTREMITIES: Good range of motion on both upper and lower extremities, DJD of mx joints no focal inflammation, No pedal edema, no calf tenderness. Ambulates with rolling walker independently.   SKIN: Warm and dry.   NEUROLOGIC: Intact, pulses palpable  PSYCHIATRIC: Cognition intact. Very pleasant and talkative.     Labs:    Lab Results   Component Value Date    WBC 8.9 01/12/2017    HGB 11.6 (L) 01/12/2017    HCT 36.6 01/12/2017    MCV 98 01/12/2017     01/12/2017     Results for orders placed or performed in visit on 12/19/16   Basic Metabolic Panel   Result Value Ref Range    Sodium 139 136 - 145 mmol/L    Potassium 4.8 3.5 - 5.0 mmol/L    Chloride 104 98 - 107 mmol/L    CO2 25 22 - 31 mmol/L    Anion Gap, Calculation 10 5 - 18 mmol/L    Glucose 165 (H) 70 - 125 mg/dL    Calcium 9.1 8.5 - 10.5 mg/dL    BUN 28 8 - 28 mg/dL    Creatinine 2.04 (H) 0.60 - 1.10 mg/dL    GFR MDRD Af Amer 28 (L) >60 mL/min/1.73m2    GFR MDRD Non Af Amer 23 (L) >60 mL/min/1.73m2         Assessment and Plan:  1. Bipolar disorder     2. Anemia     3. CKD (chronic kidney disease), stage 4 (severe)     4. Diabetes mellitus     5. Essential hypertension     6. Leg edema     7. Gout       Pt with bipolar disorder well controlled with Abilify and Wellbutrin. She is pleasant. Attends many activities around the unit. No increased depressive symptoms or evidence of laura. Anemia of chronic disease. Hemoglobin 11.6. CKD has remained stable with elevated  creatine of 2.04 and GFR or 21, somewhat improved from May. She continues on Lasix for chronic LE edema. I will follow up with BMP. Hx of gout. No active symptoms. Hypertension satisfactory controlled. DM satisfactory controlled.She continues on Lantus.  I will follow up with HgbA1C.     Electronically signed by: Kim Reynolds, CNP

## 2021-06-10 NOTE — PROGRESS NOTES
Correct pharmacy verified with patient and confirmed in snapshot? [x] yes []no    Medications Phoned  to Pharmacy [] yes [x]no  Name of Pharmacist:  List Medications, including dose, quantity and instructions      Medication Prescriptions given to patient   [] yes  [x] no   List the name of the drug the prescription was written for.      Medications ordered this visit were e-scribed.  Verified by order class [x] yes  [] no  Abilify, Wellbutrin    Medication changes or discontinuations were communicated to patient's pharmacy:  [] yes  [x] no  Pharmacist Spoke With:     UA collected [] yes  [x] no    [place] Prescription Monitoring Program Reviewed? [x] yes  [] no    Referrals/Labs were made to: None    Completed Charge Capture?  [x] yes  [] no    Future appointment was made: [x] yes  [] no  10/9/17  Dictation completed at time of chart check: [] yes  [x] no    I have checked the documentation for today s encounters and the above information has been reviewed and completed.

## 2021-06-10 NOTE — PROGRESS NOTES
Psychiatric  Progress Note    Date of visit: 4/10/2017           Discussion of Care and Treatment Recommendations:     This is a 86 y.o.  white female with bipolar disorder  She comes for this appointment accompanied by her daughter-in-law.  Patient , her daughter-in-law and I reviewed diagnosis and treatment plan and patient agrees with following recommendations:    1.Patient will take the medications as prescribed.   Psychiatric medications:  Abilify 5 mg every morning for mood stabilization.   Wellbutrin  mg every morning.  Patient will not stop taking medications or adjust them without consulting with the provider.  2.Patient will call with any problems between 2 visits.  3.Patient will go to the emergency room if not feeling safe , unable to function in the community, or if suicidal, homicidal or hearing voices or having paranoia.  4.Patient will abstain from drugs and alcohol.  Patient says she does not use any drugs or alcohol.  5.Patient will not drive if sedated on medications or under influence of any substance.  Patient says she does not drive.  6.Patient will not mix psychiatric medications with drugs and alcohol.   7.Patient will watch his diet and exercise.  8.Patient will see non psychiatric providers for non psychiatric disorders.  9. Next appointment in 6 months.         DIagnoses:     Adjustment disorder with depressed features  Bipolar disorder manic in remission      Diabetes mellitus, chronic knee pain, newly discovered kidney insufficiency    Patient Active Problem List   Diagnosis     Cerumen Impaction     (Lower) Leg Localized Swelling Bilateral     Bipolar Disorder     Cellulitis     Type 2 Diabetes Mellitus     Chronic Kidney Disease With Benign Hypertension     Osteoarthritis Of The Knee     Chronic Kidney Disease, Stage 4     Bipolar I disorder, most recent episode (or current) depressed, mild     Adjustment disorder with mixed anxiety and depressed mood     Bipolar I  disorder, most recent episode (or current) depressed, mild     Bipolar I disorder, most recent episode (or current) depressed, in partial or unspecified remission     Intertrigo             Chief Complaint / Subjective:    Chief complaint: loosing a friend in [facility],knee pain, irritated with not having hearing aid    History of Present Illness: Patient says she has knee pain. She is in a wheelchair.  Surgeon will not operate because she is too old.  She is a 86 years old.  She doesn't have hearing aid.  She is waiting for it.  There was some issue with the insurance.  She is talking really loudly because she cannot hear other people so she thinks they cannot hear her.She is also waiting for new denture because this one is falling out.  She says that she sleeps okay.  Appetite is good.  Energy is so-so.  Concentration is at baseline.  She says that she is not suicidal or homicidal.  She denies delusions and hallucinations.  She says that she likes to play piano for people in the assisted living.  She says it makes her happy and it makes other people happy.  She says that she has 4 children.  They're all involved in her care.  They visited her regularly.  She denies side effects of medications.  She takes them regularly.  The staff administer it to clients.  She looks a little disheveled today.  Her daughter-in-law says that the staff did not clean her.  Patient has to rely on them for hygiene.    She says she feels depressed because she lost a friend where she lives.  She also says another friend is getting sick and might die.  She is able to cope with depression.  We discussed medication changes but this time we believe that the roommate denies.  The daughter-in-law will call me if the depression gets worse.    Past psychiatric history:  Psychiatric hospitalizations: One time 13 years ago  Suicide attempt: No  ECT: No  Chemical dependency history: No    Family history:  Psychiatric: No  Suicide: No  Chemical  dependency:  no    Social history:  Patient was [place] farm.  She finished 10th grade.  She is .  Her  is in a nursing home.  She had 4 children.  3 are living now.  One  in .  She lives in a nursing home.  Her children are supportive of her.    Mental Status Examination:   General: adequate hygiene, cooperative   Speech: loud because she doesn't have hearing aids and she thinks that people cannot hear her, so she yells  Language: intact   Thought process: Cedar  Thought content: devoid of delusions and hallucinations   Suicidal thoughts: absent   Homicidal thoughts: absent   Associations: connected   Affect: Frustrated  Mood: Depressed and irritable  Intellectual functioning: within normal limits   Memory: within normal limits   Fund of knowledge: sufficient   Attention and concentration: Normal  Gait: Using a walker    Psychomotor activity: mild agitation  Muscles: No atrophy, no abnormal movements   InSight and judgment: Fair       Medication adherence: compliant  Medication side effects: absent  Information about medications: Side effects, benefits and alternative treatments discussed and patient and her daughter-in-law and they agree.    Psychotherapy: Supportive therapy regarding above issues    Education: Diet, exercise, abstinence from drugs and alcohol, patient will not drive if sedated and medications or  under influence of any substance    Lab Results: Personally reviewed  Lab Results   Component Value Date    WBC 8.9 2017    HGB 11.6 (L) 2017    HCT 36.6 2017     2017    CHOL 130 2015    TRIG 123 2015    HDL 49 2015    ALT 7 2015    AST 12 2015     2016    K 4.8 2016     2016    CREATININE 2.04 (H) 2016    BUN 28 2016    CO2 25 2016    TSH 1.54 2015    HGBA1C 6.1 2016    MICROALBUR 2.12 (H) 2014       Vital signs:  Vitals:    04/10/17  "1347   BP: 119/61   Pulse: 72   Resp: 16         Allergies: Review of patient's allergies indicates no known allergies.         Medications:       Current Outpatient Prescriptions   Medication Sig Note     acetaminophen 500 mg coapsule Take 500 mg by mouth every 4 (four) hours as needed for fever.       allopurinol (ZYLOPRIM) 100 MG tablet Take 100 mg by mouth daily.  5/23/2016: Received from: External Pharmacy     ARIPiprazole (ABILIFY) 5 MG tablet Take 1 tablet (5 mg total) by mouth every morning.      blood sugar diagnostic Strp Use As Directed 4 (four) times a day. TEST 4 TIMES A DAY ON MON and THURS and PRN  Accu-chek Silvana strips      buPROPion (WELLBUTRIN XL) 150 MG 24 hr tablet Take 1 tablet (150 mg total) by mouth every morning.      calcitriol (ROCALTROL) 0.25 MCG capsule Take 0.25 mcg by mouth every other day.      CERAMIDES 1,3,6-11 (CERAVE TOP) Apply 1 application topically daily as needed.       furosemide (LASIX) 20 MG tablet Take 10 mg by mouth every morning.       glucose 4 GM chewable tablet Chew 16 g daily as needed for low blood sugar.      insulin asp prt-insulin aspart (NOVOLOG MIX 70-30) 100 unit/mL (70-30) Soln Inject 5 Units under the skin 2 (two) times a day. 5 units in the morning and 5 units at supper      insulin syringe-needle U-100 (INSULIN SYRINGE) 1/2 mL 30 x 5/16\" Syrg Use 1 unit marking on U-100 syringe As Directed 4 (four) times a day.      LANTUS 100 unit/mL injection Inject 15 Units under the skin bedtime.  5/23/2016: Received from: External Pharmacy     NOVOLOG 100 unit/mL injection  9/19/2016: Received from: External Pharmacy     nystatin (MYCOSTATIN) powder Apply 1 application topically 3 (three) times a day as needed.       triamcinolone (KENALOG) 0.1 % cream  9/19/2016: Received from: External Pharmacy     atorvastatin (LIPITOR) 10 MG tablet Take 10 mg by mouth bedtime.              Review of Systems:    Chronic knee pain, otherwise reminder of review of 10 point systems is " negative    Coordination of Care:   More than 25 minutes spent on this visit  with more than 50% of time spent on coordination of care including: Reviewing the chart, coordinating care with the nurse, reviewing and fillling out  facility forms, psychoeducation, entering orders and preparing documentation for the visit, providing supportive therapy regarding grief to be leaving    This note is created with  the help of dictation system.  All grammatical and typing errors or context distortion are  unintentional and inherent to software.    Flor Barrios MD

## 2021-06-10 NOTE — PROGRESS NOTES
Code Status:  DNR/DNI  Visit Type: Problem Visit     Facility:  [facility] [[id number]]         Facility Type:  (Long Term Care, LTC)    History of Present Illness: Roseanna Mcmahan is a 86 y.o. female who I am seeing today at the request of the nursing staff. Pt with history of diabetes type II, hypertension, bipolar depression, anemia of chronic disease, chronic kidney disease and LE edema. Pt is having low blood sugars. She continues on insulin. She denies any changes in her diet.       Active Ambulatory Problems     Diagnosis Date Noted     Cerumen Impaction      (Lower) Leg Localized Swelling Bilateral      Bipolar Disorder      Cellulitis      Type 2 Diabetes Mellitus      Chronic Kidney Disease With Benign Hypertension      Osteoarthritis Of The Knee      Chronic Kidney Disease, Stage 4      Bipolar I disorder, most recent episode (or current) depressed, mild 08/11/2014     Adjustment disorder with mixed anxiety and depressed mood 08/11/2014     Bipolar I disorder, most recent episode (or current) depressed, mild 10/13/2014     Bipolar I disorder, most recent episode (or current) depressed, in partial or unspecified remission 04/13/2015     Intertrigo 12/14/2015     Resolved Ambulatory Problems     Diagnosis Date Noted     No Resolved Ambulatory Problems     No Additional Past Medical History     Meds reviewed at facility.     Current Outpatient Prescriptions   Medication Sig Note     acetaminophen 500 mg coapsule Take 500 mg by mouth every 4 (four) hours as needed for fever.       allopurinol (ZYLOPRIM) 100 MG tablet Take 100 mg by mouth daily.  5/23/2016: Received from: External Pharmacy     ARIPiprazole (ABILIFY) 5 MG tablet Take 1 tablet (5 mg total) by mouth every morning.      atorvastatin (LIPITOR) 10 MG tablet Take 10 mg by mouth bedtime.       blood sugar diagnostic Strp Use As Directed 4 (four) times a day. TEST 4 TIMES A DAY ON MON and THURS and PRN  Accu-chek Silvana strips       "buPROPion (WELLBUTRIN XL) 150 MG 24 hr tablet Take 1 tablet (150 mg total) by mouth every morning.      buPROPion (WELLBUTRIN XL) 150 MG 24 hr tablet Take 1 tablet (150 mg total) by mouth every morning.      calcitriol (ROCALTROL) 0.25 MCG capsule Take 0.25 mcg by mouth every other day.      CERAMIDES 1,3,6-11 (CERAVE TOP) Apply 1 application topically daily as needed.       furosemide (LASIX) 20 MG tablet Take 10 mg by mouth every morning.       glucose 4 GM chewable tablet Chew 16 g daily as needed for low blood sugar.      insulin asp prt-insulin aspart (NOVOLOG MIX 70-30) 100 unit/mL (70-30) Soln Inject 5 Units under the skin 2 (two) times a day. 5 units in the morning and 5 units at supper      insulin syringe-needle U-100 (INSULIN SYRINGE) 1/2 mL 30 x 5/16\" Syrg Use 1 unit marking on U-100 syringe As Directed 4 (four) times a day.      LANTUS 100 unit/mL injection Inject 15 Units under the skin bedtime.  5/23/2016: Received from: External Pharmacy     NOVOLOG 100 unit/mL injection  9/19/2016: Received from: External Pharmacy     nystatin (MYCOSTATIN) powder Apply 1 application topically 3 (three) times a day as needed.       triamcinolone (KENALOG) 0.1 % cream  9/19/2016: Received from: External Pharmacy         No Known Allergies    Review of Systems   No fevers or chills. No headache, lightheadedness or dizziness. No SOB, chest pains or palpitations. Appetite is good. No nausea, vomiting, constipation or diarrhea. No dysuria, frequency, burning or pain with urination. She denies any increased depressive symptoms.      Physical Exam  PHYSICAL EXAMINATION:  Vital signs:   Vitals:    05/01/17 1855   BP: 130/82   Pulse: 77   Resp: 22   Temp: 97.1  F (36.2  C)   SpO2: 98%       PHYSICAL EXAMINATION:  Vital signs:   Vitals:    05/01/17 1855   BP: 130/82   Pulse: 77   Resp: 22   Temp: 97.1  F (36.2  C)   SpO2: 98%     General: Awake, Alert, oriented x3, appropriately, follows simple commands, conversant. Pt sitting " up in recliner.   HEENT:PERRLA, Pink conjunctiva, anicteric sclerae, moist oral mucosa  NECK: Supple, without any lymphadenopathy, or masses  CVS:  S1  S2, without murmur or gallop.   LUNG: Clear to auscultation, No wheezes, rales or rhonci.  BACK: No kyphosis of the thoracic spine  ABDOMEN: Soft, obese, nontender to palpation, with positive bowel sounds  EXTREMITIES: Good range of motion on both upper and lower extremities, DJD of mx joints no focal inflammation,Trace pedal edema, no calf tenderness. Ambulates with rolling walker independently.   SKIN: Warm and dry.   NEUROLOGIC: Intact, pulses palpable  PSYCHIATRIC: Cognition intact. Very pleasant and talkative.     Labs:    Lab Results   Component Value Date    WBC 8.9 01/12/2017    HGB 11.6 (L) 01/12/2017    HCT 36.6 01/12/2017    MCV 98 01/12/2017     01/12/2017     Results for orders placed or performed in visit on 12/19/16   Basic Metabolic Panel   Result Value Ref Range    Sodium 139 136 - 145 mmol/L    Potassium 4.8 3.5 - 5.0 mmol/L    Chloride 104 98 - 107 mmol/L    CO2 25 22 - 31 mmol/L    Anion Gap, Calculation 10 5 - 18 mmol/L    Glucose 165 (H) 70 - 125 mg/dL    Calcium 9.1 8.5 - 10.5 mg/dL    BUN 28 8 - 28 mg/dL    Creatinine 2.04 (H) 0.60 - 1.10 mg/dL    GFR MDRD Af Amer 28 (L) >60 mL/min/1.73m2    GFR MDRD Non Af Amer 23 (L) >60 mL/min/1.73m2         Assessment and Plan:  1. Diabetes mellitus       Pt with mx cormorbidites. She has underlying DM type II. She continues on Lantus 14 units at HS and novolog 5 units BID. Am blood sugars in the low 80s. I will d/c her Novolog and see if her blood sugars regulate. HgbA1C 6.1.           Electronically signed by: Kim Reynolds, CNP

## 2021-06-10 NOTE — PROGRESS NOTES
[facility]    Facility:   [facility]  [[id number]]   Code Status: DNR/DNI    85-year-old woman has resided in long-term care for the past year.She had been residing in assisted living but was losing her ability to live independently. She has a past history of psychosis with bipolar disorder. No recent change in her psychiatric medications. Since admission to long-term care she has made an excellent adjustment, participates in activities, and has shown improvement in her medical morbidities.  She is being seen today to review multiple medical problems    1. Diabetes mellitus   well controlled on glargine 15 at at bedtime.  Most recent A1c 6.1 in December.  No reported hypoglycemic episodes.  No change in regimen.  Weight    2. Bipolar disorder  has been stable. Not followed by this clinician.  Has a psychiatrist that sees her.  Those records are not available and psychiatrist has been on willing to discuss her case.  She currently has been treated with Abilify 5 mg and bupropion 150 mg.  She is not excessively somnolent.  She participates in communal activities.  Her diabetes well controlled.  No need for further intervention at this time    3. Anemia, unspecified type   long-standing history.  Most recent hemoglobin in January 11 0.6.  Her hemoglobin has been less than 12 since September 2015.  Erlin has been 10.7 a year ago.  No intervention needed at this time.  Her complexion looks normal and she is to conjunctivae.     4. CKD (chronic kidney disease), stage 4 (severe)   long-standing history of renal impairment.  GFR has been less than 25 since June 2012.  Erlin was 16 in 2015.  Most recent GFR 23 December.  She is on minimal medications.  Blood pressures normal.  No other intervention needed    5. Essential hypertension with goal blood pressure less than 140/90   normal.  No medications with low-dose furosemide    6. Bilateral edema of lower extremity   on admission to  the C she had severe lymphedema of the lower extremities.  With compression and judicious use of diuretics this is essentially disappeared.  She is now being maintained on furosemide 10 mg daily.       Exam-vital signs reviewed over the past month.  Normal and stable.  Weight has been stable over the past year with minor up-and-down fluctuations.  5 pound weight loss since January.  General-patient anticipates my visits and is delighted to have conversation.  She is seen sitting in her chair.  She does not appear uncomfortable.  She is engaging in conversation.  There is been no behavioral disturbance.  HEENT-conjunctivae are normal in appearance.  No scleral icterus.  No facial asymmetry.  Moist mucous membranes.  Adequate dental hygiene.  Neck-thick.  Difficult to evaluate.  No palpable adenopathy or thyroid enlargement.  Lungs-clear bilaterally.  Heart-normal rate, irregular rhythm.  Abdomen-obese.  Her internal organs nonpalpable.  No tenderness palpation.  Normally active bowel sounds.  Skin-normal turgor.  Extremities-no distal edema.  4 feet are warm to touch.  Brisk capillary refill.  No skin breakdown.    Plan-continue current successful management.  Frequent cardiac ectopy noted on today's exam.  Will get EKG to document.  If abnormal cardiogram then potassium needs to be checked      Electronically signed by: Jairo Sifuentes MD

## 2021-06-11 NOTE — PROGRESS NOTES
Code Status:  DNR/DNI  Visit Type: Review Of Multiple Medical Conditions     Facility:  [facility] [[id number]]         Facility Type:  (Long Term Care, LTC)    History of Present Illness: Roseanna Mcmahan is a 86 y.o. female who I am seeing today at the request of the nursing staff. Pt with history of diabetes type II, hypertension, bipolar depression, anemia of chronic disease, chronic kidney disease and LE edema.  Today I find her sitting out in the sun.  She does like tan and feels the sun  helps her overall well-being.      Active Ambulatory Problems     Diagnosis Date Noted     Cerumen Impaction      (Lower) Leg Localized Swelling Bilateral      Bipolar Disorder      Cellulitis      Type 2 Diabetes Mellitus      Chronic Kidney Disease With Benign Hypertension      Osteoarthritis Of The Knee      Chronic Kidney Disease, Stage 4      Bipolar I disorder, most recent episode (or current) depressed, mild 08/11/2014     Adjustment disorder with mixed anxiety and depressed mood 08/11/2014     Bipolar I disorder, most recent episode (or current) depressed, mild 10/13/2014     Bipolar I disorder, most recent episode (or current) depressed, in partial or unspecified remission 04/13/2015     Intertrigo 12/14/2015     Resolved Ambulatory Problems     Diagnosis Date Noted     No Resolved Ambulatory Problems     No Additional Past Medical History     Meds reviewed at facility.     Current Outpatient Prescriptions   Medication Sig Note     acetaminophen 500 mg coapsule Take 500 mg by mouth every 4 (four) hours as needed for fever.       allopurinol (ZYLOPRIM) 100 MG tablet Take 100 mg by mouth daily.  5/23/2016: Received from: External Pharmacy     ARIPiprazole (ABILIFY) 5 MG tablet Take 1 tablet (5 mg total) by mouth every morning.      atorvastatin (LIPITOR) 10 MG tablet Take 10 mg by mouth bedtime.       blood sugar diagnostic Strp Use As Directed 4 (four) times a day. TEST 4 TIMES A DAY ON MON and THURS  "and PRN  Accu-chek Silvana strips      buPROPion (WELLBUTRIN XL) 150 MG 24 hr tablet Take 1 tablet (150 mg total) by mouth every morning.      buPROPion (WELLBUTRIN XL) 150 MG 24 hr tablet Take 1 tablet (150 mg total) by mouth every morning.      calcitriol (ROCALTROL) 0.25 MCG capsule Take 0.25 mcg by mouth every other day.      CERAMIDES 1,3,6-11 (CERAVE TOP) Apply 1 application topically daily as needed.       furosemide (LASIX) 20 MG tablet Take 10 mg by mouth every morning.       glucose 4 GM chewable tablet Chew 16 g daily as needed for low blood sugar.      insulin asp prt-insulin aspart (NOVOLOG MIX 70-30) 100 unit/mL (70-30) Soln Inject 5 Units under the skin 2 (two) times a day. 5 units in the morning and 5 units at supper      insulin syringe-needle U-100 (INSULIN SYRINGE) 1/2 mL 30 x 5/16\" Syrg Use 1 unit marking on U-100 syringe As Directed 4 (four) times a day.      LANTUS 100 unit/mL injection Inject 15 Units under the skin bedtime.  5/23/2016: Received from: External Pharmacy     NOVOLOG 100 unit/mL injection  9/19/2016: Received from: External Pharmacy     nystatin (MYCOSTATIN) powder Apply 1 application topically 3 (three) times a day as needed.       triamcinolone (KENALOG) 0.1 % cream  9/19/2016: Received from: External Pharmacy         No Known Allergies    Review of Systems   No fevers or chills. No headache, lightheadedness or dizziness. No SOB, chest pains or palpitations. Appetite is good. No nausea, vomiting, constipation or diarrhea. No dysuria, frequency, burning or pain with urination. She denies any increased depressive symptoms.      Physical Exam  PHYSICAL EXAMINATION:  Vital signs:   Vitals:    06/05/17 2129   BP: 154/81   Pulse: 87   Resp: 20   Temp: 97.3  F (36.3  C)   SpO2: 96%       PHYSICAL EXAMINATION:  Vital signs:   Vitals:    06/05/17 2129   BP: 154/81   Pulse: 87   Resp: 20   Temp: 97.3  F (36.3  C)   SpO2: 96%     General: Awake, Alert, oriented x3, appropriately, follows " simple commands, conversant. Pt sitting up in recliner.   HEENT:PERRLA, Pink conjunctiva, anicteric sclerae, moist oral mucosa  NECK: Supple, without any lymphadenopathy, or masses  CVS:  S1  S2, without murmur or gallop.   LUNG: Clear to auscultation, No wheezes, rales or rhonci.  BACK: No kyphosis of the thoracic spine  ABDOMEN: Soft, obese, nontender to palpation, with positive bowel sounds  EXTREMITIES: Good range of motion on both upper and lower extremities, DJD of mx joints no focal inflammation,Trace pedal edema, no calf tenderness. Ambulates with rolling walker independently.   SKIN: Warm and dry.  Tan colored  NEUROLOGIC: Intact, pulses palpable  PSYCHIATRIC: Cognition intact. Very pleasant and talkative.     Labs:    Lab Results   Component Value Date    WBC 8.9 01/12/2017    HGB 11.6 (L) 01/12/2017    HCT 36.6 01/12/2017    MCV 98 01/12/2017     01/12/2017     Results for orders placed or performed in visit on 12/19/16   Basic Metabolic Panel   Result Value Ref Range    Sodium 139 136 - 145 mmol/L    Potassium 4.8 3.5 - 5.0 mmol/L    Chloride 104 98 - 107 mmol/L    CO2 25 22 - 31 mmol/L    Anion Gap, Calculation 10 5 - 18 mmol/L    Glucose 165 (H) 70 - 125 mg/dL    Calcium 9.1 8.5 - 10.5 mg/dL    BUN 28 8 - 28 mg/dL    Creatinine 2.04 (H) 0.60 - 1.10 mg/dL    GFR MDRD Af Amer 28 (L) >60 mL/min/1.73m2    GFR MDRD Non Af Amer 23 (L) >60 mL/min/1.73m2         Assessment and Plan:  1. Diabetes mellitus     2. Bipolar disorder     3. Anemia     4. Essential hypertension     5. Bilateral edema of lower extremity     6. CKD (chronic kidney disease), stage 4 (severe)     7. Gout       Patient with diabetes.  On last visit I discontinued her NovoLog.  Hemoglobin A1c 6.1.  Blood sugars are more satisfactory.  Less low blood sugars.  She continues on Lantus.  Bipolar disorder well-controlled with Abilify and Wellbutrin.  She likes to sit out in the sun and feels this helps her overall well-being.  Previous  anemia.  Hemoglobin 11.6.  Bilateral lower extremity edema chronic.  She does continue on 10 mg of Lasix every other day and I will trace on today's visit.  Chronic kidney disease stable.  Gout.  She does continue on allopurinol.  I will follow-up with uric acid level.  Hypertension.  Blood pressure suspect controlled.      Electronically signed by: Kim Reynolds, CNP

## 2021-06-11 NOTE — TELEPHONE ENCOUNTER
This patient's medication list and chart were reviewed as part of the service provided by [facility].    Assessment/Recommendations:  No recommendations for changes at this time.  I do not have access to her blood sugars.  Goal <8-8.5%, avoiding signs and symptoms of hypo or hyperglycemia, reasonable in this elderly patient with multiple co-morbidities, limited life expectancy, and limited function.    Jolie Patel, Pharm.D.,[facility] [place]  Board Certified Geriatric Pharmacist  Medication Therapy Management Pharmacist  [phone]

## 2021-06-11 NOTE — PROGRESS NOTES
Code Status:  DNR/DNI  Visit Type: Review Of Multiple Medical Conditions     Facility:  [facility] [[id number]]         Facility Type:  (Long Term Care, LTC)    History of Present Illness: Roseanna Mcmahan is a 86 y.o. female who I am seeing today for follow up of chronic medical conditions including diabetes type II, hypertension, bipolar depression, anemia of chronic disease, chronic kidney disease and LE edema.   She does spend time sitting in the sun.  She does have some sun spots on her arms which she picks at.  She does have topical treatment as needed.  Overall patient has been stable.    Active Ambulatory Problems     Diagnosis Date Noted     Cerumen Impaction      (Lower) Leg Localized Swelling Bilateral      Bipolar Disorder      Cellulitis      Type 2 Diabetes Mellitus      Chronic Kidney Disease With Benign Hypertension      Osteoarthritis Of The Knee      Chronic Kidney Disease, Stage 4      Bipolar I disorder, most recent episode (or current) depressed, mild 08/11/2014     Adjustment disorder with mixed anxiety and depressed mood 08/11/2014     Bipolar I disorder, most recent episode (or current) depressed, mild 10/13/2014     Bipolar I disorder, most recent episode (or current) depressed, in partial or unspecified remission 04/13/2015     Intertrigo 12/14/2015     Resolved Ambulatory Problems     Diagnosis Date Noted     No Resolved Ambulatory Problems     No Additional Past Medical History     Meds reviewed at facility.     Current Outpatient Prescriptions   Medication Sig Note     acetaminophen 500 mg coapsule Take 500 mg by mouth every 4 (four) hours as needed for fever.       allopurinol (ZYLOPRIM) 100 MG tablet Take 100 mg by mouth daily.  5/23/2016: Received from: External Pharmacy     ARIPiprazole (ABILIFY) 5 MG tablet Take 1 tablet (5 mg total) by mouth every morning.      atorvastatin (LIPITOR) 10 MG tablet Take 10 mg by mouth bedtime.       blood sugar diagnostic Strp Use  "As Directed 4 (four) times a day. TEST 4 TIMES A DAY ON MON and THURS and PRN  Accu-chek Silvana strips      buPROPion (WELLBUTRIN XL) 150 MG 24 hr tablet Take 1 tablet (150 mg total) by mouth every morning.      buPROPion (WELLBUTRIN XL) 150 MG 24 hr tablet Take 1 tablet (150 mg total) by mouth every morning.      calcitriol (ROCALTROL) 0.25 MCG capsule Take 0.25 mcg by mouth every other day.      CERAMIDES 1,3,6-11 (CERAVE TOP) Apply 1 application topically daily as needed.       furosemide (LASIX) 20 MG tablet Take 10 mg by mouth every morning.       glucose 4 GM chewable tablet Chew 16 g daily as needed for low blood sugar.      insulin asp prt-insulin aspart (NOVOLOG MIX 70-30) 100 unit/mL (70-30) Soln Inject 5 Units under the skin 2 (two) times a day. 5 units in the morning and 5 units at supper      insulin syringe-needle U-100 (INSULIN SYRINGE) 1/2 mL 30 x 5/16\" Syrg Use 1 unit marking on U-100 syringe As Directed 4 (four) times a day.      LANTUS 100 unit/mL injection Inject 15 Units under the skin bedtime.  5/23/2016: Received from: External Pharmacy     NOVOLOG 100 unit/mL injection  9/19/2016: Received from: External Pharmacy     nystatin (MYCOSTATIN) powder Apply 1 application topically 3 (three) times a day as needed.       triamcinolone (KENALOG) 0.1 % cream  9/19/2016: Received from: External Pharmacy         No Known Allergies    Review of Systems   No fevers or chills. No headache, lightheadedness or dizziness. No SOB, chest pains or palpitations. Appetite is good. No nausea, vomiting, constipation or diarrhea. No dysuria, frequency, burning or pain with urination. She denies any increased depressive symptoms.      Physical Exam  PHYSICAL EXAMINATION:  Vital signs:   Vitals:    07/11/17 2104   BP: 110/71   Pulse: 93   Resp: 18   Temp: 98.8  F (37.1  C)   SpO2: 93%       PHYSICAL EXAMINATION:  Vital signs:   Vitals:    07/11/17 2104   BP: 110/71   Pulse: 93   Resp: 18   Temp: 98.8  F (37.1  C)   SpO2: " 93%     General: Awake, Alert, oriented x3, appropriately, follows simple commands, conversant. Pt sitting up in recliner.   HEENT:PERRLA, Pink conjunctiva, anicteric sclerae, moist oral mucosa  NECK: Supple, without any lymphadenopathy, or masses  CVS:  S1  S2, without murmur or gallop.   LUNG: Clear to auscultation, No wheezes, rales or rhonci.  BACK: No kyphosis of the thoracic spine  ABDOMEN: Soft, obese, nontender to palpation, with positive bowel sounds  EXTREMITIES: Good range of motion on both upper and lower extremities, DJD of mx joints no focal inflammation,Trace pedal edema, no calf tenderness. Ambulates with rolling walker independently.  Sunspots to the tops of her arms.  She is very bronzed from sitting out in the sun.  She has picked at 2 areas tops of her arms.  No redness.  SKIN: Warm and dry.  Tan colored  NEUROLOGIC: Intact, pulses palpable  PSYCHIATRIC: Cognition intact. Very pleasant and talkative.     Labs:    Lab Results   Component Value Date    WBC 8.9 01/12/2017    HGB 11.6 (L) 01/12/2017    HCT 36.6 01/12/2017    MCV 98 01/12/2017     01/12/2017     Results for orders placed or performed in visit on 12/19/16   Basic Metabolic Panel   Result Value Ref Range    Sodium 139 136 - 145 mmol/L    Potassium 4.8 3.5 - 5.0 mmol/L    Chloride 104 98 - 107 mmol/L    CO2 25 22 - 31 mmol/L    Anion Gap, Calculation 10 5 - 18 mmol/L    Glucose 165 (H) 70 - 125 mg/dL    Calcium 9.1 8.5 - 10.5 mg/dL    BUN 28 8 - 28 mg/dL    Creatinine 2.04 (H) 0.60 - 1.10 mg/dL    GFR MDRD Af Amer 28 (L) >60 mL/min/1.73m2    GFR MDRD Non Af Amer 23 (L) >60 mL/min/1.73m2         Assessment and Plan:  1. Essential hypertension     2. Bilateral edema of lower extremity     3. Diabetes mellitus     4. Bipolar disorder     5. Gout     6. CKD (chronic kidney disease), stage 4 (severe)     7. Hypertension     8. Anemia     9. Actinic keratosis       Blood pressures well controlled.  She is only on Lasix.  Lower extremity  edema well-controlled only trace on today's visit.  Diabetes.  Suspect controlled.  She was continued on Lantus.  History of gout.  Continues on allopurinol for maintenance.  Chronic kidney disease.  Stable.  Bipolar disorder.  Symptoms well controlled with Wellbutrin.  Actinic keratotic lesions on the tops of the arms.  She does spend a lot of time in the sun.  Her skin is very bronzed.  We did speak at sunscreen however she does not use this.History of anemia.  Resolved.  Hemoglobin 11.9.I will follow-up with yearly laboratory including BMP CBC and hemoglobin A1c.          Electronically signed by: Kim Reynolds, CNP

## 2021-06-11 NOTE — PROGRESS NOTES
Code Status:  DNR/DNI  Visit Type: FVP Care Coordination - Regulatory     Facility:  [facility] [[id number]]         Facility Type:  (Long Term Care, LTC)    History of Present Illness: Roseanna Mcmahan is a 89 y.o. female who I am seeing today for regulatory visit.  Past medical history includes diabetes type II, hypertension, bipolar depression, anemia of chronic disease, chronic kidney disease and LE edema. DJD of the knees. Today pt sitting up in bedside chair. She continues with decline in cognition. She spends more time in her room. She is able to ambulate with a rolling walker. DM type II. Blood sugars well controlled with Lantus. Bipolar disorder. Symptoms well controlled with Abilify and Wellbutrin. Gout on Allopurinol. No recent flares. Pt with underlying CKD, Creatine 1.87 at baseline. She was treated for UTI back in June.         Active Ambulatory Problems     Diagnosis Date Noted     Cerumen Impaction      (Lower) Leg Localized Swelling Bilateral      Bipolar Disorder      Cellulitis      Type 2 Diabetes Mellitus      Chronic Kidney Disease With Benign Hypertension      Osteoarthritis Of The Knee      Chronic Kidney Disease, Stage 4      Bipolar I disorder, most recent episode (or current) depressed, mild 08/11/2014     Adjustment disorder with mixed anxiety and depressed mood 08/11/2014     Bipolar I disorder, most recent episode (or current) depressed, mild 10/13/2014     Bipolar I disorder, most recent episode (or current) depressed, in partial or unspecified remission 04/13/2015     Intertrigo 12/14/2015     Resolved Ambulatory Problems     Diagnosis Date Noted     No Resolved Ambulatory Problems     No Additional Past Medical History     Current Outpatient Medications   Medication Sig     acetaminophen (TYLENOL) 500 MG tablet Take 500 mg by mouth every 6 (six) hours as needed for pain.            allopurinol (ZYLOPRIM) 100 MG tablet Take 100 mg by mouth daily.      ARIPiprazole  (ABILIFY) 10 MG tablet Take 1 tablet (10 mg total) by mouth daily.     buPROPion (WELLBUTRIN XL) 150 MG 24 hr tablet Take 1 tablet (150 mg total) by mouth every morning.     glucose 4 GM chewable tablet Chew 16 g daily as needed for low blood sugar.     insulin detemir U-100 (LEVEMIR) 100 unit/mL injection Inject 18 Units under the skin at bedtime.     latanoprost (XALATAN) 0.005 % ophthalmic solution Administer 1 drop to both eyes at bedtime.     nystatin (MYCOSTATIN) powder Apply 1 application topically 3 (three) times a day as needed.      triamcinolone (KENALOG) 0.1 % lotion Apply 1 application topically 2 (two) times a day as needed. Apply small amount to upper arms and back as needed.           No Known Allergies    Review of Systems   Somewhat of poor historian.  No fever or chills. No headache, lightheadedness or dizziness.  Patient denies SOB, no chest pains or palpitations. No nausea, vomiting, constipation or diarrhea.  Patient denies any dysuria, frequency, burning or pain with urination. Otherwise review of systems are negative.         PHYSICAL EXAMINATION:  General: Patient sitting up in bedside chair.   VS: /85, pulse 96, respirations 20, temperature 98.5, O2 sat 99% RA.  Weight 174.3 pounds.  HEENT:moist oral mucosa.  Severe hard of hearing.  No aids.  She does wear dentures which are loose in her mouth.  Facial symmetry.  Tongue is midline.  NECK: Supple  CARDIOVASCULAR: S1, S2 without murmur or gallop.   RESPIRATORY: No wheezing, rales or rhonchi.   ABDOMEN: Obese.  Soft nondistended with positive bowel sounds x4.  EXTREMITIES: Movesboth upper and lower extremities with generalized weakness, trace pedal edema, no calf tenderness.   equal.  Patient able to overcome gravity.  NEUROLOGIC: Intact  PSYCHIATRIC: Advanced cognitive impairment with decline.      Assessment and Plan:  1. Type 2 diabetes mellitus with complication, without long-term current use of insulin (H)   blood pressure  satisfactory controlled on Lantus.   2. Chronic Kidney Disease, Stage 4   creatinine at baseline 1.8.  Follow-up CBC and BMP.   3. Bipolar affective disorder, remission status unspecified (H)   symptoms well controlled with Abilify and Wellbutrin.  She does continue to decline cognitively.   4. Recurrent UTI   no current symptoms.  Last treated back in June.   5.   Gout  continues on allopurinol.     Case Management:  I have reviewed the facility/SNF care plan/MDS which was done 8/20/2020, including the falls risk, nutrition and pain screening. I also reviewed the current immunizations, and preventive care.. Future cancer screening is not clinically indicated secondary to age/goals of care.   Patient's desire to return to the community is not assessible due to cognitive impairment.      Electronically signed by: Kim Reynolds CNP

## 2021-06-12 NOTE — PROGRESS NOTES
[facility]    Facility:   [facility] [[id number]]   Code Status: DNR/DNI     85-year-old woman has resided in long-term care for the past year.She had been residing in assisted living but was losing her ability to live independently. She has a past history of psychosis with bipolar disorder. No recent change in her psychiatric medications. Since admission to long-term care she has made an excellent adjustment, participates in activities, and has shown improvement in her medical morbidities.  She is being seen today to review multiple medical problems    1. Essential hypertension   currently on no medications other than every other day furosemide for edema.  Blood pressures normal.  Problem appears to be closed at this time   2. Bilateral edema of lower extremity   no current edema.  Skin is intact and well cared for.   3. Diabetes mellitus   A1c 7.0 in July.  Regimen is Lantus 18 units at bedtime.  Blood sugars are well controlled.  Will reduce blood sugar testing to twice daily once monthly.   4. CKD (chronic kidney disease), stage 4 (severe)   GFR has been stable at 23 over the past year.  Patient is on minimal medications.   5. Anemia, unspecified type   hemoglobin slowly improving.  It has increased 1 g over  past 3 months and is now 11.6.   6. Bipolar disorder   lifelong history.  Maintained on Abilify 5 mg and bupropion  mg.  Followed by her psychiatrist twice a year for medication, management.  Attempts in the past to discontinue Abilify and Propine on have met with worsening behaviors and mood.  She demonstrates no parkinsonian features.  There is no sleep disruption.  Bowels are moving well.  No tremor or drooling.  Gait is not impaired.  In view of the absence of side effects, deterioration of her behavior and mood in attempting to reduce medication, and improved mood it does not appear appropriate to make further attempts at reducing medication at this time         Exam-vital signs reviewed over the past month.  Normal and stable.  Stable weight.  Bowels moving regularly.  General-patient is seen sitting in her recliner napping.  Easily aroused.  She greets me warmly.  HEENT- conjunctivae dark.  Sclera white.  PERRLA.  Moist mucous membranes.  Speech clearly articulated.  Hearing aids in place.  Neck-no venous distention, thyroid enlargement or lymphadenopathy.  Lungs-clear bilaterally.  Heart-normal rate regular rhythm.  Abdomen- obese.  No tenderness palpation.  Regular bowel sounds.  Skin-normal turgor for age.  Extremities-skin of the feet is intact and well cared for.  No intertrigo.  No edema.  Pedal pulses are 1+ and symmetric.    Plan- decrease blood sugar testing to twice daily once a month.  Change frequent weights to once monthly.      Electronically signed by: Jairo Sifuentes MD

## 2021-06-12 NOTE — PROGRESS NOTES
[facility]    Facility:   [facility]  [[id number]]   Code Status: DNR/DNI      CHIEF COMPLAINT/REASON FOR VISIT:  Chief Complaint   Patient presents with     Review Of Multiple Medical Conditions     2 diabetes, bipolar depression, anemia of chronic disease, bilateral lower extremity edema, history of cellulitis, chronic kidney disease stage IV has not changed too much by my review of the last labs,.       HISTORY:      HPI: Roseanna is a 89 y.o. female term care for multiple medical problems.  She is a type 2 diabetes hypertension bipolar depression.  She has been in remission from her bipolar depression is been no real signs of any kinds of cycling at this time.  Her blood pressure is under better control at this time at 117/74 and your blood sugars have been okay although they are not checking them too much at this time.  I do not have an A1c of past but I think I would recommend that on the next lab day as well as a basic metabolic profile.    Staff have no new concerns and patient is lying in bed she says she feels fine with no new concerns.    Past Medical History:   Diagnosis Date     (Lower) Leg Localized Swelling Bilateral     Created by Conversion      Adjustment disorder with mixed anxiety and depressed mood 8/11/2014     Bipolar Disorder     Created by Conversion  Replacement Utility updated for latest IMO load     Bipolar I disorder, most recent episode (or current) depressed, in partial or unspecified remission 4/13/2015     Bipolar I disorder, most recent episode (or current) depressed, mild 8/11/2014     Replacement Utility updated for latest IMO load     Cellulitis     Created by Conversion      Cerumen Impaction     Created by Conversion      Chronic Kidney Disease With Benign Hypertension     Created by Conversion      Chronic Kidney Disease, Stage 4     Created by Conversion      Intertrigo 12/14/2015     Osteoarthritis Of The Knee     Created by Conversion   Replacement Utility updated for latest IMO load     Type 2 Diabetes Mellitus     Created by Conversion              Family History   Problem Relation Age of Onset     Diabetes Sister      Cancer Sister      Diabetes Brother      Cancer Brother      Diabetes Son      Diabetes Sister      Cancer Son         colon cancer     Social History     Socioeconomic History     Marital status:      Spouse name: None     Number of children: None     Years of education: None     Highest education level: None   Occupational History     None   Social Needs     Financial resource strain: None     Food insecurity     Worry: None     Inability: None     Transportation needs     Medical: None     Non-medical: None   Tobacco Use     Smoking status: Former Smoker     Types: Cigarettes     Start date: 1949     Quit date: 1990     Years since quittin.4     Smokeless tobacco: Never Used   Substance and Sexual Activity     Alcohol use: No     Frequency: Never     Comment: rare     Drug use: No     Sexual activity: Never   Lifestyle     Physical activity     Days per week: None     Minutes per session: None     Stress: None   Relationships     Social connections     Talks on phone: None     Gets together: None     Attends Denominational service: None     Active member of club or organization: None     Attends meetings of clubs or organizations: None     Relationship status: None     Intimate partner violence     Fear of current or ex partner: None     Emotionally abused: None     Physically abused: None     Forced sexual activity: None   Other Topics Concern     None   Social History Narrative     None         Review of Systems   Constitutional:        Patient denies any pain fevers chills nausea vomit diarrhea change in vision hearing taste or smell weakness one-sided of the chest pain shortness of breath.  She denies any current shortness stool polyphagia polydipsia polyuria depression or anxiety and she was swabbed for  Covid today on routine testing.       Vitals:    11/04/20 1030   BP: 117/74   Pulse: 99   Resp: 14   Temp: 98.2  F (36.8  C)   SpO2: 97%       Physical Exam  Constitutional:       General: She is not in acute distress.  HENT:      Head: Normocephalic and atraumatic.      Nose: Nose normal.      Mouth/Throat:      Mouth: Mucous membranes are moist.      Pharynx: Oropharynx is clear.   Cardiovascular:      Rate and Rhythm: Normal rate and regular rhythm.   Pulmonary:      Effort: No respiratory distress.      Breath sounds: Normal breath sounds.   Neurological:      Mental Status: She is alert.           LABS: Laboratory values are as follows 6/16/2020 sodium was 142, potassium 4.0, CO2 is 29, calcium is 8.6, BUN was 18, creatinine 1.87, GFR was 25 which is I look back in the chart this has not changed.  White count was 9.1, hemoglobin was 11.2, platelets 199,000.  Blood sugars running .      ASSESSMENT:      ICD-10-CM    1. Type 2 diabetes mellitus with complication, without long-term current use of insulin (H)  E11.8    2. Morbid obesity (H)  E66.01    3. Confusion  R41.0    4. Acute renal failure superimposed on chronic kidney disease, unspecified CKD stage, unspecified acute renal failure type (H)  N17.9     N18.9    5. Chronic Kidney Disease, Stage 4  N18.4    6. Bilateral edema of lower extremity  R60.0    7. Bipolar affective disorder, remission status unspecified (H)  F31.9        PLAN: Plan at this time we will continue to monitor above medical problems no new changes.  She will likely need another basic metabolic profile on next visit to be ordered secondary to her creatinine 1.87 with a GFR of 25.  This has not changed too much from previous and she does have chronic kidney disease.  She is chronic kidney disease stage IV be consistent.  We will continue to check her blood sugars at this time and no other changes to care plan at this time.      Total  minutes of which % was spent counseling and  coordination of care of the above plan.    Electronically signed by: Sher Monaco DO

## 2021-06-13 NOTE — PROGRESS NOTES
Reason for visit - follow-up.  Doing well and new hearing aids.  Sleep - good  Depression - none  Anxiety - no  Panic attacks - no  SI/HI - no  Therapist - no  Side effects from medication - no    No medications to report on the [place] .

## 2021-06-13 NOTE — PROGRESS NOTES
Code Status:  DNR/DNI  Visit Type: Review Of Multiple Medical Conditions     Facility:  [facility] [[id number]]         Facility Type:  (Long Term Care, LTC)    History of Present Illness: Roseanna Mcmahan is a 86 y.o. female who I am seeing today for follow up of chronic medical conditions including diabetes type II, hypertension, bipolar depression, anemia of chronic disease, chronic kidney disease and LE edema. DJD of the knees. Today pt continues to complain of pain in both her knees. She continues to ambulate independently with rolling walker. No focal inflammation. She has been using tylenol prn quite frequently.     Active Ambulatory Problems     Diagnosis Date Noted     Cerumen Impaction      (Lower) Leg Localized Swelling Bilateral      Bipolar Disorder      Cellulitis      Type 2 Diabetes Mellitus      Chronic Kidney Disease With Benign Hypertension      Osteoarthritis Of The Knee      Chronic Kidney Disease, Stage 4      Bipolar I disorder, most recent episode (or current) depressed, mild 08/11/2014     Adjustment disorder with mixed anxiety and depressed mood 08/11/2014     Bipolar I disorder, most recent episode (or current) depressed, mild 10/13/2014     Bipolar I disorder, most recent episode (or current) depressed, in partial or unspecified remission 04/13/2015     Intertrigo 12/14/2015     Resolved Ambulatory Problems     Diagnosis Date Noted     No Resolved Ambulatory Problems     No Additional Past Medical History     Meds reviewed at facility.       No Known Allergies    Review of Systems   No fevers or chills. No headache, lightheadedness or dizziness. No SOB, chest pains or palpitations. Appetite is good. No nausea, vomiting, constipation or diarrhea. No dysuria, frequency, burning or pain with urination. She denies any increased depressive symptoms. Continued pain in bilateral knees.       Physical Exam  PHYSICAL EXAMINATION:  Vital signs:   Vitals:    10/29/17 1717   BP:  109/67   Pulse: (!) 102   Resp: 20   Temp: 97.1  F (36.2  C)   SpO2: 100%       PHYSICAL EXAMINATION:  Vital signs:   Vitals:    10/29/17 1717   BP: 109/67   Pulse: (!) 102   Resp: 20   Temp: 97.1  F (36.2  C)   SpO2: 100%     General: Awake, Alert, oriented x3, appropriately, follows simple commands, conversant. Pt sitting up in recliner.   HEENT:PERRLA, Pink conjunctiva, anicteric sclerae, moist oral mucosa  NECK: Supple, without any lymphadenopathy, or masses  CVS:  S1  S2, without murmur or gallop.   LUNG: Clear to auscultation, No wheezes, rales or rhonci.  BACK: No kyphosis of the thoracic spine  ABDOMEN: Soft, obese, nontender to palpation, with positive bowel sounds  EXTREMITIES: Good range of motion on both upper and lower extremities, DJD of mx joints no focal inflammation,Trace pedal edema, no calf tenderness. Ambulates with rolling walker independently.  Sunspots to the tops of her arms.    SKIN: Warm and dry.   NEUROLOGIC: Intact, pulses palpable  PSYCHIATRIC: Cognition intact. Very pleasant and talkative.     Labs:    Lab Results   Component Value Date    WBC 9.1 07/31/2017    HGB 11.6 (L) 07/31/2017    HCT 37.0 07/31/2017     07/31/2017     07/31/2017     Results for orders placed or performed in visit on 07/31/17   Basic Metabolic Panel   Result Value Ref Range    Sodium 144 136 - 145 mmol/L    Potassium 4.6 3.5 - 5.0 mmol/L    Chloride 106 98 - 107 mmol/L    CO2 30 22 - 31 mmol/L    Anion Gap, Calculation 8 5 - 18 mmol/L    Glucose 133 (H) 70 - 125 mg/dL    Calcium 9.2 8.5 - 10.5 mg/dL    BUN 38 (H) 8 - 28 mg/dL    Creatinine 2.04 (H) 0.60 - 1.10 mg/dL    GFR MDRD Af Amer 28 (L) >60 mL/min/1.73m2    GFR MDRD Non Af Amer 23 (L) >60 mL/min/1.73m2         Assessment and Plan:  1. DJD (degenerative joint disease)     2. Knee pain     3. Diabetes mellitus     4. Bipolar disorder     5. Gout     6. Hypertension     7. Bilateral edema of lower extremity     8. Chronic Kidney Disease, Stage 4        DJD of mx joints with continued pain in both knees. No focal inflammation, redness or warmth. Chronic LE edema however only trace on today's visit. She continues to ambulate with rolling walker independently. I will schedule tylenol 1000mg BID. DM well controlled with Lantus. Recent HgbA1C was 7.0. Hypertension blood pressures satisfactory controlled.  Gout hx continues on Allopurinol. Bipolar disorder well controlled with Abilify and Wellbutrin.       Electronically signed by: Kim Reynolds, JUANI

## 2021-06-13 NOTE — PROGRESS NOTES
Code Status:  DNR/DNI  Visit Type: Review Of Multiple Medical Conditions     Facility:  [facility] [[id number]]         Facility Type:  (Long Term Care, LTC)    History of Present Illness: Roseanna Mcmahan is a 86 y.o. female who I am seeing today for follow up of chronic medical conditions including diabetes type II, hypertension, bipolar depression, anemia of chronic disease, chronic kidney disease and LE edema. DJD of the knees. Some increased complaints of bilateral knee pain. She ambulates with rolling walker. She has been taking tylenol prn. No focal inflammation.     Active Ambulatory Problems     Diagnosis Date Noted     Cerumen Impaction      (Lower) Leg Localized Swelling Bilateral      Bipolar Disorder      Cellulitis      Type 2 Diabetes Mellitus      Chronic Kidney Disease With Benign Hypertension      Osteoarthritis Of The Knee      Chronic Kidney Disease, Stage 4      Bipolar I disorder, most recent episode (or current) depressed, mild 08/11/2014     Adjustment disorder with mixed anxiety and depressed mood 08/11/2014     Bipolar I disorder, most recent episode (or current) depressed, mild 10/13/2014     Bipolar I disorder, most recent episode (or current) depressed, in partial or unspecified remission 04/13/2015     Intertrigo 12/14/2015     Resolved Ambulatory Problems     Diagnosis Date Noted     No Resolved Ambulatory Problems     No Additional Past Medical History     Meds reviewed at facility.       No Known Allergies    Review of Systems   No fevers or chills. No headache, lightheadedness or dizziness. No SOB, chest pains or palpitations. Appetite is good. No nausea, vomiting, constipation or diarrhea. No dysuria, frequency, burning or pain with urination. She denies any increased depressive symptoms. Reports pain in both knees. Reports relief with prn tylenol.       Physical Exam  PHYSICAL EXAMINATION:  Vital signs:   Vitals:    09/25/17 1955   BP: 122/78   Pulse: 71   Resp:  22   Temp: 97.2  F (36.2  C)   SpO2: 97%       PHYSICAL EXAMINATION:  Vital signs:   Vitals:    09/25/17 1955   BP: 122/78   Pulse: 71   Resp: 22   Temp: 97.2  F (36.2  C)   SpO2: 97%     General: Awake, Alert, oriented x3, appropriately, follows simple commands, conversant. Pt sitting up in recliner.   HEENT:PERRLA, Pink conjunctiva, anicteric sclerae, moist oral mucosa  NECK: Supple, without any lymphadenopathy, or masses  CVS:  S1  S2, without murmur or gallop.   LUNG: Clear to auscultation, No wheezes, rales or rhonci.  BACK: No kyphosis of the thoracic spine  ABDOMEN: Soft, obese, nontender to palpation, with positive bowel sounds  EXTREMITIES: Good range of motion on both upper and lower extremities, DJD of mx joints no focal inflammation,Trace pedal edema, no calf tenderness. Ambulates with rolling walker independently.  Sunspots to the tops of her arms.    SKIN: Warm and dry.  Tan colored  NEUROLOGIC: Intact, pulses palpable  PSYCHIATRIC: Cognition intact. Very pleasant and talkative.     Labs:    Lab Results   Component Value Date    WBC 9.1 07/31/2017    HGB 11.6 (L) 07/31/2017    HCT 37.0 07/31/2017     07/31/2017     07/31/2017     Results for orders placed or performed in visit on 07/31/17   Basic Metabolic Panel   Result Value Ref Range    Sodium 144 136 - 145 mmol/L    Potassium 4.6 3.5 - 5.0 mmol/L    Chloride 106 98 - 107 mmol/L    CO2 30 22 - 31 mmol/L    Anion Gap, Calculation 8 5 - 18 mmol/L    Glucose 133 (H) 70 - 125 mg/dL    Calcium 9.2 8.5 - 10.5 mg/dL    BUN 38 (H) 8 - 28 mg/dL    Creatinine 2.04 (H) 0.60 - 1.10 mg/dL    GFR MDRD Af Amer 28 (L) >60 mL/min/1.73m2    GFR MDRD Non Af Amer 23 (L) >60 mL/min/1.73m2         Assessment and Plan:  1. DJD (degenerative joint disease)     2. Diabetes mellitus     3. Bipolar disorder     4. Hypertension     5. Bilateral edema of lower extremity     6. CKD (chronic kidney disease), stage 4 (severe)     7. Gout     8. Actinic keratosis        DJD with pain to both knees. She continues to ambulate with rolling walker. DM well controlled with Lantus. Recent HgbA1C was 7.0. Hypertension blood pressures satisfactory controlled. BLE edema chronic only 1+. She spends most of her day up in chair. She continues on Lasix. Gout hx continues on Allopurinol. Bipolar disorder well controlled with Abilify and Wellbutrin. She is very pleasant and chatty per usual.  Actinic keratotic lesions on the tops of the arms.  She does spend a lot of time in the sun.  Her skin is very bronzed.  We did speak at sunscreen however she does not use this. History of anemia.  Resolved.  Hemoglobin 11.9. CKD Creatine 2.04 and GFR 48 stable from 6 months ago.         Electronically signed by: Kim Reynolds, JUANI

## 2021-06-13 NOTE — PROGRESS NOTES
Code Status:  DNR/DNI  Visit Type: Problem Visit (Covid follow-up)     Facility:  [facility] [[id number]]         Facility Type:  (Long Term Care, LTC)    History of Present Illness: Roseanna Mcmahan is a 89 y.o. female who I am seeing today for recent diagnosis of Covid.  Past medical history includes diabetes type II, hypertension, bipolar depression, anemia of chronic disease, chronic kidney disease and LE edema. DJD of the knees.     Today pt lying in bedPatient recently tested positive on 12/15/2020 with massive testing throughout the facilit. She appears very fatigued. She arouses but falls back asleep. Labs today showed creatine of 2.19 with BUN or 27. She appears dry. She has had poor oral intake per the nursing staff. She also had nausea and loose stools yesterday per the nursing staff. No loose stools today. She reports some shortness of breath. She is currently a febrile. She denies any cough. Underlying underlying diabetes mellitus type 2.  She continues on Lantus.  Recently I decrease this.  Due to her poor oral intake.  She does continue on sliding scale. Underlying chronic kidney disease with creatinine around 1.8 at baseline. History of bipolar disorder on Abilify and Wellbutrin.  History of gout on allopurinol.     Active Ambulatory Problems     Diagnosis Date Noted     Cerumen Impaction      (Lower) Leg Localized Swelling Bilateral      Bipolar Disorder      Cellulitis      Type 2 Diabetes Mellitus      Chronic Kidney Disease With Benign Hypertension      Osteoarthritis Of The Knee      Chronic Kidney Disease, Stage 4      Bipolar I disorder, most recent episode (or current) depressed, mild 08/11/2014     Adjustment disorder with mixed anxiety and depressed mood 08/11/2014     Bipolar I disorder, most recent episode (or current) depressed, mild 10/13/2014     Bipolar I disorder, most recent episode (or current) depressed, in partial or unspecified remission 04/13/2015      Intertrigo 12/14/2015     Obesity (BMI 35.0-39.9) with comorbidity (H) 11/04/2020     Resolved Ambulatory Problems     Diagnosis Date Noted     No Resolved Ambulatory Problems     No Additional Past Medical History     Current Outpatient Medications   Medication Sig     allopurinol (ZYLOPRIM) 100 MG tablet Take 100 mg by mouth daily.      ARIPiprazole (ABILIFY) 10 MG tablet Take 1 tablet (10 mg total) by mouth daily.     buPROPion (WELLBUTRIN XL) 150 MG 24 hr tablet Take 1 tablet (150 mg total) by mouth every morning.     glucose 4 GM chewable tablet Chew 16 g daily as needed for low blood sugar.     insulin glargine (LANTUS SOLOSTAR PEN) 100 unit/mL (3 mL) pen Inject 18 Units under the skin at bedtime.     latanoprost (XALATAN) 0.005 % ophthalmic solution Administer 1 drop to both eyes at bedtime.     nystatin (MYCOSTATIN) powder Apply 1 application topically 3 (three) times a day as needed.      triamcinolone (KENALOG) 0.1 % lotion Apply 1 application topically 2 (two) times a day as needed. Apply small amount to upper arms and back as needed.           No Known Allergies    Review of Systems   Somewhat of poor historian.  No fever or chills. No headache, lightheadedness or dizziness.  Patient reports SOB, no cough, no chest pains or palpitations. +nausea, vomiting, constipation, +diarrhea. Poor oral intake.  Patient denies any dysuria, frequency, burning or pain with urination. Otherwise review of systems are negative.         PHYSICAL EXAMINATION:  General: Patient lying in bed.  Appears fatigued.   VS: /78, pulse 83, respirations 16, temperature 98.1, O2 sat 90% RA.  Weight 174.3 pounds.  HEENT: Moist oral mucosa.  Severe hard of hearing.  No aids.  She does wear dentures which are loose in her mouth.  Facial symmetry.  Tongue is midline.  NECK: Supple  CARDIOVASCULAR: S1, S2 without murmur or gallop.   RESPIRATORY: No wheezing, rales or rhonchi.  No cough on exam.  ABDOMEN: Obese. Soft nondistended with  positive bowel sounds x4.  EXTREMITIES: Movesboth upper and lower extremities with generalized weakness, trace pedal edema, no calf tenderness.    NEUROLOGIC: Intact  PSYCHIATRIC: Advanced cognitive impairment with decline.      Assessment and Plan:  1. 2019 novel coronavirus disease (COVID-19)   Pt now reporting shortness of breath.   Obtain CXR 2 view.  No fever, chills or cough. No hypoxia currently .    2. Type 2 diabetes mellitus with complication, without long-term current use of insulin (H)  Continue Lantus to 10 units.  Hold if less than 50% of meal consumed.  Increase blood sugar checks to 3 times daily.   3. Chronic Kidney Disease, Stage 4   baseline 1.8. Pt recently given fluids. Today  Creatine 2.13. Give additional liter of fluids. Follow up BMP in ma.    4.  Nausea  Zofran 8 mg two times a day.    5.  Loose stools  Give imodium 2 mg four times a day prn .   6. Bipolar affective disorder, remission status unspecified (H)   continues on Abilify and Wellbutrin.         Electronically signed by: Kim Reynolds, CNP

## 2021-06-13 NOTE — PROGRESS NOTES
Code Status:  DNR/DNI  Visit Type: Problem Visit (Covid)     Facility:  [facility] [[id number]]         Facility Type:  (Long Term Care, LTC)    History of Present Illness: Roseanan Mcmahan is a 89 y.o. female who I am seeing today for recent diagnosis of Covid.  Past medical history includes diabetes type II, hypertension, bipolar depression, anemia of chronic disease, chronic kidney disease and LE edema. DJD of the knees.       Today pt sitting up in bedside chair.  Patient recently tested positive on 12/15/2020 with massive testing throughout the facility.  Today patient currently afebrile, no chills, no shortness of breath or cough.  She does have underlying cognitive impairment however she attempts to answer questions appropriately.  No hypoxia on exam.  Underlying diabetes type 2.  She continues on Lantus.  History of bipolar disorder on Abilify and Wellbutrin.  History of gout on allopurinol.  Chronic kidney disease with creatinine around 1.8 at baseline.  I did leave a message for her son Hong regarding recent diagnosis and overall prognosis.  Currently patient stable.      Active Ambulatory Problems     Diagnosis Date Noted     Cerumen Impaction      (Lower) Leg Localized Swelling Bilateral      Bipolar Disorder      Cellulitis      Type 2 Diabetes Mellitus      Chronic Kidney Disease With Benign Hypertension      Osteoarthritis Of The Knee      Chronic Kidney Disease, Stage 4      Bipolar I disorder, most recent episode (or current) depressed, mild 08/11/2014     Adjustment disorder with mixed anxiety and depressed mood 08/11/2014     Bipolar I disorder, most recent episode (or current) depressed, mild 10/13/2014     Bipolar I disorder, most recent episode (or current) depressed, in partial or unspecified remission 04/13/2015     Intertrigo 12/14/2015     Obesity (BMI 35.0-39.9) with comorbidity (H) 11/04/2020     Resolved Ambulatory Problems     Diagnosis Date Noted     No Resolved  Ambulatory Problems     No Additional Past Medical History     Current Outpatient Medications   Medication Sig     allopurinol (ZYLOPRIM) 100 MG tablet Take 100 mg by mouth daily.      ARIPiprazole (ABILIFY) 10 MG tablet Take 1 tablet (10 mg total) by mouth daily.     buPROPion (WELLBUTRIN XL) 150 MG 24 hr tablet Take 1 tablet (150 mg total) by mouth every morning.     glucose 4 GM chewable tablet Chew 16 g daily as needed for low blood sugar.     insulin glargine (LANTUS SOLOSTAR PEN) 100 unit/mL (3 mL) pen Inject 18 Units under the skin at bedtime.     latanoprost (XALATAN) 0.005 % ophthalmic solution Administer 1 drop to both eyes at bedtime.     nystatin (MYCOSTATIN) powder Apply 1 application topically 3 (three) times a day as needed.      triamcinolone (KENALOG) 0.1 % lotion Apply 1 application topically 2 (two) times a day as needed. Apply small amount to upper arms and back as needed.           No Known Allergies    Review of Systems   Somewhat of poor historian.  No fever or chills. No headache, lightheadedness or dizziness.  Patient denies SOB, no cough, no chest pains or palpitations. No nausea, vomiting, constipation or diarrhea.  Patient denies any dysuria, frequency, burning or pain with urination. Otherwise review of systems are negative.         PHYSICAL EXAMINATION:  General: Patient sitting up in bedside chair.   VS: /80, pulse 91, respirations 16, temperature 97.9, O2 sat 96% RA.  Weight 174.3 pounds.  HEENT:moist oral mucosa.  Severe hard of hearing.  No aids.  She does wear dentures which are loose in her mouth.  Facial symmetry.  Tongue is midline.  NECK: Supple  CARDIOVASCULAR: S1, S2 without murmur or gallop.   RESPIRATORY: No wheezing, rales or rhonchi.  No cough on exam.  ABDOMEN: Obese.  Soft nondistended with positive bowel sounds x4.  EXTREMITIES: Movesboth upper and lower extremities with generalized weakness, trace pedal edema, no calf tenderness.    NEUROLOGIC:  Intact  PSYCHIATRIC: Advanced cognitive impairment with decline.      Assessment and Plan:  1. 2019 novel coronavirus disease (COVID-19)   patient currently asymptomatic.  No fever chills shortness of breath or cough.  Will follow up with a CBC, BMP and CRP in the a.m.   2. Type 2 diabetes mellitus with complication, without long-term current use of insulin (H)   continue on Lantus.  Continue to monitor blood sugars.   3. Chronic Kidney Disease, Stage 4   baseline 1.8.  Will need to monitor for dehydration.  Encourage food and fluids.   4. Bipolar affective disorder, remission status unspecified (H)   continues on Abilify and Wellbutrin.         Electronically signed by: Kim Reynolds, CNP

## 2021-06-13 NOTE — PROGRESS NOTES
Psychiatric  Progress Note    Date of visit: 10/9/2017           Discussion of Care and Treatment Recommendations:     This is a 86 y.o.  white female with bipolar disorder  She comes for this appointment accompanied by her daughter-in-law.  Patient , her daughter-in-law and I reviewed diagnosis and treatment plan and patient agrees with following recommendations:    1.Patient will take the medications as prescribed.   Psychiatric medications:  Abilify 5 mg every morning for mood stabilization.   Wellbutrin  mg every morning.  Patient will not stop taking medications or adjust them without consulting with the provider.  2.Patient will call with any problems between 2 visits.  3.Patient will go to the emergency room if not feeling safe , unable to function in the community, or if suicidal, homicidal or hearing voices or having paranoia.  4.Patient will abstain from drugs and alcohol.  Patient says she does not use any drugs or alcohol.  5.Patient will not drive if sedated on medications or under influence of any substance.  Patient says she does not drive.  6.Patient will not mix psychiatric medications with drugs and alcohol.   7.Patient will watch his diet and exercise.  8.Patient will see non psychiatric providers for non psychiatric disorders.  9. Next appointment in 6 months.         DIagnoses:       Bipolar disorder manic phase mild      Diabetes mellitus, chronic knee pain, newly discovered kidney insufficiency    Patient Active Problem List   Diagnosis     Cerumen Impaction     (Lower) Leg Localized Swelling Bilateral     Bipolar Disorder     Cellulitis     Type 2 Diabetes Mellitus     Chronic Kidney Disease With Benign Hypertension     Osteoarthritis Of The Knee     Chronic Kidney Disease, Stage 4     Bipolar I disorder, most recent episode (or current) depressed, mild     Adjustment disorder with mixed anxiety and depressed mood     Bipolar I disorder, most recent episode (or current)  depressed, mild     Bipolar I disorder, most recent episode (or current) depressed, in partial or unspecified remission     Intertrigo             Chief Complaint / Subjective:    Chief complaint: knee pain,mood lability, adjusting to hearing aid    History of Present Illness:Roseanna says that she has new hearing aid.  She says she is adjusting to them.  She is yelling when she is talking.  She appears somewhat agitated.  She looks as if she has some mild laura.  Her daughter-in-law says that she is like that most of the time.  She says they can manage her in [facility] assisted living.  She does not want any medication change at this time.  I explained that we could change Abilify from 5  To 7.5 mg, but she prefers to leave it at 5 mg for now and then reevaluate during the next visit.  She says sleep is okay.  She says sometimes she wakes up.  She says that she has a lot of energy.  She says she likes to play the piano.  She has a walker.  She says it helps her being mobile.  She does not like wheelchair.  She says that her doctor told her that she is too old to have knee replacement.  She feels sad because of that.  She says that her family members are along leaving.  Some of them are in their late 90s.  She is aware that she might have to live with the knee pain for years to come.  She denies chest pain, shortness of breath, nausea, vomiting and other symptoms. She has several friends at [facility].  She says that some people irritated because she speaks loudly.  She says she tries to be active.  She likes to go out in the sun when it is warm.  She says again that she likes to play the piano by year.  Her daughter volunteers at [facility] so she sees her 2 times per week and she takes her to do some activities.  She says she is happy that she has good and caring family.  She repeated multiple times how proud she is because her grandson became .  She is not suicidal or homicidal.  She denies  delusions and hallucinations.  Appetite is good.  Her weight is staying the same.  Her daughter-in-law said that her doctor took her off Lipitor.  Her blood pressure today is 141/70 and pulse is 119.  She will see her primary care physician.    Past psychiatric history:  Psychiatric hospitalizations: One time 13 years ago  Suicide attempt: No  ECT: No  Chemical dependency history: No    Family history:  Psychiatric: No  Suicide: No  Chemical dependency:  no    Social history:  Patient [place]  She finished 10th grade.  She is .  Her  is in a nursing home.  She had 4 children.  3 are living now.  One  in .  She lives in a nursing home.  Her children are supportive of her.    Mental Status Examination:   General: adequate hygiene, cooperative   Mentation: ×3  Speech: loud because she is adjusting to hearing aid   Language: intact   Thought process: [place]  Thought content: devoid of delusions and hallucinations   Suicidal thoughts: absent   Homicidal thoughts: absent   Associations: connected   Affect: elevated  Mood: mild laura  Intellectual functioning: within normal limits   Memory: within normal limits   Fund of knowledge: sufficient   Attention and concentration: Normal  Gait: Using a walker    Psychomotor activity: mild agitation  Muscles: No atrophy, no abnormal movements   InSight and judgment: Fair     Medication adjustment: Her daughter-in-law prefers no change at this time.  We will assess next visit.  They will call with problems.  Medication adherence: compliant  Medication side effects: absent  Information about medications: Side effects, benefits and alternative treatments discussed and patient and her daughter-in-law and they agree.    Psychotherapy: Supportive therapy regarding above issues    Education: Diet, exercise, abstinence from drugs and alcohol, patient will not drive if sedated and medications or  under influence of any substance    Lab  "Results: Personally reviewed  Lab Results   Component Value Date    WBC 9.1 07/31/2017    HGB 11.6 (L) 07/31/2017    HCT 37.0 07/31/2017     07/31/2017    CHOL 130 09/17/2015    TRIG 123 09/17/2015    HDL 49 09/17/2015    ALT 7 09/17/2015    AST 12 09/17/2015     07/31/2017    K 4.6 07/31/2017     07/31/2017    CREATININE 2.04 (H) 07/31/2017    BUN 38 (H) 07/31/2017    CO2 30 07/31/2017    TSH 1.54 09/17/2015    HGBA1C 7.0 (H) 07/31/2017    MICROALBUR 2.12 (H) 02/06/2014       Vital signs:  Vitals:    10/09/17 1412   BP: 141/70   Pulse: (!) 119   Resp: 20   Temp: 98.1  F (36.7  C)         Allergies: Review of patient's allergies indicates no known allergies.         Medications:       Current Outpatient Prescriptions   Medication Sig Note     acetaminophen 500 mg coapsule Take 500 mg by mouth every 4 (four) hours as needed for fever.       allopurinol (ZYLOPRIM) 100 MG tablet Take 100 mg by mouth daily.  5/23/2016: Received from: External Pharmacy     ARIPiprazole (ABILIFY) 5 MG tablet Take 1 tablet (5 mg total) by mouth every morning.      blood sugar diagnostic Strp Use As Directed 4 (four) times a day. TEST 4 TIMES A DAY ON MON and THURS and PRN  Accu-chek Silvana strips      buPROPion (WELLBUTRIN XL) 150 MG 24 hr tablet Take 1 tablet (150 mg total) by mouth every morning.      CERAMIDES 1,3,6-11 (CERAVE TOP) Apply 1 application topically daily as needed.       furosemide (LASIX) 20 MG tablet Take 10 mg by mouth every morning.       glucose 4 GM chewable tablet Chew 16 g daily as needed for low blood sugar.      insulin syringe-needle U-100 (INSULIN SYRINGE) 1/2 mL 30 x 5/16\" Syrg Use 1 unit marking on U-100 syringe As Directed 4 (four) times a day.      LANTUS 100 unit/mL injection Inject 18 Units under the skin at bedtime.  5/23/2016: Received from: External Pharmacy     nystatin (MYCOSTATIN) powder Apply 1 application topically 3 (three) times a day as needed.       triamcinolone (KENALOG) 0.1 " % cream 1 application 2 (two) times a day as needed.  9/19/2016: Received from: External Pharmacy            Review of Systems:    Chronic knee pain, otherwise reminder of review of 10 point systems is negative.    Coordination of Care:   More than 25 minutes spent on this visit  with more than 50% of time spent on coordination of care including: Reviewing the chart, coordinating care with the nurse, reviewing and fillling out  facility forms, psychoeducation, entering orders and preparing documentation for the visit, providing supportive therapy regarding grief to be leaving    This note is created with  the help of dictation system.  All grammatical and typing errors or context distortion are  unintentional and inherent to software.    Flor Barrios MD

## 2021-06-13 NOTE — PROGRESS NOTES
Correct pharmacy verified with patient and confirmed in snapshot? [x] yes []no    Charge captured ? [x] yes  [] no    Medications Phoned  to Pharmacy [] yes [x]no  Name of Pharmacist:  List Medications, including dose, quantity and instructions      Medication Prescriptions given to patient   [] yes  [x] no   List the name of the drug the prescription was written for.       Medications ordered this visit were e-scribed.  Verified by order class [x] yes  [] no    Medication changes or discontinuations were communicated to patient's pharmacy: [] yes  [x] no, none discontinued    UA collected [] yes  [x] no    [place] Prescription Monitoring Program Reviewed? [] yes  [x] no    Referrals were made to:  NA    Future appointment was made: [x] yes  [] no    Dictation completed at time of chart check: [x] yes  [] no    I have checked the documentation for today s encounters and the above information has been reviewed and completed.

## 2021-06-13 NOTE — PROGRESS NOTES
Code Status:  DNR/DNI  Visit Type: Problem Visit (Covid follow-up)     Facility:  [facility] [[id number]]         Facility Type:  (Long Term Care, LTC)    History of Present Illness: Roseanna Mcmahan is a 89 y.o. female who I am seeing today for recent diagnosis of Covid.  Past medical history includes diabetes type II, hypertension, bipolar depression, anemia of chronic disease, chronic kidney disease and LE edema. DJD of the knees.     Today pt sitting up in bedside chair. Patient recently tested positive on 12/15/2020 with massive testing throughout the facility. Today patient currently afebrile, no chills, no shortness of breath. Occ. dry cough. She does have underlying cognitive impairment. She is somewhat of a poor historian. She has poor appetite. She has underlying diabetes.  Blood sugar this a.m. with blood work was 49.  She was given orange juice and it came to 68.  She does continue on Lantus 15 units.  Today creatinine 2.03.  GFR 20.  BUN 35.  Hemoglobin 10.8.  She does have underlying chronic kidney disease with creatinine around 1.8 at baseline. History of bipolar disorder on Abilify and Wellbutrin.  History of gout on allopurinol.     Active Ambulatory Problems     Diagnosis Date Noted     Cerumen Impaction      (Lower) Leg Localized Swelling Bilateral      Bipolar Disorder      Cellulitis      Type 2 Diabetes Mellitus      Chronic Kidney Disease With Benign Hypertension      Osteoarthritis Of The Knee      Chronic Kidney Disease, Stage 4      Bipolar I disorder, most recent episode (or current) depressed, mild 08/11/2014     Adjustment disorder with mixed anxiety and depressed mood 08/11/2014     Bipolar I disorder, most recent episode (or current) depressed, mild 10/13/2014     Bipolar I disorder, most recent episode (or current) depressed, in partial or unspecified remission 04/13/2015     Intertrigo 12/14/2015     Obesity (BMI 35.0-39.9) with comorbidity (H) 11/04/2020      Resolved Ambulatory Problems     Diagnosis Date Noted     No Resolved Ambulatory Problems     No Additional Past Medical History     Current Outpatient Medications   Medication Sig     allopurinol (ZYLOPRIM) 100 MG tablet Take 100 mg by mouth daily.      ARIPiprazole (ABILIFY) 10 MG tablet Take 1 tablet (10 mg total) by mouth daily.     buPROPion (WELLBUTRIN XL) 150 MG 24 hr tablet Take 1 tablet (150 mg total) by mouth every morning.     glucose 4 GM chewable tablet Chew 16 g daily as needed for low blood sugar.     insulin glargine (LANTUS SOLOSTAR PEN) 100 unit/mL (3 mL) pen Inject 18 Units under the skin at bedtime.     latanoprost (XALATAN) 0.005 % ophthalmic solution Administer 1 drop to both eyes at bedtime.     nystatin (MYCOSTATIN) powder Apply 1 application topically 3 (three) times a day as needed.      triamcinolone (KENALOG) 0.1 % lotion Apply 1 application topically 2 (two) times a day as needed. Apply small amount to upper arms and back as needed.           No Known Allergies    Review of Systems   Somewhat of poor historian.  No fever or chills. No headache, lightheadedness or dizziness.  Patient denies SOB, no cough, no chest pains or palpitations. No nausea, vomiting, constipation or diarrhea. Poor oral intake.  Patient denies any dysuria, frequency, burning or pain with urination. Otherwise review of systems are negative.         PHYSICAL EXAMINATION:  General: Patient sitting up in bedside chair.  Appears somewhat sleepy.  VS: /85, pulse 80, respirations 20, temperature 97.7, O2 sat 93% RA.  Weight 174.3 pounds.  HEENT: Moist oral mucosa.  Severe hard of hearing.  No aids.  She does wear dentures which are loose in her mouth.  Facial symmetry.  Tongue is midline.  NECK: Supple  CARDIOVASCULAR: S1, S2 without murmur or gallop.   RESPIRATORY: No wheezing, rales or rhonchi.  No cough on exam.  ABDOMEN: Obese. Soft nondistended with positive bowel sounds x4.  EXTREMITIES: Movesboth upper and  lower extremities with generalized weakness, trace pedal edema, no calf tenderness.    NEUROLOGIC: Intact  PSYCHIATRIC: Advanced cognitive impairment with decline.      Assessment and Plan:  1. 2019 novel coronavirus disease (COVID-19)   patient currently asymptomatic.  No fever chills shortness of breath or cough.  Will follow up with a CBC, BMP and CRP in the a.m.   2. Type 2 diabetes mellitus with complication, without long-term current use of insulin (H)   low blood sugar this a.m.  Decrease Lantus to 10 units.  Hold if less than 50% of meal consumed.  Increase blood sugar checks to 3 times daily.   3. Chronic Kidney Disease, Stage 4   baseline 1.8.  Now 2.03.  Give 1 L of normal saline at 75 cc/h.  Follow-up BMP on Monday.  Encourage food and fluids.   4. Bipolar affective disorder, remission status unspecified (H)   continues on Abilify and Wellbutrin.         Electronically signed by: Kim Reynolds, CNP

## 2021-06-14 NOTE — PROGRESS NOTES
[facility]    Facility:   [facility] [[id number]]   Code Status:       CHIEF COMPLAINT/REASON FOR VISIT:  Chief Complaint   Patient presents with     Review Of Multiple Medical Conditions     Type 2 diabetes, bipolar disorder with depression, anemia of chronic disease, chronic kidney disease, lower extremity edema, degenerative joint disease of both knees.       HISTORY:      HPI: Roseanna is a 86 y.o. female who resides here at the [facility] for multiple medical problems she does have a history of bipolar disorder with depression as well as anemia of chronic disease and type 2 diabetes with stage IV kidney disease.  I did review her kidney function tests and they have not changed too much over the last year.  She does have lower extremity edema and an element of CHF likely.  She is a DNR/DNI at this time and she does have hypertension which is in good control.  She is currently residing well in this facility and has no new issues according to the nursing staff.    Patient claims to be doing well at this time and has no new concerns.  She is breathing okay at this time and she still has some lower extremity edema but it does not seem to bother her at this point.  She seems to be euvolemic by exam and her lungs are clear.  She is moving her bowels without difficulty and denies any other issues and staff has no concerns at this time.  Her bipolar is well controlled.    History reviewed. No pertinent past medical history.          History reviewed. No pertinent family history.  Social History     Social History     Marital status:      Spouse name: N/A     Number of children: N/A     Years of education: N/A     Social History Main Topics     Smoking status: Former Smoker     Types: Cigarettes     Start date: 5/23/1949     Quit date: 5/23/1990     Smokeless tobacco: Never Used     Alcohol use No     Drug use: No     Sexual activity: Not Asked     Other  Topics Concern     None     Social History Narrative         Review of Systems   Constitutional:        Patient denies any pain fevers chills nausea vomiting diarrhea change in vision hearing taste or smell weakness one side the other chest pain or shortness of breath.  She still has lower extremity edema but she claims it has not changed she is doing fine and has no new issues and the remainder of the review of systems is negative.       .  Vitals:    11/22/17 1449   BP: 123/83   Pulse: 79   Resp: 22   Temp: 97.8  F (36.6  C)   SpO2: 97%       Physical Exam   Constitutional: No distress.   HENT:   Head: Normocephalic and atraumatic.   Nose: Nose normal.   Mouth/Throat: No oropharyngeal exudate.   Eyes: Right eye exhibits no discharge. Left eye exhibits no discharge. No scleral icterus.   Neck: Neck supple. No thyromegaly present.   Cardiovascular:   Patient's heart sounds are irregularly irregular with adequate rate control.   Pulmonary/Chest: Effort normal and breath sounds normal. No respiratory distress. She has no wheezes. She has no rales.   Abdominal: Soft. Bowel sounds are normal. She exhibits distension. There is no tenderness. There is no rebound.   Musculoskeletal: She exhibits edema.   Neurological: She is alert. She exhibits normal muscle tone.   Skin: Skin is warm and dry. She is not diaphoretic.   Psychiatric: She has a normal mood and affect.         LABS: Labs from 7/31/2017 are as follows hemoglobin A1c was 7.0, white count was 9.7, hemoglobin was 11.6 and platelets were 213,000.  Basic metabolic profile was as follows sodium was 144, potassium 4.6, calcium was 9.2, creatinine was 2.04, unchanged since 2016 BUN was 38 which is slightly higher than 2016 and GFR was 23.      ASSESSMENT:      ICD-10-CM    1. DJD (degenerative joint disease) M19.90    2. Knee pain M25.569    3. Diabetes mellitus E11.9    4. Bipolar disorder F31.9    5. Hypertension I10    6. Bilateral edema of lower extremity R60.0         PLAN: Plan at this time is to continue to monitor her blood sugars as ordered and no changes in her medications.  Her bipolar disorder as well compensated her diabetes is okay her blood pressure is normotensive at this time in her bilateral lower extremity edema seems to be stable from what I have researched in the notes.  I would however keep checking the basic metabolic profile on a 6 months level secondary to her chronic kidney disease.  I will continue to monitor above medical problems and work with the nurse practitioner on recommendations.  No other changes to care plan at this time.      Total  minutes of which % was spent counseling and coordination of care of the above plan.    Electronically signed by: Sher Monaco DO

## 2021-06-14 NOTE — PROGRESS NOTES
Code Status:  DNR/DNI  Visit Type: Review Of Multiple Medical Conditions     Facility:  [facility] [[id number]]         Facility Type:  (Long Term Care, LTC)    History of Present Illness: Roseanna Mcmahan is a 86 y.o. female who I am seeing today for follow up of chronic medical conditions including diabetes type II, hypertension, bipolar depression, anemia of chronic disease, chronic kidney disease and LE edema. DJD of the knees. Recent increase pain her knees. Tylenol scheduled. Pain improved.      Active Ambulatory Problems     Diagnosis Date Noted     Cerumen Impaction      (Lower) Leg Localized Swelling Bilateral      Bipolar Disorder      Cellulitis      Type 2 Diabetes Mellitus      Chronic Kidney Disease With Benign Hypertension      Osteoarthritis Of The Knee      Chronic Kidney Disease, Stage 4      Bipolar I disorder, most recent episode (or current) depressed, mild 08/11/2014     Adjustment disorder with mixed anxiety and depressed mood 08/11/2014     Bipolar I disorder, most recent episode (or current) depressed, mild 10/13/2014     Bipolar I disorder, most recent episode (or current) depressed, in partial or unspecified remission 04/13/2015     Intertrigo 12/14/2015     Resolved Ambulatory Problems     Diagnosis Date Noted     No Resolved Ambulatory Problems     No Additional Past Medical History     Meds reviewed at facility.       No Known Allergies    Review of Systems   No fevers or chills. No headache, lightheadedness or dizziness. No SOB, chest pains or palpitations. Appetite is good. No nausea, vomiting, constipation or diarrhea. No dysuria, frequency, burning or pain with urination. She denies any increased depressive symptoms. Pain improved with scheduled tylenol.       Physical Exam  PHYSICAL EXAMINATION:  Vital signs:   Vitals:    12/14/17 2157   BP: 152/81   Pulse: 94   Resp: 18   Temp: 97.2  F (36.2  C)   SpO2: 98%       PHYSICAL EXAMINATION:  Vital signs:   Vitals:     12/14/17 2157   BP: 152/81   Pulse: 94   Resp: 18   Temp: 97.2  F (36.2  C)   SpO2: 98%     General: Awake, Alert, oriented x3, appropriately, follows simple commands, conversant. Pt sitting up in recliner.   HEENT:PERRLA, Pink conjunctiva, anicteric sclerae, moist oral mucosa  NECK: Supple, without any lymphadenopathy, or masses  CVS:  S1  S2, without murmur or gallop.   LUNG: Clear to auscultation, No wheezes, rales or rhonci.  BACK: No kyphosis of the thoracic spine  ABDOMEN: Soft, obese, nontender to palpation, with positive bowel sounds  EXTREMITIES: Good range of motion on both upper and lower extremities, DJD of mx joints no focal inflammation,Trace pedal edema, no calf tenderness.   SKIN: Warm and dry.   NEUROLOGIC: Intact, pulses palpable  PSYCHIATRIC: Cognition intact. Very pleasant and talkative.     Labs:    Lab Results   Component Value Date    WBC 9.1 07/31/2017    HGB 11.6 (L) 07/31/2017    HCT 37.0 07/31/2017     07/31/2017     07/31/2017     Results for orders placed or performed in visit on 07/31/17   Basic Metabolic Panel   Result Value Ref Range    Sodium 144 136 - 145 mmol/L    Potassium 4.6 3.5 - 5.0 mmol/L    Chloride 106 98 - 107 mmol/L    CO2 30 22 - 31 mmol/L    Anion Gap, Calculation 8 5 - 18 mmol/L    Glucose 133 (H) 70 - 125 mg/dL    Calcium 9.2 8.5 - 10.5 mg/dL    BUN 38 (H) 8 - 28 mg/dL    Creatinine 2.04 (H) 0.60 - 1.10 mg/dL    GFR MDRD Af Amer 28 (L) >60 mL/min/1.73m2    GFR MDRD Non Af Amer 23 (L) >60 mL/min/1.73m2         Assessment and Plan:  1. DJD (degenerative joint disease)     2. Knee pain     3. Diabetes mellitus     4. Bipolar disorder     5. Hypertension     6. Chronic Kidney Disease, Stage 4     7. Gout       DJD of mx joints with recent increased pain in her knees. I recently scheduled her Tylenol. Pain improved. She continues to ambulate with rolling walker independently. DM satisfactory controlled with Lantus.Recent HgbA1C was 7.0. Hypertension blood  pressures satisfactory controlled.  Gout hx continues on Allopurinol. Bipolar disorder well controlled with Abilify and Wellbutrin.       Electronically signed by: Kim Reynolds, JUANI

## 2021-06-14 NOTE — PROGRESS NOTES
"Code Status:  DNR/DNI  Visit Type: Problem Visit (Covid follow-up)     Facility:  [facility] [[id number]]         Facility Type:  (Long Term Care, LTC)    History of Present Illness: Roseanna Mcmahan is a 89 y.o. female who I am seeing today for follow up of recent diagnosis of Covid.  Past medical history includes diabetes type II, hypertension, bipolar depression, anemia of chronic disease, chronic kidney disease and LE edema. DJD of the knees.     Today pt lying in bed. She does arouse and is talking today. Patient recently tested positive on 12/15/2020 with massive testing throughout the facilit. She appears very fatigued. She developed acute on chronic kidney failure. Creatine 2.19. She was given a liter of fluids last evening. She has continued with poor oral intake. She denies any shortness of breath. However per the notes she had an episode last evening where her sats were 89% on RA. CXR yesterday was negative. No cough on exam. She did have low grade temperature this am. She has hx of UTI. UA obtained this am. She was given a loading dose of Rocephin this am. Later during I visited her again and her son and daughter was present. I did review current plan of care and overall prognosis. At this time family does not want her sent to hospital. Reviewed goals of care and family \"feels it would be very traumatic to move her and she may decline rapidly if taken out her normal environment.\" No further loose stools today. No nausea. She continues on scheduled Zofran two times a day. Her son was able to feed her about 35% of her brunch today. She was able to drink about 480cc. Her blood sugars continue to fluctuate. She continues on sliding scale. Her Lantus is to be held if < than 50% of meal consumed.       Active Ambulatory Problems     Diagnosis Date Noted     Cerumen Impaction      (Lower) Leg Localized Swelling Bilateral      Bipolar Disorder      Cellulitis      Type 2 Diabetes Mellitus      " Chronic Kidney Disease With Benign Hypertension      Osteoarthritis Of The Knee      Chronic Kidney Disease, Stage 4      Bipolar I disorder, most recent episode (or current) depressed, mild 08/11/2014     Adjustment disorder with mixed anxiety and depressed mood 08/11/2014     Bipolar I disorder, most recent episode (or current) depressed, mild 10/13/2014     Bipolar I disorder, most recent episode (or current) depressed, in partial or unspecified remission 04/13/2015     Intertrigo 12/14/2015     Obesity (BMI 35.0-39.9) with comorbidity (H) 11/04/2020     Resolved Ambulatory Problems     Diagnosis Date Noted     No Resolved Ambulatory Problems     No Additional Past Medical History     Current Outpatient Medications   Medication Sig     allopurinol (ZYLOPRIM) 100 MG tablet Take 100 mg by mouth daily.      ARIPiprazole (ABILIFY) 10 MG tablet Take 1 tablet (10 mg total) by mouth daily.     buPROPion (WELLBUTRIN XL) 150 MG 24 hr tablet Take 1 tablet (150 mg total) by mouth every morning.     glucose 4 GM chewable tablet Chew 16 g daily as needed for low blood sugar.     insulin glargine (LANTUS SOLOSTAR PEN) 100 unit/mL (3 mL) pen Inject 18 Units under the skin at bedtime.     latanoprost (XALATAN) 0.005 % ophthalmic solution Administer 1 drop to both eyes at bedtime.     nystatin (MYCOSTATIN) powder Apply 1 application topically 3 (three) times a day as needed.      triamcinolone (KENALOG) 0.1 % lotion Apply 1 application topically 2 (two) times a day as needed. Apply small amount to upper arms and back as needed.           No Known Allergies    Review of Systems   Somewhat of poor historian.  No fever or chills. No headache, lightheadedness or dizziness.  Patient denies any SOB, no cough, no chest pains or palpitations. Nausea improved, no vomiting, constipation, no diarrhea. Poor oral intake.  Patient denies any dysuria, frequency, burning or pain with urination. Otherwise review of systems are negative.          PHYSICAL EXAMINATION:  General: Patient lying in bed.  Lethargic but arouses. She is talking today.   VS: /84, pulse 107, respirations 16, temperature 98.4, O2 sat 90% RA.  Weight 174.3 pounds.    HEENT:  Dry oral mucosa. Severe hard of hearing.  No aids.  She does wear dentures but they have been removed.    NECK: Supple  CARDIOVASCULAR: S1, S2 without murmur or gallop.   RESPIRATORY: No wheezing, rales or rhonchi. Shallow respiratory effort.  No cough on exam.  ABDOMEN: Obese. Soft nondistended with positive bowel sounds x4.  EXTREMITIES: Movesboth upper and lower extremities with diffuse weakness.   NEUROLOGIC: Intact  PSYCHIATRIC: Advanced cognitive impairment with decline.      Assessment and Plan:  1. 2019 novel coronavirus disease (COVID-19)  CXR yesterday was negative. Continue to monitor for hypoxia. Ok for O2 at 1-3 liters to keep sats > 90%.   Low-grade temp of 99.   2. Type 2 diabetes mellitus with complication, without long-term current use of insulin (H)  Continue Lantus to 10 units.  Hold if less than 50% of meal consumed.  Increase blood sugar checks to 3 times daily.   3. Chronic Kidney Disease, Stage 4   baseline 1.8. Today Creatine 2.30. Given additional liter of fluids. Repeat BMP in am.   Obtain UA UC.  Give 1 g of IM Rocephin with lidocaine x1 now.   4.  Nausea  Zofran 8 mg two times a day.    5.  Loose stools  Give imodium 2 mg four times a day prn .   6. Bipolar affective disorder, remission status unspecified (H)   continues on Abilify and Wellbutrin.     Total time spent for this visit was 35 minutes with greater than 50% of the time spent face-to-face with patient, son and daughter reviewing goals of care.  At this time family does not wish to hospitalize.      Electronically signed by: Kim Reynolds CNP

## 2021-06-14 NOTE — PROGRESS NOTES
Code Status:  DNR/DNI  Visit Type: Problem Visit (COVID follow up )     Facility:  [facility] [[id number]]         Facility Type:  (Long Term Care, LTC)    History of Present Illness: Roseanna Mcmahan is a 89 y.o. female who I am seeing today for follow up of recent diagnosis of Covid.  Past medical history includes diabetes type II, hypertension, bipolar depression, anemia of chronic disease, chronic kidney disease and LE edema. DJD of the knees.     Today pt lying in bed. Patient recently tested positive on 12/15/2020 with massive testing throughout the facilit. Pt very difficult to arouse. She continues with low grade temperature of 99. She was given Tylenol suppository this a.m.  Patient with acute on chronic kidney failure.  Creatinine today 2.33.  She has received 2 L of fluid.  Last evening she had acute onset of hypoxia with sats at 83%.  Recent chest x-ray was negative.  She has continued with cough.  She was given a loading dose of IM Rocephin with lidocaine yesterday.  She does have history of chronic UTI.  UA UC was obtained yesterday.  Patient with very poor oral intake.  She has not taken her meds in 2 days.  She has underlying diabetes.  She is usually on Lantus however this has been held x3 days secondary to less than 50% of meals consumed.  She has had some low blood sugars.  She initially had some loose stools however this improved with Imodium.  She has also had some nausea.  She continues on Compazine and Zofran.        Active Ambulatory Problems     Diagnosis Date Noted     Cerumen Impaction      (Lower) Leg Localized Swelling Bilateral      Bipolar Disorder      Cellulitis      Type 2 Diabetes Mellitus      Chronic Kidney Disease With Benign Hypertension      Osteoarthritis Of The Knee      Chronic Kidney Disease, Stage 4      Bipolar I disorder, most recent episode (or current) depressed, mild 08/11/2014     Adjustment disorder with mixed anxiety and depressed mood 08/11/2014      Bipolar I disorder, most recent episode (or current) depressed, mild 10/13/2014     Bipolar I disorder, most recent episode (or current) depressed, in partial or unspecified remission 04/13/2015     Intertrigo 12/14/2015     Obesity (BMI 35.0-39.9) with comorbidity (H) 11/04/2020     AMS (altered mental status) 12/23/2020     Resolved Ambulatory Problems     Diagnosis Date Noted     No Resolved Ambulatory Problems     Past Medical History:   Diagnosis Date     Diabetes mellitus, type II (H)      No current outpatient medications on file.           No Known Allergies    Review of Systems   Pt unable to verbalize today.         PHYSICAL EXAMINATION:  General: Patient lying in bed.  Lethargic, difficult to arouse.   VS: /88, pulse 114, respirations 16, temperature 97.5, O2 sat 91% RA.  Weight 174.3 pounds.    HEENT:  Dry oral mucosa. Severe hard of hearing.  No aids.  She does wear dentures but they have been removed.    NECK: Supple  CARDIOVASCULAR: S1, S2 without murmur or gallop.   RESPIRATORY: No wheezing, rales or rhonchi. Shallow respiratory effort.  No cough on exam. O2 on 2L NC.   ABDOMEN: Obese. Soft nondistended with positive bowel sounds x4.  EXTREMITIES: Movesboth upper and lower extremities with diffuse weakness.   NEUROLOGIC: Intact  PSYCHIATRIC: Advanced cognitive impairment with decline.      Assessment and Plan:  1. 2019 novel coronavirus disease (COVID-19)  CXR yesterday was negative. Continue O2 at 1-3 liters to keep sats > 90%.   Low-grade temp of 99.   1 gm IM Rocephin given yesterday.    2. Type 2 diabetes mellitus with complication, without long-term current use of insulin (H)  D/c Lantus.Pt has not received in 3 days due to poor oral intake.  Start sliding scale insulin with meals.Hold if less than 50% of meal consumed.  Continue blood sugar checks to 3 times daily.   3. Chronic Kidney Disease, Stage 4   baseline 1.8. Today Creatine 2.33. Pt has received 2 liters of fluid.    UC  pending.    4.  Nausea  Zofran 8 mg two times a day.    5.  Loose stools  Give imodium 2 mg four times a day prn .   6. Bipolar affective disorder, remission status unspecified (H)   continues on Abilify and Wellbutrin.     Pt continues to decline. She is now hypoxic requiring oxygen and difficult to arouse. At this time I suggest hospitalization for further eval. Son notified. Ok to send to [facility] ER for further eval.       Electronically signed by: Kim Reynolds, CNP

## 2021-06-14 NOTE — PROGRESS NOTES
[facility]      Facility:    [facility] [[id number]]  Code Status: DNR/DNI      Chief Complaint/Reason for Visit:  Chief Complaint   Patient presents with     H & P     Altered mental status from nova coronavirus, acute respiratory failure with hypoxemia type 2 diabetes, acute encephalopathy.       HPI:   Roseanna is a 89 y.o. female who patient admitted to the hospital on December 23, 2020.  She was admitted from the nursing home with acute respiratory failure failure in COVID-19.  She had metabolic encephalopathy was decided to send in the hospital for trial of remdesivir.  She already has had the appropriate care of here for COVID-19 but she continued to decline.  She did have a Covid pneumonia and respiratory acidosis.  Her prognosis is very poor and was changed to full comfort care and currently on hospice care at this time.  She was transferred back here at [facility].    She is lying in bed does appear comfortable at this time apparently hospice has been here this morning and she is currently on morphine as well as lorazepam.  She does not answer questions and she is not arousable at this time.    Past Medical History:  Past Medical History:   Diagnosis Date     (Lower) Leg Localized Swelling Bilateral     Created by Conversion      Adjustment disorder with mixed anxiety and depressed mood 8/11/2014     Bipolar Disorder     Created by Conversion  Replacement Utility updated for latest IMO load     Bipolar I disorder, most recent episode (or current) depressed, in partial or unspecified remission 4/13/2015     Bipolar I disorder, most recent episode (or current) depressed, mild 8/11/2014     Replacement Utility updated for latest IMO load     Cellulitis     Created by Conversion      Cerumen Impaction     Created by Conversion      Chronic Kidney Disease With Benign Hypertension     Created by Conversion      Chronic Kidney Disease, Stage 4     Created by Conversion       Diabetes mellitus, type II (H)      Intertrigo 2015     Osteoarthritis Of The Knee     Created by Conversion  Replacement Utility updated for latest IMO load     Type 2 Diabetes Mellitus     Created by Conversion            Surgical History:  Past Surgical History:   Procedure Laterality Date     wrist surgery Left        Family History:   Family History   Problem Relation Age of Onset     Diabetes Sister      Cancer Sister      Diabetes Brother      Cancer Brother      Diabetes Son      Diabetes Sister      Cancer Son         colon cancer       Social History:    Social History     Socioeconomic History     Marital status:      Spouse name: None     Number of children: None     Years of education: None     Highest education level: None   Occupational History     None   Social Needs     Financial resource strain: None     Food insecurity     Worry: None     Inability: None     Transportation needs     Medical: None     Non-medical: None   Tobacco Use     Smoking status: Former Smoker     Types: Cigarettes     Start date: 1949     Quit date: 1990     Years since quittin.6     Smokeless tobacco: Never Used   Substance and Sexual Activity     Alcohol use: No     Frequency: Never     Comment: rare     Drug use: No     Sexual activity: Never   Lifestyle     Physical activity     Days per week: None     Minutes per session: None     Stress: None   Relationships     Social connections     Talks on phone: None     Gets together: None     Attends Orthodox service: None     Active member of club or organization: None     Attends meetings of clubs or organizations: None     Relationship status: None     Intimate partner violence     Fear of current or ex partner: None     Emotionally abused: None     Physically abused: None     Forced sexual activity: None   Other Topics Concern     None   Social History Narrative     None          Review of Systems   Constitutional:        Could not review of  systems secondary to patient is likely actively dying at this time and is nonresponsive.       Vitals:    12/31/20 1224   BP: (!) 85/55   Pulse: (!) 56   Resp: 16   Temp: 97.5  F (36.4  C)   SpO2: 90%       Physical Exam  Constitutional:       Comments: Lying in bed comfortably unresponsive.   Cardiovascular:      Rate and Rhythm: Bradycardia present.   Pulmonary:      Breath sounds: Wheezing and rales present.   Abdominal:      General: There is distension.   Neurological:      Comments: Responsive.         Medication List:  Current Outpatient Medications   Medication Sig     atropine 1 % ophthalmic solution 1 to 2 drops orally or sublingually every 4 hours as needed for secretions.     bisacodyL (DULCOLAX) 10 mg suppository Insert 1 suppository (10 mg total) into the rectum daily. As needed for bowel movement.     HYDROmorphone (DILAUDID) 1 mg/mL Liqd solution Take 0.5-2 mL (0.5-2 mg total) every 4 (four) hours by mouth as needed for pain or shortness of breath.  Oral or sublingual     LORazepam (ATIVAN) 2 mg/1 mL concentrated solution Take 0.25-1 mL (0.5-2 mg total) by mouth every 4 (four) hours as needed for pain or shortness of breath. Oral or sublingual     morphine 100 mg/5 mL (20 mg/mL) concentrated solution Take 0.25-0.5 mL (5-10 mg total) by mouth every 4 (four) hours as needed for pain or shortness of breath.       Labs: Reviewed.      Assessment:    ICD-10-CM    1. 2019 novel coronavirus disease (COVID-19)  U07.1    2. Chronic Kidney Disease, Stage 4  N18.4    3. Bipolar affective disorder, remission status unspecified (H)  F31.9    4. Type 2 diabetes mellitus with complication, without long-term current use of insulin (H)  E11.8    5. Acute renal failure superimposed on chronic kidney disease, unspecified CKD stage, unspecified acute renal failure type (H)  N17.9     N18.9    6. Morbid obesity (H)  E66.01        Plan: Plan at this time we will continue with comfort cares.  I did look over the  medications and the only one that was not on the med reconciliation was acetaminophen suppository.  I tried to put to put that in the med reconciliation but would not go but everything else is accurate.  I did discuss with nursing staff is anything else I can do at this time and I will follow along with hospice and no other changes to care plan at this time we will continue to monitor above medical problems and no other changes.        Electronically signed by: Sher Monaco, DO

## 2021-06-16 PROBLEM — E66.01 MORBID OBESITY (H): Status: ACTIVE | Noted: 2020-01-01

## 2021-06-16 PROBLEM — R41.82 AMS (ALTERED MENTAL STATUS): Status: ACTIVE | Noted: 2020-01-01

## 2021-06-16 NOTE — PROGRESS NOTES
Code Status:  DNR/DNI  Visit Type: Review Of Multiple Medical Conditions     Facility:  [facility] [[id number]]         Facility Type:  (Long Term Care, LTC)    History of Present Illness: Roseanna Mcmahan is a 86 y.o. female who I am seeing today for follow up of chronic medical conditions including diabetes type II, hypertension, bipolar depression, anemia of chronic disease, chronic kidney disease and LE edema. DJD of the knees.  Patient denies any pain in her knees.  Overall she states she is feeling quite well.  She does have significant hearing loss.    Active Ambulatory Problems     Diagnosis Date Noted     Cerumen Impaction      (Lower) Leg Localized Swelling Bilateral      Bipolar Disorder      Cellulitis      Type 2 Diabetes Mellitus      Chronic Kidney Disease With Benign Hypertension      Osteoarthritis Of The Knee      Chronic Kidney Disease, Stage 4      Bipolar I disorder, most recent episode (or current) depressed, mild 08/11/2014     Adjustment disorder with mixed anxiety and depressed mood 08/11/2014     Bipolar I disorder, most recent episode (or current) depressed, mild 10/13/2014     Bipolar I disorder, most recent episode (or current) depressed, in partial or unspecified remission 04/13/2015     Intertrigo 12/14/2015     Resolved Ambulatory Problems     Diagnosis Date Noted     No Resolved Ambulatory Problems     No Additional Past Medical History     Meds reviewed at facility.       No Known Allergies    Review of Systems   No fevers or chills. No headache, lightheadedness or dizziness. No SOB, chest pains or palpitations. Appetite is good. No nausea, vomiting, constipation or diarrhea. No dysuria, frequency, burning or pain with urination. She denies any increased depressive symptoms. Pain improved with scheduled tylenol.       Physical Exam  PHYSICAL EXAMINATION:  Vital signs:   Vitals:    02/12/18 2205   BP: 135/69   Pulse: 72   Resp: 18   Temp: 97.1  F (36.2  C)   SpO2:  99%       PHYSICAL EXAMINATION:  Vital signs:   Vitals:    02/12/18 2205   BP: 135/69   Pulse: 72   Resp: 18   Temp: 97.1  F (36.2  C)   SpO2: 99%     General: Awake, Alert, oriented x3, appropriately, follows simple commands, conversant. Pt sitting up in recliner.   HEENT:PERRLA, Pink conjunctiva, anicteric sclerae, moist oral mucosa.  Severe hard of hearing.  No aids.  NECK: Supple, without any lymphadenopathy, or masses  CVS:  S1  S2, without murmur or gallop.   LUNG: Clear to auscultation, No wheezes, rales or rhonci.  BACK: No kyphosis of the thoracic spine  ABDOMEN: Soft, obese, nontender to palpation, with positive bowel sounds  EXTREMITIES: Good range of motion on both upper and lower extremities, DJD of mx joints no focal inflammation,Trace pedal edema, no calf tenderness.   SKIN: Warm and dry.   NEUROLOGIC: Intact, pulses palpable  PSYCHIATRIC: Cognition intact.     Labs:    Lab Results   Component Value Date    WBC 9.1 07/31/2017    HGB 11.6 (L) 07/31/2017    HCT 37.0 07/31/2017     07/31/2017     07/31/2017     Results for orders placed or performed in visit on 07/31/17   Basic Metabolic Panel   Result Value Ref Range    Sodium 144 136 - 145 mmol/L    Potassium 4.6 3.5 - 5.0 mmol/L    Chloride 106 98 - 107 mmol/L    CO2 30 22 - 31 mmol/L    Anion Gap, Calculation 8 5 - 18 mmol/L    Glucose 133 (H) 70 - 125 mg/dL    Calcium 9.2 8.5 - 10.5 mg/dL    BUN 38 (H) 8 - 28 mg/dL    Creatinine 2.04 (H) 0.60 - 1.10 mg/dL    GFR MDRD Af Amer 28 (L) >60 mL/min/1.73m2    GFR MDRD Non Af Amer 23 (L) >60 mL/min/1.73m2         Assessment and Plan:  1. DJD (degenerative joint disease)     2. Knee pain     3. Bipolar disorder     4. Hypertension     5. Diabetes mellitus     6. Chronic Kidney Disease, Stage 4     7. Gout     8. Essential hypertension       DJD of multiple joints with chronic knee pain.  Pain lessened with scheduling with Tylenol.  She does ambulate with rolling walker independently.  Bipolar  disorder well-controlled with Abilify.  Pleasant chatty.  Significant crest like hospice.  No aids.  Hypertension satisfactory controlled.  Lower extremity edema well controlled with low-dose Lasix every other day.  Chronic kidney disease stage IV.  Creatinine 2.04.  We will follow-up with laboratory.  History of gout well controlled with allopurinol.      Electronically signed by: Kim Reynolds, CNP

## 2021-06-16 NOTE — PROGRESS NOTES
[facility]    Facility:   [facility] [[id number]]   Code Status: DNR/DNI      CHIEF COMPLAINT/REASON FOR VISIT:  Chief Complaint   Patient presents with     Review Of Multiple Medical Conditions     Type 2 diabetes, hypertension, bipolar depression, anemia of chronic disease, chronic kidney disease, lower extremity edema, DJD of the knees bilateral.       HISTORY:      HPI: Roseanna is a 86 y.o. female who resides here at the [facility] for multiple medical problems she does have type 2 diabetes and her last A1c was 6.6.  On the electronic medical records I do not seem to find that we are checking blood sugars at this time.  She does have chronic kidney kidney disease stage IV with a last creatinine 1.75 and she did have hyperkalemia which apparently was treated and returned to normal limits.  She does have bipolar depression is on Wellbutrin at this time and according to staff that seems to be stable DJD in her knees is treated with Tylenol and overall she is been doing okay up to this point.    Patient claims that she is doing fine and has no issues today she says she does not have any pain or pain in her knees is very tolerable at this time.  Her bipolar disorder seems to be stable at this time and she does not have any lower extremity edema at this time.  She has no new concerns.    History reviewed. No pertinent past medical history.          History reviewed. No pertinent family history.  Social History     Social History     Marital status:      Spouse name: N/A     Number of children: N/A     Years of education: N/A     Social History Main Topics     Smoking status: Former Smoker     Types: Cigarettes     Start date: 5/23/1949     Quit date: 5/23/1990     Smokeless tobacco: Never Used     Alcohol use No     Drug use: No     Sexual activity: Not Asked     Other Topics Concern     None     Social History Narrative         Review of Systems    Constitutional:        Patient denies any pain fevers chills nausea vomiting diarrhea change in vision hearing taste or smell weakness one side of the chest pain or shortness of breath.  She is hard of hearing but can hear me pretty well and answers questions appropriately at this time.  She is moving her bowels without difficulty and urinating without any urgency frequency or burning with urination.  According to staff they have no new concerns at this time.       .  Vitals:    03/21/18 0753   BP: 129/82   Pulse: 87   Resp: 23   Temp: 97.2  F (36.2  C)   SpO2: 97%       Physical Exam   Constitutional:   Morbidly obese female sitting up in chair who does not appear to be in acute distress.   HENT:   Head: Normocephalic and atraumatic.   Nose: Nose normal.   Mouth/Throat: No oropharyngeal exudate.   Eyes: Conjunctivae are normal. Right eye exhibits no discharge. Left eye exhibits no discharge.   Neck: Neck supple. No thyromegaly present.   Cardiovascular: Normal rate, regular rhythm and normal heart sounds.  Exam reveals no gallop.    No murmur heard.  Pulmonary/Chest: Effort normal and breath sounds normal. No respiratory distress. She has no wheezes.   Abdominal: Soft. Bowel sounds are normal. She exhibits distension. There is no tenderness. There is no rebound.   Musculoskeletal: She exhibits no edema or tenderness.   Neurological: She is alert. No cranial nerve deficit. She exhibits normal muscle tone.   Skin: Skin is warm and dry.   Psychiatric: She has a normal mood and affect. Her behavior is normal.         LABS: Potassium 1 3/8/2018 was 4.8.  A1c on 2/19/2018 was 6.6.  Also on 219 hemoglobin was 10.2 white count was 8.7 and platelets 190,000.  Sodium was 143 potassium was 5.3 creatinine is 1.75 GFR was 28 calcium was 8.8.      ASSESSMENT:      ICD-10-CM    1. Athlete's foot on right B35.3    2. Essential hypertension I10    3. Diabetes mellitus E11.9    4. Bilateral edema of lower extremity R60.0    5.  Chronic Kidney Disease, Stage 4 N18.4    6. Gout M10.9    7. Bipolar disorder F31.9        PLAN: Her athlete's foot on her right foot has pretty much resolved at this time and her blood pressure has been relatively normotensive.  Her A1c was okay at 6.6 however I would like her blood sugars checked more frequently.  I will recommend that for next NP visit.  Chronic kidney disease stage IV she will need a basic metabolic profile in about a month and continue to monitor above medical problems.  Her bipolar disorder seems to be well compensated at this time and staff will continue to monitor her for any changes.  No other changes to care plan at this time.      Total  minutes of which % was spent counseling and coordination of care of the above plan.    Electronically signed by: Sher Monaco DO

## 2021-06-16 NOTE — PROGRESS NOTES
Code Status:  DNR/DNI  Visit Type: Problem Visit     Facility:  [facility] [[id number]]         Facility Type:  (Long Term Care, LTC)    History of Present Illness: Roseanna Mcmahan is a 86 y.o. female who I am seeing today at the request of the nursing staff regarding rash to her feet. Pt with hxdiabetes type II, hypertension, bipolar depression, anemia of chronic disease, chronic kidney disease and LE edema. DJD of the knees. Pt has chronic LE edema. She wears slide on tennis shoes and does not wear socks. She has rash between toes with moisture. Nursing staff have been using Nystatin powder with sheep skin. The sheep skin stuck to skin and pulled some skin off. I did have them discontinue the sheep skin about 4 days ago. Today area less moist. 3rd toe of right foot with slight denuding. Slight edema. No redness or warmth. Pt denies any pain.     Active Ambulatory Problems     Diagnosis Date Noted     Cerumen Impaction      (Lower) Leg Localized Swelling Bilateral      Bipolar Disorder      Cellulitis      Type 2 Diabetes Mellitus      Chronic Kidney Disease With Benign Hypertension      Osteoarthritis Of The Knee      Chronic Kidney Disease, Stage 4      Bipolar I disorder, most recent episode (or current) depressed, mild 08/11/2014     Adjustment disorder with mixed anxiety and depressed mood 08/11/2014     Bipolar I disorder, most recent episode (or current) depressed, mild 10/13/2014     Bipolar I disorder, most recent episode (or current) depressed, in partial or unspecified remission 04/13/2015     Intertrigo 12/14/2015     Resolved Ambulatory Problems     Diagnosis Date Noted     No Resolved Ambulatory Problems     No Additional Past Medical History     Meds reviewed at facility.       No Known Allergies    Review of Systems   Refer to HPI.     Physical Exam  PHYSICAL EXAMINATION:  Vital signs:   Vitals:    03/14/18 1651   BP: 129/82   Pulse: 87   Resp: 23   Temp: 97.2  F (36.2  C)   SpO2:  97%       PHYSICAL EXAMINATION:  Vital signs:   Vitals:    03/14/18 1651   BP: 129/82   Pulse: 87   Resp: 23   Temp: 97.2  F (36.2  C)   SpO2: 97%     General: Awake, Alert, oriented x3, appropriately, follows simple commands, conversant. Pt sitting up in recliner.   HEENT:PERRLA, Pink conjunctiva, anicteric sclerae, moist oral mucosa.  Severe hard of hearing.  No aids.  EXTREMITIES: Good range of motion on both upper and lower extremities, DJD of mx joints no focal inflammation,Trace pedal edema, no calf tenderness. Right foot with slight moisture between the toes. 3rd toe with hammer like appearance. Slight denuded skin. Slightly swollen. No redness or warmth.   NEUROLOGIC: Intact, pulses palpable  PSYCHIATRIC: Cognition intact.     Labs:    Lab Results   Component Value Date    WBC 8.7 02/19/2018    HGB 10.2 (L) 02/19/2018    HCT 33.8 (L) 02/19/2018     (H) 02/19/2018     02/19/2018     Results for orders placed or performed in visit on 02/19/18   Basic Metabolic Panel   Result Value Ref Range    Sodium 143 136 - 145 mmol/L    Potassium 5.3 (H) 3.5 - 5.0 mmol/L    Chloride 108 (H) 98 - 107 mmol/L    CO2 27 22 - 31 mmol/L    Anion Gap, Calculation 8 5 - 18 mmol/L    Glucose 86 70 - 125 mg/dL    Calcium 8.8 8.5 - 10.5 mg/dL    BUN 34 (H) 8 - 28 mg/dL    Creatinine 1.75 (H) 0.60 - 1.10 mg/dL    GFR MDRD Af Amer 33 (L) >60 mL/min/1.73m2    GFR MDRD Non Af Amer 28 (L) >60 mL/min/1.73m2         Assessment and Plan:  1. Athlete's foot on right       We will continue with nystatin.  Avoid sheepskin.  Patient may need further treatment of her shoes.  We will follow-up in 1 week.        Electronically signed by: Kim Reynolds, JUANI

## 2021-06-17 NOTE — PROGRESS NOTES
Code Status:  DNR/DNI  Visit Type: Problem Visit     Facility:  [facility] [[id number]]         Facility Type:  (Long Term Care, LTC)    History of Present Illness: Roseanna Mcmahan is a 87 y.o. female who I am seeing today at the request of the nursing staff for follow up of rash to her feet. Pt with hxdiabetes type II, hypertension, bipolar depression, anemia of chronic disease, chronic kidney disease and LE edema. DJD of the knees. Pt has chronic LE edema. She wears slide on tennis shoes and does not wear socks.  She has been treated with topical nystatin powder both on her toes and treatment of her shoes.  We have discontinued the  Lambs wool  due to is taking to her toes.  She tells me she recently saw the podiatrist and the podiatrist nicked her skin.  I do not see this on exam.  However she does have splits between all toes on the right foot with maceration.  No redness or warmth.  Some peeling skin at the forefoot.      Active Ambulatory Problems     Diagnosis Date Noted     Cerumen Impaction      (Lower) Leg Localized Swelling Bilateral      Bipolar Disorder      Cellulitis      Type 2 Diabetes Mellitus      Chronic Kidney Disease With Benign Hypertension      Osteoarthritis Of The Knee      Chronic Kidney Disease, Stage 4      Bipolar I disorder, most recent episode (or current) depressed, mild 08/11/2014     Adjustment disorder with mixed anxiety and depressed mood 08/11/2014     Bipolar I disorder, most recent episode (or current) depressed, mild 10/13/2014     Bipolar I disorder, most recent episode (or current) depressed, in partial or unspecified remission 04/13/2015     Intertrigo 12/14/2015     Resolved Ambulatory Problems     Diagnosis Date Noted     No Resolved Ambulatory Problems     No Additional Past Medical History     Meds reviewed at facility.       No Known Allergies    Review of Systems   Refer to HPI.     Physical Exam  PHYSICAL EXAMINATION:  Vital signs:   Vitals:     04/02/18 2026   BP: 148/87   Pulse: 70   Resp: 14   Temp: 98.3  F (36.8  C)   SpO2: 97%       PHYSICAL EXAMINATION:  Vital signs:   Vitals:    04/02/18 2026   BP: 148/87   Pulse: 70   Resp: 14   Temp: 98.3  F (36.8  C)   SpO2: 97%     General: Awake, Alert, oriented x3, appropriately, follows simple commands, conversant. Pt sitting up in recliner.   HEENT:moist oral mucosa.  Severe hard of hearing.  No aids.  EXTREMITIES: Good range of motion on both upper and lower extremities, DJD of mx joints no focal inflammation,Trace pedal edema, no calf tenderness. Right foot with  moisture between the toes with slits. 3rd toe with hammer like appearance. Slight denuded skin. Slightly swollen. No redness or warmth.   NEUROLOGIC: Intact, pulses palpable  PSYCHIATRIC: Cognition intact.     Labs:    Lab Results   Component Value Date    WBC 8.7 02/19/2018    HGB 10.2 (L) 02/19/2018    HCT 33.8 (L) 02/19/2018     (H) 02/19/2018     02/19/2018     Results for orders placed or performed in visit on 02/19/18   Basic Metabolic Panel   Result Value Ref Range    Sodium 143 136 - 145 mmol/L    Potassium 5.3 (H) 3.5 - 5.0 mmol/L    Chloride 108 (H) 98 - 107 mmol/L    CO2 27 22 - 31 mmol/L    Anion Gap, Calculation 8 5 - 18 mmol/L    Glucose 86 70 - 125 mg/dL    Calcium 8.8 8.5 - 10.5 mg/dL    BUN 34 (H) 8 - 28 mg/dL    Creatinine 1.75 (H) 0.60 - 1.10 mg/dL    GFR MDRD Af Amer 33 (L) >60 mL/min/1.73m2    GFR MDRD Non Af Amer 28 (L) >60 mL/min/1.73m2         Assessment and Plan:  1. Athlete's foot on right     2. Bilateral edema of lower extremity     3. Diabetes mellitus       Patient with more sugars between the toes on the right foot with split maceration.  No redness or warmth.  He does have hammertoe on the third digit.  She wears flat on tennis shoes.  We have treated her with nystatin topically plus treating her shoes.  Today area is open.  We tried Lamisil previously however this was sticking.  She does need a drying  agent.  Will have him use calmoseptine.  Wash and dry twice a day thoroughly.  He tells me she has been soaking her feet.  We will discontinue and avoid this secondary to moisture.  She does have diabetes.  No signs or symptoms of infection currently.  Blood sugars satisfactory controlled.  Bilateral lower extremity edema chronic.  1+.  She does not wear compression.  I will have her seen by [facility] for diabetic footwear.        Electronically signed by: Kim Reynolds, CNP

## 2021-06-17 NOTE — PROGRESS NOTES
Correct pharmacy verified with patient and confirmed in snapshot? [x] yes []no    Charge captured ? [x] yes  [] no    Medications Phoned  to Pharmacy [] yes [x]no  Name of Pharmacist:  List Medications, including dose, quantity and instructions      Medication Prescriptions given to patient   [] yes  [x] no   List the name of the drug the prescription was written for.       Medications ordered this visit were e-scribed.  Verified by order class [x] yes  [] no    Medication changes or discontinuations were communicated to patient's pharmacy: [] yes  [x] no, none discontinued.    UA collected [] yes  [x] no    [place] Prescription Monitoring Program Reviewed? [] yes  [x] no, no medication to report on the [place] .    Referrals were made to:  NA    Future appointment was made: [x] yes  [] no    Dictation completed at time of chart check: [x] yes  [] no    I have checked the documentation for today s encounters and the above information has been reviewed and completed.

## 2021-06-17 NOTE — PROGRESS NOTES
Psychiatric  Progress Note    Date of visit: 4/9/2018         Discussion of Care and Treatment Recommendations:     This is a 87 y.o.  white female with bipolar disorder  She comes for this appointment accompanied by her daughter-in-law.  Patient , her daughter-in-law and I reviewed diagnosis and treatment plan and patient agrees with following recommendations:    1.Patient will take the medications as prescribed.   Psychiatric medications:  Abilify 5 mg every morning for mood stabilization.   Wellbutrin  mg every morning.  Patient will not stop taking medications or adjust them without consulting with the provider.  2.Patient will call with any problems between 2 visits.  3.Patient will go to the emergency room if not feeling safe , unable to function in the community, or if suicidal, homicidal or hearing voices or having paranoia.  4.Patient will abstain from drugs and alcohol.  Patient says she does not use any drugs or alcohol.  5.Patient will not drive if sedated on medications or under influence of any substance.  Patient says she does not drive.  6.Patient will not mix psychiatric medications with drugs and alcohol.   7.Patient will watch his diet and exercise.  8.Patient will see non psychiatric providers for non psychiatric disorders.  9. Next appointment in 3 months.         DIagnoses:       Bipolar disorder manic phase mild      Diabetes mellitus, chronic knee pain, newly discovered kidney insufficiency    Patient Active Problem List   Diagnosis     Cerumen Impaction     (Lower) Leg Localized Swelling Bilateral     Bipolar Disorder     Cellulitis     Type 2 Diabetes Mellitus     Chronic Kidney Disease With Benign Hypertension     Osteoarthritis Of The Knee     Chronic Kidney Disease, Stage 4     Bipolar I disorder, most recent episode (or current) depressed, mild     Adjustment disorder with mixed anxiety and depressed mood     Bipolar I disorder, most recent episode (or current)  depressed, mild     Bipolar I disorder, most recent episode (or current) depressed, in partial or unspecified remission     Intertrigo             Chief Complaint / Subjective:      Chief complaint: Irritable, stress because of bad relationship between her daughter and her son,    History of Present Illness:Roseanna says that her medications are working.  She says that she has some problems with her hearing aid she hears better.,  But it looks like device needs some adjustment.  She says that she is stressed out with better relationship between her daughter and her son.  She says that they do not get along.  Both of them visits her regularly.  She has good relationship with both of them, but it hurts her that they do not have good relationship with each other.  She says her daughter is trying to renew her disability driving permit.  She called people at [facility].  She says that she is afraid that it could make some of the relationships bad.  She says that she likes people there and they take good care of her and she does not want to cause any problems.  She says that she is under stress from the woman who shows the room with her at [facility].  She says that woman constantly complains about something and the light has to be on because of her and that wakes her up.  She says that she also pushes the help button all the time and that is stressful.  She denies suicidal and homicidal ideas.  She denies delusions and hallucinations.  Appetite is good.  She says her energy is so-so.  She says sometimes she feels tired.  Concentration appears little decreased.  She is alert and oriented to self, place and time.  She prefers no medication change.  Her daughter-in-law says that she noticed also some irritability.  Patient says she is okay and she does not got into altercation with anyone and she thinks she can handle as her symptoms at this time.  She says that she is dealing with the stress and she does not want  any changes for now.  She agrees to come for appointment in 3 months.    Past psychiatric history:  Psychiatric hospitalizations: One time 13 years ago  Suicide attempt: No  ECT: No  Chemical dependency history: No    Family history:  Psychiatric: No  Suicide: No  Chemical dependency:  no    Social history:  [place]  She finished 10th grade.  She is .  Her  is in a nursing home.  She had 4 children.  3 are living now.  One  in .  She lives in a nursing home.  Her children are supportive of her.    Mental Status Examination:   General: adequate hygiene, cooperative   Mentation: ×3  Speech: loud because she is adjusting to hearing aid   Language: intact   Thought process: [place]  Thought content: devoid of delusions and hallucinations   Suicidal thoughts: absent   Homicidal thoughts: absent   Associations: connected   Affect: Irritable  Mood: Mood lability  Intellectual functioning: within normal limits   Memory: within normal limits   Fund of knowledge: sufficient systems is negative for: General, eyes, ears, nose, throat, neck, respiratory, cardiovascular, gastrointestinal, genitourinary, meniscal skeletal, neurological, hematological and endocrine system.  Attention and concentration: Normal  Gait: Using a walker    Psychomotor activity: mild agitation  Muscles: No atrophy, no abnormal movements   InSight and judgment: Fair     Medication adjustment: Patient and her daughter-in-law prefers no change at this time.  We will assess next visit.  They will call with problems.  Medication adherence: compliant  Medication side effects: absent  Information about medications: Side effects, benefits and alternative treatments discussed and patient and her daughter-in-law and they agree.    Psychotherapy: Supportive therapy regarding above issues    Education: Diet, exercise, abstinence from drugs and alcohol, patient will not drive if sedated and medications or   "under influence of any substance    Lab Results: Personally reviewed  Lab Results   Component Value Date    WBC 8.7 02/19/2018    HGB 10.2 (L) 02/19/2018    HCT 33.8 (L) 02/19/2018     02/19/2018    CHOL 130 09/17/2015    TRIG 123 09/17/2015    HDL 49 09/17/2015    ALT 7 09/17/2015    AST 12 09/17/2015     02/19/2018    K 4.8 03/08/2018     (H) 02/19/2018    CREATININE 1.75 (H) 02/19/2018    BUN 34 (H) 02/19/2018    CO2 27 02/19/2018    TSH 1.54 09/17/2015    HGBA1C 6.6 (H) 02/19/2018    MICROALBUR 2.12 (H) 02/06/2014       Vital signs:  Vitals:    04/09/18 1407   BP: 142/65   Pulse: 82   Resp: 20   Temp: 97.8  F (36.6  C)         Allergies: Review of patient's allergies indicates no known allergies.         Medications:       Current Outpatient Prescriptions   Medication Sig Note     acetaminophen (TYLENOL) 500 MG tablet Take 1,000 mg by mouth 2 (two) times a day.      acetaminophen 500 mg coapsule Take 500 mg by mouth every 4 (four) hours as needed for fever.       allopurinol (ZYLOPRIM) 100 MG tablet Take 100 mg by mouth daily.  5/23/2016: Received from: External Pharmacy     ARIPiprazole (ABILIFY) 5 MG tablet Take 1 tablet (5 mg total) by mouth every morning.      blood sugar diagnostic Strp Use As Directed 4 (four) times a day. TEST 4 TIMES A DAY ON MON and THURS and PRN  Accu-chek Silvana strips      buPROPion (WELLBUTRIN XL) 150 MG 24 hr tablet Take 1 tablet (150 mg total) by mouth every morning.      furosemide (LASIX) 20 MG tablet Take 10 mg by mouth every morning.       glucose 4 GM chewable tablet Chew 16 g daily as needed for low blood sugar.      insulin syringe-needle U-100 (INSULIN SYRINGE) 1/2 mL 30 x 5/16\" Syrg Use 1 unit marking on U-100 syringe As Directed 4 (four) times a day.      LANTUS 100 unit/mL injection Inject 18 Units under the skin at bedtime.  5/23/2016: Received from: External Pharmacy     nystatin (MYCOSTATIN) powder Apply 1 application topically 3 (three) times a day " as needed.       nystatin (MYCOSTATIN) powder Apply 1 application topically daily.      triamcinolone (KENALOG) 0.1 % cream 1 application 2 (two) times a day as needed.  9/19/2016: Received from: External Pharmacy            Review of Systems:    As per history of present illness, otherwise reminder of review of  systems is negative for: General, eyes, ears, nose, throat, neck, respiratory, cardiovascular, gastrointestinal, genitourinary, meniscal skeletal, neurological, hematological and endocrine system.    Coordination of Care:   More than 25 minutes spent on this visit  with more than 50% of time spent on coordination of care including: Reviewing the chart, coordinating care with the nurse, reviewing and fillling out  facility forms, psychoeducation, entering orders and preparing documentation for the visit, providing supportive therapy regarding above issues.    This note is created with  the help of dictation system.  All grammatical and typing errors or context distortion are  unintentional and inherent to software.    Flor Barrios MD

## 2021-06-17 NOTE — PROGRESS NOTES
Reason for visit - Follow-up, doing good.  Sleep - no issue  Depression - none  Anxiety - none  Panic attacks - none  SI/HI - no  Therapist - no  Side effects from medication - not that she is aware of, but fingers on the left hand look very sore.    [place] on the [place] .

## 2021-06-17 NOTE — PROGRESS NOTES
Code Status:  DNR/DNI  Visit Type: Problem Visit     Facility:  [facility] [[id number]]         Facility Type:  (Long Term Care, LTC)    History of Present Illness: Roseanna Mcmahan is a 87 y.o. female who I am seeing today at the request of the nursing staff.Pt with hxdiabetes type II, hypertension, bipolar depression, anemia of chronic disease, chronic kidney disease and LE edema. DJD of the knees. Pt has chronic LE edema.  Patient with increased moisture between the toes on the right foot.  Was treated with antifungals with no relief.  We are now using calmoseptine.  Area is improving.  Her family did buy her new shoes.  Only trace lower extremity edema.  Nursing staff also reports somewhat of a pink and follow-up appearance of the fingers.  I did note today that she has been picking around her cuticles.  No redness.  No signs or symptoms of infection.    Active Ambulatory Problems     Diagnosis Date Noted     Cerumen Impaction      (Lower) Leg Localized Swelling Bilateral      Bipolar Disorder      Cellulitis      Type 2 Diabetes Mellitus      Chronic Kidney Disease With Benign Hypertension      Osteoarthritis Of The Knee      Chronic Kidney Disease, Stage 4      Bipolar I disorder, most recent episode (or current) depressed, mild 08/11/2014     Adjustment disorder with mixed anxiety and depressed mood 08/11/2014     Bipolar I disorder, most recent episode (or current) depressed, mild 10/13/2014     Bipolar I disorder, most recent episode (or current) depressed, in partial or unspecified remission 04/13/2015     Intertrigo 12/14/2015     Resolved Ambulatory Problems     Diagnosis Date Noted     No Resolved Ambulatory Problems     No Additional Past Medical History     Meds reviewed at facility.       No Known Allergies    Review of Systems   Refer to HPI.     Physical Exam  PHYSICAL EXAMINATION:  Vital signs:   Vitals:    04/11/18 2123   BP: 148/67   Pulse: 78   Resp: 16   Temp: 98  F (36.7  C)    SpO2: 98%       PHYSICAL EXAMINATION:  Vital signs:   Vitals:    04/11/18 2123   BP: 148/67   Pulse: 78   Resp: 16   Temp: 98  F (36.7  C)   SpO2: 98%     General: Awake, Alert, oriented x3, appropriately, follows simple commands, conversant. Pt sitting up in recliner.   HEENT:moist oral mucosa.  Severe hard of hearing.  No aids.  EXTREMITIES: Good range of motion on both upper and lower extremities, DJD of mx joints no focal inflammation,Trace pedal edema, no calf tenderness. Right foot with  moisture between the toes with slits. 3rd toe with hammer like appearance.  Toes are improving.  She has new shoes.  Second and third digit of the right hand slightly pink around the cuticles.  Patient has been picking.  Biting his skin.  No signs or symptoms of infection.   NEUROLOGIC: Intact, pulses palpable  PSYCHIATRIC: Cognition intact.       Assessment and Plan:  1. Athlete's foot on right       Patient with recent athlete's foot on the right foot.  She does have chronic lower extremity edema.  She was first treated with antifungals with no relief.  Now being treated with calmoseptine.  Area was moist.  Slit healing.  I will have nursing staff at [facility].  Family did buy new shoes.  Edema only trace on today's visit.  She also has excoriation around the cuticles on the right finger second and third digits secondary to biting and picking and cuticle skin.  No signs or symptoms of infection.  Will continue to monitor.          Electronically signed by: Kim Reynolds, JUANI

## 2021-06-18 NOTE — LETTER
Letter by Kim Reynolds CNP at      Author: Kim Reynolds CNP Service: -- Author Type: --    Filed:  Encounter Date: 1/3/2019 Status: (Other)         Patient: Roseanna Mcmahan   MR Number: [id number]   YOB: 1931   Date of Visit: 1/3/2019     Code Status:  DNR/DNI  Visit Type: Problem Visit     Facility:  [facility] [[id number]]         Facility Type:  (Long Term Care, LTC)    History of Present Illness: Roseanna Mcmahan is a 87 y.o. female who I am seeing today at the request of the nurses development of rash again to right foot.  Pt with hx of diabetes type II, hypertension, bipolar depression, anemia of chronic disease, chronic kidney disease and LE edema. DJD of the knees. Pt right foot infection and non healing rash had healed. She was seen at the vascular center. She is currently only receiving moisturizing cream. She has mild development of rash to forefoot. No pain. No swelling. No warmth. She tells me she recently had GI illness including nausea, vomiting and diarrhea. Symptoms now resolved.          Active Ambulatory Problems     Diagnosis Date Noted   ? Cerumen Impaction    ? (Lower) Leg Localized Swelling Bilateral    ? Bipolar Disorder    ? Cellulitis    ? Type 2 Diabetes Mellitus    ? Chronic Kidney Disease With Benign Hypertension    ? Osteoarthritis Of The Knee    ? Chronic Kidney Disease, Stage 4    ? Bipolar I disorder, most recent episode (or current) depressed, mild 08/11/2014   ? Adjustment disorder with mixed anxiety and depressed mood 08/11/2014   ? Bipolar I disorder, most recent episode (or current) depressed, mild 10/13/2014   ? Bipolar I disorder, most recent episode (or current) depressed, in partial or unspecified remission 04/13/2015   ? Intertrigo 12/14/2015     Resolved Ambulatory Problems     Diagnosis Date Noted   ? No Resolved Ambulatory Problems     Past Medical History:   Diagnosis Date   ? (Lower) Leg Localized Swelling Bilateral     ? Adjustment disorder with mixed anxiety and depressed mood 8/11/2014   ? Bipolar Disorder    ? Bipolar I disorder, most recent episode (or current) depressed, in partial or unspecified remission 4/13/2015   ? Bipolar I disorder, most recent episode (or current) depressed, mild 8/11/2014   ? Cellulitis    ? Cerumen Impaction    ? Chronic Kidney Disease With Benign Hypertension    ? Chronic Kidney Disease, Stage 4    ? Intertrigo 12/14/2015   ? Osteoarthritis Of The Knee    ? Type 2 Diabetes Mellitus      Meds reviewed at facility.       No Known Allergies    Review of Systems   No fevers or chills. No headache, lightheadedness or dizziness. No SOB, chest pains or palpitations. Pt reports recent GI illness including nausea, vomiting and diarrhea. Currently no nausea, vomiting, constipation or diarrhea. No dysuria, frequency, burning or pain with urination.She denies any pain in foot.  Otherwise review of systems are negative.         PHYSICAL EXAMINATION:  General: Awake, Alert, sitting up in recliner.   VS: /77, heart rate 86, respirations 16, temperature 96, O2 sat 93% there..  Weight 189.6 pounds.  HEENT:moist oral mucosa.  Severe hard of hearing.  No aids.  She does wear dentures.  CARDIOVASCULAR: S1-S2 without murmur gallop.  RESPIRATORY: Wheezes rales or rhonchi.  ABDOMEN: Obese.  Nontender with positive bowel sounds.  EXTREMITIES: Good range of motion on both upper and lower extremities, trace pedal edema, no calf tenderness.   SKIN: Right foot with beginnings or rash to fore foot. No warmth. No edema. Foot dry. She does have scalying.   NEUROLOGIC: Intact, pulses palpable  PSYCHIATRIC: Cognition intact.Pleasant affect.      Assessment and Plan:  1. Diabetic foot infection (H)     2. Gastrointestinal symptoms         Previous diabetic foot nfection with tinea pedis that had resoved.  She was seen by the vascular clinic.  She underwent a clinical debridement as well and Hibiclens and TMC cream  topically. She ha been receiving moisturizing cream. Today with slight rash again on forefoot. I will have staff reinstate previous orders received from vascular center. Recent GI illness. Symptoms resolved. She told me she is drinking and eat good now.         Electronically signed by: Kim Reynolds, CNP

## 2021-06-18 NOTE — LETTER
"Letter by Kim Reynolds CNP at      Author: Kim Reynolds CNP Service: -- Author Type: --    Filed:  Encounter Date: 2/13/2019 Status: (Other)         Patient: Roseanna Mcmahan   MR Number: [id number]   YOB: 1931   Date of Visit: 2/13/2019     Code Status:  DNR/DNI  Visit Type: Problem Visit     Facility:  [facility] [[id number]]         Facility Type:  (Long Term Care, LTC)    History of Present Illness: Roseanna Mcmahan is a 87 y.o. female who I am seeing today at the request of the nurses.  Pt with hx of diabetes type II, hypertension, bipolar depression, anemia of chronic disease, chronic kidney disease and LE edema. DJD of the knees. Nursing reporting increased confusion over the last 2 days with pt appearing more sleepy. She also has been spitting out her meds behind her bed and chair. Today pt sitting up in bedside chair. She is very sleepy but arouses. She denies any fever or chills. We discussed possible bladder infection. She was treated for UTI back in December. She denies any symptoms. She reports she was up all night due to her room mate. She tells me \"she does not want to take all those pills.\" I did review her med list with her. Pt does have hx of bipolar disorder. She continues on Abilify and Wellbutrin.     Active Ambulatory Problems     Diagnosis Date Noted   ? Cerumen Impaction    ? (Lower) Leg Localized Swelling Bilateral    ? Bipolar Disorder    ? Cellulitis    ? Type 2 Diabetes Mellitus    ? Chronic Kidney Disease With Benign Hypertension    ? Osteoarthritis Of The Knee    ? Chronic Kidney Disease, Stage 4    ? Bipolar I disorder, most recent episode (or current) depressed, mild 08/11/2014   ? Adjustment disorder with mixed anxiety and depressed mood 08/11/2014   ? Bipolar I disorder, most recent episode (or current) depressed, mild 10/13/2014   ? Bipolar I disorder, most recent episode (or current) depressed, in partial or unspecified " remission 04/13/2015   ? Intertrigo 12/14/2015     Resolved Ambulatory Problems     Diagnosis Date Noted   ? No Resolved Ambulatory Problems     Past Medical History:   Diagnosis Date   ? (Lower) Leg Localized Swelling Bilateral    ? Adjustment disorder with mixed anxiety and depressed mood 8/11/2014   ? Bipolar Disorder    ? Bipolar I disorder, most recent episode (or current) depressed, in partial or unspecified remission 4/13/2015   ? Bipolar I disorder, most recent episode (or current) depressed, mild 8/11/2014   ? Cellulitis    ? Cerumen Impaction    ? Chronic Kidney Disease With Benign Hypertension    ? Chronic Kidney Disease, Stage 4    ? Intertrigo 12/14/2015   ? Osteoarthritis Of The Knee    ? Type 2 Diabetes Mellitus      Meds reviewed at facility.       No Known Allergies    Review of Systems   No fevers or chills. No headache, lightheadedness or dizziness. No SOB, chest pains or palpitations. No nausea, vomiting, constipation or diarrhea. No dysuria, frequency, burning or pain with urination.She denies any pain in foot. She reports being up all night due to her room mate. Otherwise review of systems are negative.         PHYSICAL EXAMINATION:  General: Sleepy, she does arouse. She is sitting up in her recliner.    VS: /81, heart rate 70, respirations 16 O2 sat 96%.  Weight 186 pounds.  HEENT:moist oral mucosa.  Severe hard of hearing.  No aids.  She does wear dentures.  CARDIOVASCULAR: S1-S2 without murmur gallop.  RESPIRATORY: NoWheezes rales or rhonchi.  ABDOMEN: Obese.  Nontender with positive bowel sounds.  EXTREMITIES: Good range of motion on both upper and lower extremities, trace pedal edema, no calf tenderness.   SKIN: Right foot with mild scaly dryness. No redness or warmth.   NEUROLOGIC: Intact, pulses palpable  PSYCHIATRIC: Mild cognitive impairment.       Assessment and Plan:  1. Confusion     2. Bipolar affective disorder, remission status unspecified (H)       Pt with recent increased  confusion. Obtain UA/UC to rule out bladder infection.She does have bipolar disorder. She continues on Abilify and Wellbutrin. She was up all night. Will need to monitor for signs of laura. DM satisfactory controlled. Will follow up with CBC, BMP and hemoglobin A1c in am.     Electronically signed by: Kim Reynolds, JUANI

## 2021-06-18 NOTE — PROGRESS NOTES
Code Status:  DNR/DNI  Visit Type: Problem Visit     Facility:  [facility] [[id number]]         Facility Type:  (Long Term Care, LTC)    History of Present Illness: Roseanna Mcmahan is a 87 y.o. female who I am seeing today for follow-up of recent UTI.  Pt with hxdiabetes type II, hypertension, bipolar depression, anemia of chronic disease, chronic kidney disease and LE edema. DJD of the knees.  Patient with recent increased lethargy and confusion.  Her UC was positive for greater than 1000 E. coli.  She was treated with oral antibiotics.  She has completed her antibiotics.  She denies any dysuria frequency burning or pain on exam.  Fluids have been encouraged.  She has underlying diabetes.  She is continued with some moisture consistent with athlete's foot to her right foot.  She does have significant hammertoe there.  On today's exam is very moist with fungal pungent odor.  Significant peeling and maceration noted.  She did receive new diabetic shoes.  She continued with topical treatment.    Active Ambulatory Problems     Diagnosis Date Noted     Cerumen Impaction      (Lower) Leg Localized Swelling Bilateral      Bipolar Disorder      Cellulitis      Type 2 Diabetes Mellitus      Chronic Kidney Disease With Benign Hypertension      Osteoarthritis Of The Knee      Chronic Kidney Disease, Stage 4      Bipolar I disorder, most recent episode (or current) depressed, mild 08/11/2014     Adjustment disorder with mixed anxiety and depressed mood 08/11/2014     Bipolar I disorder, most recent episode (or current) depressed, mild 10/13/2014     Bipolar I disorder, most recent episode (or current) depressed, in partial or unspecified remission 04/13/2015     Intertrigo 12/14/2015     Resolved Ambulatory Problems     Diagnosis Date Noted     No Resolved Ambulatory Problems     No Additional Past Medical History     Meds reviewed at facility.       No Known Allergies    Review of Systems   Refer to HPI.      PHYSICAL EXAMINATION:  Vital signs:   /80, heart rate 81, respirations 18, temperature 97.1.  General: Awake, Alert, oriented x3, appropriately, follows simple commands, conversant. Pt sitting up in recliner.  Appears somewhat tired.  HEENT:moist oral mucosa.  Severe hard of hearing.  No aids.  EXTREMITIES: Good range of motion on both upper and lower extremities, DJD of mx joints no focal inflammation,Trace pedal edema, no calf tenderness. Right foot with significant hammertoe of the second digit.  Toes moist with fungal pungent odor.  Redness and peeling with maceration noted.   NEUROLOGIC: Intact, pulses palpable  PSYCHIATRIC: Cognition intact.       Assessment and Plan:  1. Urinary tract infection     2. Athlete's foot on right       Recent UTI culturing E. coli.  She did complete her oral antibiotics.  Denies any frequency burning or pain.  Will encourage fluids.  Continues with fungal rash to right foot with significant hammertoe.  She is receiving topical treatment with count well.  Will also mix with antifungal powder.  Will use a toe separator and or gauze.  She did receive new diabetic shoes.        Electronically signed by: Kim Reynolds, CNP

## 2021-06-18 NOTE — LETTER
Letter by Kim Reynolds CNP at      Author: Kim Reynolds CNP Service: -- Author Type: --    Filed:  Encounter Date: 2/18/2019 Status: (Other)         Patient: Roseanna Mcmahan   MR Number: [id number]   YOB: 1931   Date of Visit: 2/18/2019     Code Status:  DNR/DNI  Visit Type: Problem Visit     Facility:  [facility] [[id number]]         Facility Type:  (Long Term Care, LTC)    History of Present Illness: Roseanna Mcmahan is a 87 y.o. female who I am seeing today for follow up of recent UTI.   Pt with hx of diabetes type II, hypertension, bipolar depression, anemia of chronic disease, chronic kidney disease and LE edema. DJD of the knees. Nursing reporting increased confusion over the last 2 days with pt appearing more sleepy.  Recent increased behaviors as well as confusion.  UA UC obtained.  UC showed no growth however UA showed trace blood, trace leukocytes and moderate bacteria.  Patient also had low-grade temp.  She was treated with nitrofurantoin.  She continues on this.  Today with low-grade temp in the records.  Heart rate 120.  Patient denies any dysuria.  I find her ambulating in the hallway.  She says she is drinking well.  Nursing staff reports that she was wandering in and out of other resident's rooms earlier this a.m.  This is not like her.      Active Ambulatory Problems     Diagnosis Date Noted   ? Cerumen Impaction    ? (Lower) Leg Localized Swelling Bilateral    ? Bipolar Disorder    ? Cellulitis    ? Type 2 Diabetes Mellitus    ? Chronic Kidney Disease With Benign Hypertension    ? Osteoarthritis Of The Knee    ? Chronic Kidney Disease, Stage 4    ? Bipolar I disorder, most recent episode (or current) depressed, mild 08/11/2014   ? Adjustment disorder with mixed anxiety and depressed mood 08/11/2014   ? Bipolar I disorder, most recent episode (or current) depressed, mild 10/13/2014   ? Bipolar I disorder, most recent episode (or current)  depressed, in partial or unspecified remission 04/13/2015   ? Intertrigo 12/14/2015     Resolved Ambulatory Problems     Diagnosis Date Noted   ? No Resolved Ambulatory Problems     Past Medical History:   Diagnosis Date   ? (Lower) Leg Localized Swelling Bilateral    ? Adjustment disorder with mixed anxiety and depressed mood 8/11/2014   ? Bipolar Disorder    ? Bipolar I disorder, most recent episode (or current) depressed, in partial or unspecified remission 4/13/2015   ? Bipolar I disorder, most recent episode (or current) depressed, mild 8/11/2014   ? Cellulitis    ? Cerumen Impaction    ? Chronic Kidney Disease With Benign Hypertension    ? Chronic Kidney Disease, Stage 4    ? Intertrigo 12/14/2015   ? Osteoarthritis Of The Knee    ? Type 2 Diabetes Mellitus      Meds reviewed at facility.       No Known Allergies    Review of Systems   Low-grade temp noted in the records. No headache, lightheadedness or dizziness. No SOB, chest pains or palpitations. No nausea, vomiting, constipation or diarrhea.  Patient denies any dysuria, frequency, burning or pain with urination. Otherwise review of systems are negative.         PHYSICAL EXAMINATION:  General: Patient seen ambulating in the hallway.  Pleasant.    VS: /83, P 70, R 20, T 99.0, O2 sat 95% RA.  Weight 186 pounds.  HEENT:moist oral mucosa.  Severe hard of hearing.  No aids.  She does wear dentures.  ABDOMEN: Obese.    EXTREMITIES: Good range of motion on both upper and lower extremities, trace pedal edema, no calf tenderness.  Ambulating with a rolling walker.  NEUROLOGIC: Intact  PSYCHIATRIC: Mild cognitive impairment.  She does remember who I am.    Assessment and Plan:  1. Confusion     2. Urinary tract infection with hematuria, site unspecified       Patient with recent increased confusion.  UA UC was obtained.  UC showed no growth.  Was treated for UTI secondary to moderate bacteria seen on urinalysis as well as patient had a low-grade temperature.   She continues on nitrofurantoin.  She denies any dysuria burning or pain.  However, later noted in the records that she continues with low-grade temp and now has tachycardia.  We will repeat a UA UC.  I will also give her 1 g of IM Rocephin with lidocaine in the event she is trying to go septic.  She does have underlying chronic kidney disease.  Creatinine stable at 1.9.  Slightly dry.  Will encourage fluids.  Will obtain CBC in the a.m.       Electronically signed by: Kim Reynolds, CNP

## 2021-06-19 NOTE — PROGRESS NOTES
Code Status:  DNR/DNI  Visit Type: Problem Visit     Facility:  [facility] [[id number]]         Facility Type:  (Long Term Care, LTC)    History of Present Illness: Roseanna Mcmahan is a 87 y.o. female who I am seeing today at the request the nursing staff.  Pt with hx of diabetes type II, hypertension, bipolar depression, anemia of chronic disease, chronic kidney disease and LE edema. DJD of the knees.  Patient with chronic moisture between the toes on the right foot and top of the right foot at the crease between the forefoot and toes.  We have tried topical antifungal as well as barrier cream and antifungal mixed together.  Area will resolve then return.  Patient does have history of diabetes.  Blood sugars mostly satisfactory controlled.  She has new diabetic shoes.  Hammertoe on the right foot.    Active Ambulatory Problems     Diagnosis Date Noted     Cerumen Impaction      (Lower) Leg Localized Swelling Bilateral      Bipolar Disorder      Cellulitis      Type 2 Diabetes Mellitus      Chronic Kidney Disease With Benign Hypertension      Osteoarthritis Of The Knee      Chronic Kidney Disease, Stage 4      Bipolar I disorder, most recent episode (or current) depressed, mild 08/11/2014     Adjustment disorder with mixed anxiety and depressed mood 08/11/2014     Bipolar I disorder, most recent episode (or current) depressed, mild 10/13/2014     Bipolar I disorder, most recent episode (or current) depressed, in partial or unspecified remission 04/13/2015     Intertrigo 12/14/2015     Resolved Ambulatory Problems     Diagnosis Date Noted     No Resolved Ambulatory Problems     No Additional Past Medical History     Meds reviewed at facility.       No Known Allergies    Review of Systems   Patient denies any pain in her foot.  No itching.  Swelling less than usual.    PHYSICAL EXAMINATION:  Vital signs:   BP /78, heart rate 71, respirations 22, temperature 97.4, O2 sat 99% on room  air.  General: Awake, Alert, oriented x3, appropriately, follows simple commands, conversant. Pt sitting up in recliner.    HEENT:moist oral mucosa.  Severe hard of hearing.  No aids.  EXTREMITIES: Good range of motion on both upper and lower extremities, DJD of mx joints no focal inflammation,Trace pedal edema, no calf tenderness. Right foot with significant hammertoe of the second digit.  Toes moist with blisteredlike appearance.Blistering red skin over the top of the forefoot where it meets the toes.  Moderate serous drainage.  No odor.  No breakdown on the left.  She does have diabetic shoes on.  NEUROLOGIC: Intact, pulses palpable  PSYCHIATRIC: Cognition intact.  Very chatty.  Pleasant affect.      Assessment and Plan:  1. Skin ulcer of right foot, limited to breakdown of skin (H)     2. Bilateral edema of lower extremity     3. Diabetes mellitus (H)       Patient with recurrent breakdown of the right foot.  Blistery red skin between the toes as well as the top of the forefoot where it meets the toes.  Patient denies any pain.  Moderate serous drainage.  We have tried antifungal topical treatment as well as treating her shoes.  She now has new diabetic shoes.  We have also used antifungal mixed with barrier cream.  Area will heal and then have recurrent break down.  She does have significant hammertoe.  Trace lower extremity edema.  Diabetes mostly satisfactory controlled.  Will attempt to use Silvadene and see if we can dry the area.  No breakdown on the left foot.  Will continue weaving gauze between the toes.        Electronically signed by: Kim Reynolds, JUANI

## 2021-06-19 NOTE — PROGRESS NOTES
Code Status:  DNR/DNI  Visit Type: Problem Visit     Facility:  [facility] [[id number]]         Facility Type:  (Long Term Care, LTC)    History of Present Illness: Roseanna Mcmahan is a 87 y.o. female who I am seeing today for follow-up of right foot wounds.  Pt with hx of diabetes type II, hypertension, bipolar depression, anemia of chronic disease, chronic kidney disease and LE edema. DJD of the knees.  Patient with chronic moisture between the toes on the right foot and top of the right foot at the crease between the forefoot and toes.   I had recently discontinued her antifungal treatment and tried Silvadene over the last week and a half.  Today area very red.  Very moist with scaling and peeling.  She does report itching.  Appears more fungal in appearance.  She does have a history of diabetes.  Blood sugars are mostly satisfactory controlled.  He does have diabetic shoes.  Significant hammertoe noted on the right foot as well.  She does spend a lot of time outside in the hot sun.      Active Ambulatory Problems     Diagnosis Date Noted     Cerumen Impaction      (Lower) Leg Localized Swelling Bilateral      Bipolar Disorder      Cellulitis      Type 2 Diabetes Mellitus      Chronic Kidney Disease With Benign Hypertension      Osteoarthritis Of The Knee      Chronic Kidney Disease, Stage 4      Bipolar I disorder, most recent episode (or current) depressed, mild 08/11/2014     Adjustment disorder with mixed anxiety and depressed mood 08/11/2014     Bipolar I disorder, most recent episode (or current) depressed, mild 10/13/2014     Bipolar I disorder, most recent episode (or current) depressed, in partial or unspecified remission 04/13/2015     Intertrigo 12/14/2015     Resolved Ambulatory Problems     Diagnosis Date Noted     No Resolved Ambulatory Problems     No Additional Past Medical History     Meds reviewed at facility.       No Known Allergies    Review of Systems   Patient denies any  pain in her foot.  Reports increased itching of the right foot. Swelling less than usual.    PHYSICAL EXAMINATION:  Vital signs:   /97, heart rate 71, respirations 20, O2 sat 97% on room air, temperature 97.1, current weight 191.2 pounds.  General: Awake, Alert, oriented x3, appropriately, follows simple commands, conversant. Pt sitting up in recliner.    HEENT:moist oral mucosa.  Severe hard of hearing.  No aids.  She does wear dentures.  EXTREMITIES: Good range of motion on both upper and lower extremities, DJD of mx joints no focal inflammation,Trace pedal edema, no calf tenderness. Right foot with significant hammertoe of the second digit.  Toes moist with blisteredlike appearance.scaling and peeling noted.  Moderate serous drainage.  Fungal odor.  No breakdown on the left.  She does have diabetic shoes on.  NEUROLOGIC: Intact, pulses palpable  PSYCHIATRIC: Cognition intact.Pleasant affect.      Assessment and Plan:  1. Skin ulcer of right foot, limited to breakdown of skin (H)     2. Diabetes mellitus (H)     3. Tinea pedis       Continued breakdown to the right foot secondary to tinea pedis.  I have tried her an antifungal originally with no improvement.  Had discontinued this and attempted to use Silvadene.  No improvement today.  Increased scaling and peeling with redness.  Fungal odor present.  She does have diabetes.  Diabetic shoes and pulse.  Blood sugar satisfactory controlled.  Will treat with oral Diflucan times 3 days as well and topical Lamisil.  Continue with gauze dressing to wick away moisture.        Electronically signed by: Kim Reynolds, JUANI

## 2021-06-19 NOTE — LETTER
Letter by Kim Reynolds CNP at      Author: Kim Reynolds CNP Service: -- Author Type: --    Filed:  Encounter Date: 7/15/2019 Status: (Other)         Patient: Roseanna Mcmahan   MR Number: [id number]   YOB: 1931   Date of Visit: 7/15/2019     Code Status:  DNR/DNI  Visit Type: Review Of Multiple Medical Conditions     Facility:  [facility] [[id number]]         Facility Type:  (Long Term Care, LTC)    History of Present Illness: Roseanna Mcmahan is a 88 y.o. female who I am seeing today for review of chronic medical conditions.   Pt with hx of diabetes type II, hypertension, bipolar depression, anemia of chronic disease, chronic kidney disease and LE edema. DJD of the knees. Pt continues to ambulate with rolling walker. She is returning from music in the chapel today. Pt with redness under her breast, receiving nystatin powder. Pt with hx of fungal infection to her feet. Rash now resolved. She continues in moisture wicking socks. Pt recently seen by in house eye doctor. Pt started on Latanoprost eye drops at . Today pt denies any pain.       Active Ambulatory Problems     Diagnosis Date Noted   ? Cerumen Impaction    ? (Lower) Leg Localized Swelling Bilateral    ? Bipolar Disorder    ? Cellulitis    ? Type 2 Diabetes Mellitus    ? Chronic Kidney Disease With Benign Hypertension    ? Osteoarthritis Of The Knee    ? Chronic Kidney Disease, Stage 4    ? Bipolar I disorder, most recent episode (or current) depressed, mild 08/11/2014   ? Adjustment disorder with mixed anxiety and depressed mood 08/11/2014   ? Bipolar I disorder, most recent episode (or current) depressed, mild 10/13/2014   ? Bipolar I disorder, most recent episode (or current) depressed, in partial or unspecified remission 04/13/2015   ? Intertrigo 12/14/2015     Resolved Ambulatory Problems     Diagnosis Date Noted   ? No Resolved Ambulatory Problems     Past Medical History:   Diagnosis Date   ?  (Lower) Leg Localized Swelling Bilateral    ? Adjustment disorder with mixed anxiety and depressed mood 8/11/2014   ? Bipolar Disorder    ? Bipolar I disorder, most recent episode (or current) depressed, in partial or unspecified remission 4/13/2015   ? Bipolar I disorder, most recent episode (or current) depressed, mild 8/11/2014   ? Cellulitis    ? Cerumen Impaction    ? Chronic Kidney Disease With Benign Hypertension    ? Chronic Kidney Disease, Stage 4    ? Intertrigo 12/14/2015   ? Osteoarthritis Of The Knee    ? Type 2 Diabetes Mellitus      Meds reviewed at facility.       No Known Allergies    Review of Systems   No fever or chills. No headache, lightheadedness or dizziness. No SOB, chest pains or palpitations. No nausea, vomiting, constipation or diarrhea.  Patient denies any dysuria, frequency, burning or pain with urination. Foot rash healed.  Otherwise review of systems are negative.         PHYSICAL EXAMINATION:  General: Patient seen ambulating in the hallway.  Pleasant.    VS: /83, P 70, R 20, T 99.0, O2 sat 95% RA.  Weight 186 pounds.  HEENT:moist oral mucosa.  Severe hard of hearing.  No aids.  She does wear dentures.  NECK: Supple  CARDIOVASCULAR: S1, S2 without murmur or gallop.   RESPIRATORY: No wheezes, rales or rhonchi.   ABDOMEN: Obese.  Soft, non tender with positive bowel sounds.   EXTREMITIES: Good range of motion on both upper and lower extremities, trace pedal edema, no calf tenderness.  Ambulating with a rolling walker.  NEUROLOGIC: Intact, pulses weak.   PSYCHIATRIC: Mild cognitive impairment.  She does remember who I am. She calls me by name.     Assessment and Plan:  1. Bipolar affective disorder, remission status unspecified (H)     2. Bilateral edema of lower extremity     3. Type 2 diabetes mellitus with complication, without long-term current use of insulin (H)     4. Chronic Kidney Disease, Stage 4     5. Tinea pedis of left foot       Patient with bipolar disorder. Mood  stable on Abilify and Wellbutrin. DM type II satisfactory controlled on Levemir. Recent HgbA1c 5.1. CKD stage 4. Creatine 1.64. Down from 1.95. Tinea pedis now resolved. She continues in moisture wicking socks. Edema to BLE well controlled with low dose lasix. Recent addition of latanoprost by eye MD.          Electronically signed by: Kim Reynolds, CNP

## 2021-06-20 NOTE — PROGRESS NOTES
Psychiatric  Progress Note    Date of visit: 8/27/2018         Discussion of Care and Treatment Recommendations:     This is a 87 y.o.  white female with bipolar disorder  She comes for this appointment accompanied by her daughter-in-law.  Patient , her daughter-in-law and I reviewed diagnosis and treatment plan and patient agrees with following recommendations:    1.Patient will take the medications as prescribed.   Psychiatric medications:  Abilify 10 mg every morning for mood stabilization.   Wellbutrin  mg every morning.  Patient will not stop taking medications or adjust them without consulting with the provider.  2.Patient will call with any problems between 2 visits.  3.Patient will go to the emergency room if not feeling safe , unable to function in the community, or if suicidal, homicidal or hearing voices or having paranoia.  4.Patient will abstain from drugs and alcohol.  Patient says she does not use any drugs or alcohol.  5.Patient will not drive if sedated on medications or under influence of any substance.  Patient says she does not drive.  6.Patient will not mix psychiatric medications with drugs and alcohol.   7.Patient will watch his diet and exercise.  8.Patient will see non psychiatric providers for non psychiatric disorders.  9. Next appointment in 6 months.         DIagnoses:       Bipolar disorder manic phase mild      Diabetes mellitus, chronic knee pain, newly discovered kidney insufficiency    Patient Active Problem List   Diagnosis     Cerumen Impaction     (Lower) Leg Localized Swelling Bilateral     Bipolar Disorder     Cellulitis     Type 2 Diabetes Mellitus     Chronic Kidney Disease With Benign Hypertension     Osteoarthritis Of The Knee     Chronic Kidney Disease, Stage 4     Bipolar I disorder, most recent episode (or current) depressed, mild     Adjustment disorder with mixed anxiety and depressed mood     Bipolar I disorder, most recent episode (or current)  depressed, mild     Bipolar I disorder, most recent episode (or current) depressed, in partial or unspecified remission     Intertrigo             Chief Complaint / Subjective:      Chief complaint: frustration with not having batteries in hearing aid and not being able to hear well.    History of Present Illness:Roseanna is here with her daughter in law. She responds well to increased dose of Abilify. She is less agitated. Improved mood lability. Her brother  at the beginning of the year. He was 90 years old. Her 85 year old sister has Alzheimer. That is stressful and it caused depression and mood lability. She says she sleeps good. Appetite is good. Energy is ok. She likes to play the piano. Depression and mood lability improved. Her family is supportive. Son will come from [place] to visit her. She denies suicidal and homicidal ideas, delusions and hallucinations. She says she has a new roommate . She says roommate sleeps all day and is up at night and has music on the radio.She says she is able to sleep, but sometimes she can not sleep well. She says  they do not argue.She says she is frustrated because she can not hear well because her hearing aid does not have batteries. She says she yells because she thinks people do not hear her.She says people have to repeate what they say. It is a case    During th interview. Her daughter in law will bye batteries.She says she uses wheelchair more and more because her legs hurt.       Past psychiatric history:  Psychiatric hospitalizations: One time 13 years ago  Suicide attempt: No  ECT: No  Chemical dependency history: No    Family history:  Psychiatric: No  Suicide: No  Chemical dependency:  no    Social history:  Patient was [place].  She finished 10th grade.  She is .  Her  is in a nursing home.  She had 4 children.  3 are living now.  One  in .  She lives in a nursing home.  Her children are supportive of her.    Mental  Status Examination:   General: fair  hygiene, cooperative   Orientatin: ×3  Speech: loud because she is does not have batteries in  hearing aid   Language: intact   Thought process: [place]  Thought content: devoid of delusions and hallucinations   Suicidal thoughts: absent   Homicidal thoughts: absent   Associations: connected   Affect: Irritable  Mood: improved mood lability  Intellectual functioning: within normal limits   Memory: within normal limits   Fund of knowledge: sufficient systems is negative for: General, eyes, ears, nose, throat, neck, respiratory, cardiovascular, gastrointestinal, genitourinary, meniscal skeletal, neurological, hematological and endocrine system.  Attention and concentration: Normal  Gait: Using a walker  And wheelchair   Psychomotor activity: mild agitation  Muscles: No atrophy, no abnormal movements   InSight and judgment: Fair     Medication adherence: compliant  Medication side effects: absent  Information about medications: Side effects, benefits and alternative treatments discussed and patient and her daughter-in-law and they agree.    Psychotherapy: Supportive therapy regarding above issues    Education: Diet, exercise, abstinence from drugs and alcohol, patient will not drive if sedated and medications or  under influence of any substance    Lab Results: Personally reviewed  Lab Results   Component Value Date    WBC 8.8 06/01/2018    HGB 10.1 (L) 06/01/2018    HCT 32.3 (L) 06/01/2018     06/01/2018    CHOL 130 09/17/2015    TRIG 123 09/17/2015    HDL 49 09/17/2015    ALT 7 09/17/2015    AST 12 09/17/2015     (H) 06/01/2018    K 4.7 06/01/2018     (H) 06/01/2018    CREATININE 1.89 (H) 06/01/2018    BUN 40 (H) 06/01/2018    CO2 27 06/01/2018    TSH 1.68 06/01/2018    HGBA1C 6.6 (H) 02/19/2018    MICROALBUR 2.12 (H) 02/06/2014       Vital signs:  Vitals:    08/27/18 1355   BP: 139/68   Pulse: 69   Resp: 22   Temp: 97.5  F (36.4  C)         Allergies: Review  "of patient's allergies indicates no known allergies.         Medications:       Current Outpatient Prescriptions   Medication Sig Note     acetaminophen (TYLENOL) 500 MG tablet Take 1,000 mg by mouth 2 (two) times a day.      acetaminophen (TYLENOL) 500 MG tablet Take 500 mg by mouth every 4 (four) hours as needed for pain.      allopurinol (ZYLOPRIM) 100 MG tablet Take 100 mg by mouth daily.  5/23/2016: Received from: External Pharmacy     ARIPiprazole (ABILIFY) 10 MG tablet Take 1 tablet (10 mg total) by mouth daily.      buPROPion (WELLBUTRIN XL) 150 MG 24 hr tablet Take 1 tablet (150 mg total) by mouth every morning.      furosemide (LASIX) 20 MG tablet Take 10 mg by mouth every morning.       glucose 4 GM chewable tablet Chew 16 g daily as needed for low blood sugar.      insulin detemir U-100 (LEVEMIR) 100 unit/mL injection Inject 18 Units under the skin at bedtime.      nystatin (MYCOSTATIN) powder Apply 1 application topically 3 (three) times a day as needed.       tolnaftate (TINACTIN) 1 % spray Apply topically 2 (two) times a day.      triamcinolone (KENALOG) 0.1 % cream 1 application 2 (two) times a day as needed.  9/19/2016: Received from: External Pharmacy     insulin syringe-needle U-100 (INSULIN SYRINGE) 1/2 mL 30 x 5/16\" Syrg Use 1 unit marking on U-100 syringe As Directed 4 (four) times a day.      nystatin (MYCOSTATIN) powder Apply 1 application topically daily.             Review of Systems:    As per history of present illness, otherwise reminder of review of  systems is negative for: General, eyes, ears, nose, throat, neck, respiratory, cardiovascular, gastrointestinal, genitourinary, meniscal skeletal, neurological, hematological and endocrine system.    Coordination of Care:   More than 25 minutes spent on this visit  with more than 50% of time spent on coordination of care including: Reviewing the chart, coordinating care with the nurse, reviewing and fillling out  facility forms, " psychoeducation, entering orders and preparing documentation for the visit, providing supportive therapy regarding above issues, communicating with person with decreased hearing which requires additional time.     This note is created with  the help of dictation system.  All grammatical and typing errors or context distortion are  unintentional and inherent to software.    Flor Barrios MD

## 2021-06-20 NOTE — PROGRESS NOTES
[facility]    Facility:   [facility] [[id number]]   Code Status: DNR/DNI      CHIEF COMPLAINT/REASON FOR VISIT:  Chief Complaint   Patient presents with     Review Of Multiple Medical Conditions     Type 2 diabetes, hypertension, depression, bipolar disorder, chronic kidney disease, lower extremity edema, DJD, tinea pedis with fungal infection between the toes, open areas on her feet.  Hyperkalemia on 2/19/2018.       HISTORY:      HPI: Roseanna is a 87 y.o. female size here at the [facility] which is followed for multiple problems and is a type 2 diabetes hypertension depression bipolar disorder chronic kidney disease, lower extremity, degenerative joint disease.  She does have necrotic areas on her foot with tinea pedis in the wound culture showed staph aureus.  I am unclear she was treated for this at this time and this could be definite contaminant.  Her A1c has been stable but only of 2 blood sugars on her of 216 and 107 on 8/1/2018.  She does have osteoarthritis in the knees at this time and she is being seen by our wound nurse practitioner at this time.    Patient has no new concerns today and staff have no concerns nurse practitioners taking care of her at this time and continues to manage her.  Continues to manage her and I will continue with that.  I did review her notes and I agree with the current plan.    History reviewed. No pertinent past medical history.          History reviewed. No pertinent family history.  Social History     Social History     Marital status:      Spouse name: N/A     Number of children: N/A     Years of education: N/A     Social History Main Topics     Smoking status: Former Smoker     Types: Cigarettes     Start date: 5/23/1949     Quit date: 5/23/1990     Smokeless tobacco: Never Used     Alcohol use Yes      Comment: rare     Drug use: No     Sexual activity: Not Asked     Other Topics Concern     None     Social  History Narrative         Review of Systems   Constitutional: Negative for activity change, chills and fever.   HENT: Negative for hearing loss.    Eyes: Negative for visual disturbance.   Respiratory: Negative for apnea, chest tightness and shortness of breath.    Cardiovascular: Negative for chest pain and palpitations.   Gastrointestinal: Negative for abdominal pain, constipation, nausea and vomiting.   Endocrine: Negative for polydipsia, polyphagia and polyuria.   Genitourinary: Negative for decreased urine volume and urgency.   Musculoskeletal: Negative for neck pain and neck stiffness.   Skin: Negative for rash.   Hematological: Does not bruise/bleed easily.   Psychiatric/Behavioral: Negative for agitation and behavioral problems.       .  Vitals:    08/30/18 0839   BP: 146/73   Pulse: 72   Resp: 24   Temp: 97.5  F (36.4  C)   SpO2: 97%       Physical Exam   Constitutional: No distress.   HENT:   Head: Normocephalic and atraumatic.   Nose: Nose normal.   Mouth/Throat: No oropharyngeal exudate.   Eyes: Conjunctivae are normal. Right eye exhibits no discharge. Left eye exhibits no discharge.   Cardiovascular: Normal rate and regular rhythm.    Pulmonary/Chest: Effort normal and breath sounds normal. No respiratory distress.   Abdominal: Soft. Bowel sounds are normal. She exhibits distension. There is no tenderness.   Neurological: She is alert. No cranial nerve deficit. She exhibits normal muscle tone.   Skin: She is not diaphoretic.   Psychiatric: She has a normal mood and affect. Her behavior is normal.         LABS: Laboratory values as follows wound culture on 827 showed staph aureus, A1c on 2/19/2018 was 6.6.  CBC on 2/19/2018 showed a white count of 8.7 hemoglobin 10.2 and platelets 190,000.  On 8/1/2018 2 blood sugars were taken and that is all I have in the chart and there were 216 and 107.  Basic metabolic profile done on 2/19/2018 showed potassium 5.3 sodium 143 CO2 of 27 creatinine 1.7 GFR of 28 and  BUN of 34.      ASSESSMENT:      ICD-10-CM    1. Skin ulcer of right foot, limited to breakdown of skin (H) L97.511    2. Tinea pedis B35.3    3. Diabetes mellitus (H) E11.9    4. Bilateral edema of lower extremity R60.0    5. Chronic Kidney Disease, Stage 4 N18.4    6. Bipolar disorder (H) F31.9        PLAN: Plan at this time will continue to monitor above medical problems.  A1c was 6.6 and 2/19/2018's her next lab day and will be appropriate to get another one since I do not have any blood sugars at this time.  There are no need to check any other medications but a potassium should be checked secondary to her hyperkalemia.  I will continue to monitor above medical problems are bipolar and her depression seems to be well-controlled at this time and will check a basic metabolic profile next lab day.  Nurse practitioner which is wound certified is going to take care of her tinea pedis and continue to care for that at this time and no other changes to care plan at this time.      Total  minutes of which % was spent counseling and coordination of care of the above plan.    Electronically signed by: Sher Monaco DO

## 2021-06-20 NOTE — PROGRESS NOTES
Code Status:  DNR/DNI  Visit Type: Problem Visit     Facility:  [facility] [[id number]]         Facility Type:  (Long Term Care, LTC)    History of Present Illness: Roseanna Mcmahan is a 87 y.o. female who I am seeing today at the request of the nurses.  Pt with hx of diabetes type II, hypertension, bipolar depression, anemia of chronic disease, chronic kidney disease and LE edema. DJD of the knees.  Patient with chronic nonhealing wound to the top of the right foot.  Initially patient treated with multiple topical treatments including antifungal for athlete's foot.  Wound culture obtained.  Which was positive for staph aureus.  Patient has completed her antibiotic therapy. Nursing staff stated she had some weeping from top of her foot again. Today area appears dry, no redness. Slight moisture between first and second digits.  A-febrile. No pain. She continues with Epsom salt soaks and calmoseptine topically.     Active Ambulatory Problems     Diagnosis Date Noted     Cerumen Impaction      (Lower) Leg Localized Swelling Bilateral      Bipolar Disorder      Cellulitis      Type 2 Diabetes Mellitus      Chronic Kidney Disease With Benign Hypertension      Osteoarthritis Of The Knee      Chronic Kidney Disease, Stage 4      Bipolar I disorder, most recent episode (or current) depressed, mild 08/11/2014     Adjustment disorder with mixed anxiety and depressed mood 08/11/2014     Bipolar I disorder, most recent episode (or current) depressed, mild 10/13/2014     Bipolar I disorder, most recent episode (or current) depressed, in partial or unspecified remission 04/13/2015     Intertrigo 12/14/2015     Resolved Ambulatory Problems     Diagnosis Date Noted     No Resolved Ambulatory Problems     No Additional Past Medical History     Meds reviewed at facility.       No Known Allergies    Review of Systems   Patient denies any pain in her foot.  Reports increased itching of the right foot. Swelling less  than usual.    PHYSICAL EXAMINATION:  General: Awake, Alert, sitting up in recliner.   VS: /82, pulse 72, respirations 24, temperature 98.2  HEENT:moist oral mucosa.  Severe hard of hearing.  No aids.  She does wear dentures.  EXTREMITIES: Good range of motion on both upper and lower extremities, 1+pedal edema, no calf tenderness.   SKIN: Skin over top of the foot dry and intact.  Nursing staff reported this was not seen on exam.  Slightly moist between the first and second digit. Gauze packing in place.   NEUROLOGIC: Intact, pulses palpable  PSYCHIATRIC: Cognition intact.Pleasant affect.      Assessment and Plan:  1. Skin ulcer of right foot, limited to breakdown of skin (H)     2. Diabetic foot infection (H)     3. Tinea pedis       Pt with chronic ulcer to top of right foot recently cultured postive for staph. She has completed her antibiotics. She continues with Epsom salt soaks and calmoseptine topically. Area dry today. No redness. Slightly moist between first and second digits. Will d/c Epsom salt soaks and continue with topical treatment.       Electronically signed by: Kim Reynolds, CNP

## 2021-06-20 NOTE — PROGRESS NOTES
Code Status:  DNR/DNI  Visit Type: Problem Visit     Facility:  [facility] [[id number]]         Facility Type:  (Long Term Care, LTC)    History of Present Illness: Roseanna Mcmahan is a 87 y.o. female who I am seeing today for follow-up of right foot wounds.  Pt with hx of diabetes type II, hypertension, bipolar depression, anemia of chronic disease, chronic kidney disease and LE edema. DJD of the knees.  Patient with chronic diabetes with chronic low sensation of the right foot.  It does appear fungal in appearance.  Have tried multiple topical and treatments including antifungal ointment, antifungal powder, Silvadene and calmoseptine.  She was treated with oral antifungal without much relief.  She denies any pain.  She does have significant hammertoe.  Blood sugars are well controlled.  She denies any pain.      Active Ambulatory Problems     Diagnosis Date Noted     Cerumen Impaction      (Lower) Leg Localized Swelling Bilateral      Bipolar Disorder      Cellulitis      Type 2 Diabetes Mellitus      Chronic Kidney Disease With Benign Hypertension      Osteoarthritis Of The Knee      Chronic Kidney Disease, Stage 4      Bipolar I disorder, most recent episode (or current) depressed, mild 08/11/2014     Adjustment disorder with mixed anxiety and depressed mood 08/11/2014     Bipolar I disorder, most recent episode (or current) depressed, mild 10/13/2014     Bipolar I disorder, most recent episode (or current) depressed, in partial or unspecified remission 04/13/2015     Intertrigo 12/14/2015     Resolved Ambulatory Problems     Diagnosis Date Noted     No Resolved Ambulatory Problems     No Additional Past Medical History     Meds reviewed at facility.       No Known Allergies    Review of Systems   Patient denies any pain in her foot.  Reports increased itching of the right foot. Swelling less than usual.    PHYSICAL EXAMINATION:  Vital signs:   /73, heart rate 72, respirations 24,  temperature 97.5, O2 sat 97% on room air., current weight 191.2 pounds.  General: Awake, Alert, oriented x3, appropriately, follows simple commands, conversant. Pt sitting up in recliner.    HEENT:moist oral mucosa.  Severe hard of hearing.  No aids.  She does wear dentures.  EXTREMITIES: Good range of motion on both upper and lower extremities, DJD of mx joints no focal inflammation,1+pedal edema, no calf tenderness. Right foot with significant hammertoe of the second digit.  Moderate drainage from the top of the right foot between the toes.  Maceration noted.  Scaling and redness over the top of the foot.  Appears fungal.  Sock wet.  NEUROLOGIC: Intact, pulses palpable  PSYCHIATRIC: Cognition intact.Pleasant affect.      Assessment and Plan:  1. Skin ulcer of right foot, limited to breakdown of skin (H)     2. Tinea pedis       Patient with history of tennis pedis to the right foot.  She continues with chronic nonhealing wound of the right foot.  Moderate vascularization with moderate drainage.  I have tried multiple topical treatments including antifungal, calmoseptine and Silvadene.  Will obtain wound culture.  Will start Epsom salt soaks daily to right foot.  Will continue with antifungal for now.  Blood sugars are well controlled.  Recent oral antifungal with no improvement.    Electronically signed by: Kim Reynolds, JUANI

## 2021-06-20 NOTE — LETTER
Letter by Kim Reynolds CNP at      Author: Kim Reynolds CNP Service: -- Author Type: --    Filed:  Encounter Date: 6/10/2020 Status: (Other)         Patient: Roseanna Mcmahan   MR Number: [id number]   YOB: 1931   Date of Visit: 6/10/2020     Code Status:  DNR/DNI  Visit Type: Problem Visit (confusion, weakness, falls )     Facility:  [facility] [[id number]]         Facility Type:  (Long Term Care, LTC)    History of Present Illness: Roseanna Mcmahan is a 89 y.o. female who I am seeing today at the request of the nursing staff. Pt with fall x2 without injury, increased weakness and confusion.  Past medical history includes diabetes type II, hypertension, bipolar depression, anemia of chronic disease, chronic kidney disease and LE edema. DJD of the knees.  Today patient sitting up in the day room.  She is eating her breakfast.  Patient appears dazed and confused.  She does attempt to answer questions.  Weakness noted.  Expiratory wheezing with coughing with food.  Patient afebrile.  However would look back in the record BP earlier this a.m. was 88/53.  She has consistently had heart rates in the 100s ranging from 106-116.  At O2 sats normal on room air.  Patient with past history of UTI. DJD of multiple joints.  PRN Tylenol available.  Patient with underlying bipolar disorder.  Symptoms well controlled with Abilify and Wellbutrin.  Diabetes mellitus type 2.  Blood sugar satisfactory on Levemir.  History of gout on allopurinol.       Active Ambulatory Problems     Diagnosis Date Noted   ? Cerumen Impaction    ? (Lower) Leg Localized Swelling Bilateral    ? Bipolar Disorder    ? Cellulitis    ? Type 2 Diabetes Mellitus    ? Chronic Kidney Disease With Benign Hypertension    ? Osteoarthritis Of The Knee    ? Chronic Kidney Disease, Stage 4    ? Bipolar I disorder, most recent episode (or current) depressed, mild 08/11/2014   ? Adjustment disorder with mixed anxiety and  depressed mood 08/11/2014   ? Bipolar I disorder, most recent episode (or current) depressed, mild 10/13/2014   ? Bipolar I disorder, most recent episode (or current) depressed, in partial or unspecified remission 04/13/2015   ? Intertrigo 12/14/2015     Resolved Ambulatory Problems     Diagnosis Date Noted   ? No Resolved Ambulatory Problems     No Additional Past Medical History     Current Outpatient Medications   Medication Sig   ? acetaminophen (TYLENOL) 500 MG tablet Take 500 mg by mouth every 6 (six) hours as needed for pain.          ? allopurinol (ZYLOPRIM) 100 MG tablet Take 100 mg by mouth daily.    ? ARIPiprazole (ABILIFY) 10 MG tablet Take 1 tablet (10 mg total) by mouth daily.   ? buPROPion (WELLBUTRIN XL) 150 MG 24 hr tablet Take 1 tablet (150 mg total) by mouth every morning.   ? glucose 4 GM chewable tablet Chew 16 g daily as needed for low blood sugar.   ? insulin detemir U-100 (LEVEMIR) 100 unit/mL injection Inject 18 Units under the skin at bedtime.   ? latanoprost (XALATAN) 0.005 % ophthalmic solution Administer 1 drop to both eyes at bedtime.   ? nystatin (MYCOSTATIN) powder Apply 1 application topically 3 (three) times a day as needed.    ? triamcinolone (KENALOG) 0.1 % lotion Apply 1 application topically 2 (two) times a day as needed. Apply small amount to upper arms and back as needed.           No Known Allergies    Review of Systems   Somewhat of poor historian. + falls. No injury. No fever or chills. No headache, lightheadedness or dizziness.  Patient denies SOB, + wheezing and coughing with food, no chest pains or palpitations. No nausea, vomiting, constipation or diarrhea.  Patient denies any dysuria, frequency, burning or pain with urination. Otherwise review of systems are negative.         PHYSICAL EXAMINATION:  General: Patient sitting up at table in day room, eating brunch.   VS: BP 88/53, pulse 88, respirations 24, temperature 98.5, O2 sat 95 % RA.  Weight 179.8  pounds.  HEENT:moist oral mucosa.  Severe hard of hearing.  No aids.  She does wear dentures which are loose in her mouth.  Facial symmetry.  Tongue is midline.  NECK: Supple  CARDIOVASCULAR: S1, S2 without murmur or gallop.   RESPIRATORY: Expiratory wheezing noted.  Occasional coughing with eating.  ABDOMEN: Obese.     EXTREMITIES: Movesboth upper and lower extremities with generalized weakness, trace pedal edema, no calf tenderness.   equal.  Patient able to overcome gravity.  NEUROLOGIC: Intact  PSYCHIATRIC: Advanced cognitive impairment.       Assessment and Plan:  1. Confusion  Underlying cognitive impairment with increased confusion  Check CBC, BMP, BNP and pro calcitonin.    2. Fall, initial encounter  No acute injury.    3. Wheezing  CXR 2 view to rule out fluid overload vs aspiration pneumonia.   Speech therapy to eval and treat for dysphasia.  Ill fitting dentures.   4. Bipolar affective disorder, remission status unspecified (H)  Continues on Abilify and Wellbutrin    5. Type 2 diabetes mellitus with complication, without long-term current use of insulin (H)   well-controlled with Levemir.   6. Chronic Kidney Disease, Stage 4   baseline creatinine around 1.9.  Follow-up BMP.  Push fluids.  Patient no longer on Lasix.   7. Recurrent UTI   UA /UC.           Electronically signed by: Kim Reynolds, CNP

## 2021-06-20 NOTE — LETTER
"Letter by Kim Reynolds CNP at      Author: Kim Reynolds CNP Service: -- Author Type: --    Filed:  Encounter Date: 6/11/2020 Status: (Other)         Patient: Roseanna Mcmahan   MR Number: [id number]   YOB: 1931   Date of Visit: 6/11/2020     [facility]   Video Visit    Code Status: DNR/DNI    Roseanna Mcmahan is a 89 y.o. female who is being evaluated via a billable video visit.      The patient has been notified of following:     \"This video visit will be conducted via a call between you and your physician/provider. We have found that certain health care needs can be provided without the need for an in-person physical exam.  This service lets us provide the care you need with a video conversation.  If a prescription is necessary we can send it to the facility team.  If lab work is needed we can place an order through the facility team to have that test done at a later time.    If during the course of the call the physician/provider feels a video visit is not appropriate, you will not be charged for this service.\"     Physician/provider has received verbal consent for a Video Visit from the patient? Yes        Video Start Time: 10:30 pm     Chief Complaint/Reason for Visit:  Chief Complaint   Patient presents with   ? Problem Visit     MARGOTH       HPI:   Roseanna is a 89 y.o. female who I am seeing today for follow up of acute kidney injury with UTI. Pt with recent fall x2 without injury, increased weakness and confusion. Past medical history includes diabetes type II, hypertension, bipolar depression, anemia of chronic disease, chronic kidney disease and LE edema. DJD of the knees. UA/UC obtained and showed positive nitrates, leukocytes and bacteria. Pt with low grade temps in the 100s. CBC and BMP obtained. WBC 11.6. Creatine 2.38 and GFR 19. Baseline creatine 1.6-1.8. Pt no longer on diuretics. Fluids have been pushed over the last 24 hours. BP on the soft " side. Pt given 1 gm of IM Rocephin this am.     Active Ambulatory Problems     Diagnosis Date Noted   ? Cerumen Impaction    ? (Lower) Leg Localized Swelling Bilateral    ? Bipolar Disorder    ? Cellulitis    ? Type 2 Diabetes Mellitus    ? Chronic Kidney Disease With Benign Hypertension    ? Osteoarthritis Of The Knee    ? Chronic Kidney Disease, Stage 4    ? Bipolar I disorder, most recent episode (or current) depressed, mild 08/11/2014   ? Adjustment disorder with mixed anxiety and depressed mood 08/11/2014   ? Bipolar I disorder, most recent episode (or current) depressed, mild 10/13/2014   ? Bipolar I disorder, most recent episode (or current) depressed, in partial or unspecified remission 04/13/2015   ? Intertrigo 12/14/2015     Resolved Ambulatory Problems     Diagnosis Date Noted   ? No Resolved Ambulatory Problems     No Additional Past Medical History     Current Outpatient Medications   Medication Sig   ? acetaminophen (TYLENOL) 500 MG tablet Take 500 mg by mouth every 6 (six) hours as needed for pain.          ? allopurinol (ZYLOPRIM) 100 MG tablet Take 100 mg by mouth daily.    ? ARIPiprazole (ABILIFY) 10 MG tablet Take 1 tablet (10 mg total) by mouth daily.   ? buPROPion (WELLBUTRIN XL) 150 MG 24 hr tablet Take 1 tablet (150 mg total) by mouth every morning.   ? glucose 4 GM chewable tablet Chew 16 g daily as needed for low blood sugar.   ? insulin detemir U-100 (LEVEMIR) 100 unit/mL injection Inject 18 Units under the skin at bedtime.   ? latanoprost (XALATAN) 0.005 % ophthalmic solution Administer 1 drop to both eyes at bedtime.   ? nystatin (MYCOSTATIN) powder Apply 1 application topically 3 (three) times a day as needed.    ? triamcinolone (KENALOG) 0.1 % lotion Apply 1 application topically 2 (two) times a day as needed. Apply small amount to upper arms and back as needed.       Review of Systems   No fevers or chills. No headache, lightheadedness or dizziness. No SOB, chest pains or palpitations.  Appetite is good. No nausea, vomiting, constipation or diarrhea. No dysuria, frequency, burning or pain with urination. Otherwise review of systems are negative.     Physical Exam:   VS: /69, P 98, R 18, T 97.8, O2 sat 94% on RA.       MEDICATION LIST:  Current Outpatient Medications   Medication Sig   ? acetaminophen (TYLENOL) 500 MG tablet Take 500 mg by mouth every 6 (six) hours as needed for pain.          ? allopurinol (ZYLOPRIM) 100 MG tablet Take 100 mg by mouth daily.    ? ARIPiprazole (ABILIFY) 10 MG tablet Take 1 tablet (10 mg total) by mouth daily.   ? buPROPion (WELLBUTRIN XL) 150 MG 24 hr tablet Take 1 tablet (150 mg total) by mouth every morning.   ? glucose 4 GM chewable tablet Chew 16 g daily as needed for low blood sugar.   ? insulin detemir U-100 (LEVEMIR) 100 unit/mL injection Inject 18 Units under the skin at bedtime.   ? latanoprost (XALATAN) 0.005 % ophthalmic solution Administer 1 drop to both eyes at bedtime.   ? nystatin (MYCOSTATIN) powder Apply 1 application topically 3 (three) times a day as needed.    ? triamcinolone (KENALOG) 0.1 % lotion Apply 1 application topically 2 (two) times a day as needed. Apply small amount to upper arms and back as needed.       Labs:  Lab Results   Component Value Date    WBC 11.6 (H) 06/11/2020    HGB 11.1 (L) 06/11/2020    HCT 36.5 06/11/2020     06/11/2020     06/11/2020     Results for orders placed or performed in visit on 06/11/20   Basic Metabolic Panel   Result Value Ref Range    Sodium 141 136 - 145 mmol/L    Potassium 4.0 3.5 - 5.0 mmol/L    Chloride 104 98 - 107 mmol/L    CO2 27 22 - 31 mmol/L    Anion Gap, Calculation 10 5 - 18 mmol/L    Glucose 91 70 - 125 mg/dL    Calcium 8.1 (L) 8.5 - 10.5 mg/dL    BUN 36 (H) 8 - 28 mg/dL    Creatinine 2.58 (H) 0.60 - 1.10 mg/dL    GFR MDRD Af Amer 21 (L) >60 mL/min/1.73m2    GFR MDRD Non Af Amer 17 (L) >60 mL/min/1.73m2       Lab Results   Component Value Date    COLORU Yellow 06/10/2020     CLARITYU Turbid (!) 06/10/2020    GLUCOSEU Negative 06/10/2020    BILIRUBINUR Negative 06/10/2020    KETONESU Negative 06/10/2020    SPECGRAV 1.022 06/10/2020    HGBUA Small (!) 06/10/2020    PHUR 5.5 06/10/2020    PROTEINUA 200 mg/dL (!) 06/10/2020    UROBILINOGEN <2.0 E.U./dL 06/10/2020    NITRITE Positive (!) 06/10/2020    LEUKOCYTESUR Large (!) 06/10/2020    BACTERIA Many (!) 06/10/2020    RBCUA 0-2 06/10/2020    WBCUA >100 (!) 06/10/2020    SQUAMEPI 0-5 06/10/2020           Assessment/Plan:  1. Recurrent UTI  UA positive. Pt with fever and confusion.   1 gm IM Rocephin given this am.   Transition to orals when UC received.    2. Acute renal failure superimposed on chronic kidney disease, unspecified CKD stage, unspecified acute renal failure type (H)  Creatine now 2.38. GFR 19. Baseline 1.6-1.9.   Give 1L NS at 100 cc/hr.   Repeat BMP in am.    3. Fall, initial encounter  Monitor. Alarm applied.    4. Confusion  Due to UTI.    5. Type 2 diabetes mellitus with complication, without long-term current use of insulin (H)  Continues on Insulin.      Video-Visit Details    Type of service:  Video Visit    Video End Time 11:25 pm     [place]iginating Location ([place]. Location[place] [place] [[id number][place]    Distant Location (provider location):  [facility]     Mode of Communication:   Phone Conference with pt and facility nurse     Kim Reynolds, CNP

## 2021-06-20 NOTE — LETTER
Letter by Kim Reynolds CNP at      Author: Kim Reynolds CNP Service: -- Author Type: --    Filed:  Encounter Date: 5/18/2020 Status: (Other)         Patient: Roseanna Mcmahan   MR Number: [id number]   YOB: 1931   Date of Visit: 5/18/2020     Code Status:  DNR/DNI  Visit Type: FVP Care Coordination - Home Visit-Annual Assessment     Facility:  [facility] [[id number]]         Facility Type:  (Long Term Care, LTC)    History of Present Illness: Roseanna Mcmahan is a 89 y.o. female who I am seeing today for annual visit.   Pt with hx of diabetes type II, hypertension, bipolar depression, anemia of chronic disease, chronic kidney disease and LE edema. DJD of the knees. Pt with recent fall. Pt slide out of her recliner. Non skid material added to seat of chair. Pt denies any injury or pain. Pt with UTI on 4/9/20. Urine culture ,000 enterococcus species.  Pt treated with antibiotics due to increased confusion, fall and lethargy. She has had no further symptoms. Patient continues to ambulate with a rolling walker.  DJD of multiple joints.  PRN Tylenol available.  Patient with underlying bipolar disorder.  Symptoms well controlled with Abilify and Wellbutrin.  Diabetes mellitus type 2.  Blood sugar satisfactory on Levemir.  History of gout on allopurinol. Increasing cognitive impairment noted.      Active Ambulatory Problems     Diagnosis Date Noted   ? Cerumen Impaction    ? (Lower) Leg Localized Swelling Bilateral    ? Bipolar Disorder    ? Cellulitis    ? Type 2 Diabetes Mellitus    ? Chronic Kidney Disease With Benign Hypertension    ? Osteoarthritis Of The Knee    ? Chronic Kidney Disease, Stage 4    ? Bipolar I disorder, most recent episode (or current) depressed, mild 08/11/2014   ? Adjustment disorder with mixed anxiety and depressed mood 08/11/2014   ? Bipolar I disorder, most recent episode (or current) depressed, mild 10/13/2014   ? Bipolar I disorder,  most recent episode (or current) depressed, in partial or unspecified remission 04/13/2015   ? Intertrigo 12/14/2015     Resolved Ambulatory Problems     Diagnosis Date Noted   ? No Resolved Ambulatory Problems     No Additional Past Medical History     Current Outpatient Medications   Medication Sig   ? acetaminophen (TYLENOL) 500 MG tablet Take 500 mg by mouth every 6 (six) hours as needed for pain.          ? allopurinol (ZYLOPRIM) 100 MG tablet Take 100 mg by mouth daily.    ? ARIPiprazole (ABILIFY) 10 MG tablet Take 1 tablet (10 mg total) by mouth daily.   ? buPROPion (WELLBUTRIN XL) 150 MG 24 hr tablet Take 1 tablet (150 mg total) by mouth every morning.   ? glucose 4 GM chewable tablet Chew 16 g daily as needed for low blood sugar.   ? insulin detemir U-100 (LEVEMIR) 100 unit/mL injection Inject 18 Units under the skin at bedtime.   ? latanoprost (XALATAN) 0.005 % ophthalmic solution Administer 1 drop to both eyes at bedtime.   ? nystatin (MYCOSTATIN) powder Apply 1 application topically 3 (three) times a day as needed.    ? triamcinolone (KENALOG) 0.1 % lotion Apply 1 application topically 2 (two) times a day as needed. Apply small amount to upper arms and back as needed.           No Known Allergies    Review of Systems   No fever or chills. No headache, lightheadedness or dizziness. No SOB, chest pains or palpitations. No nausea, vomiting, constipation or diarrhea.  Patient denies any dysuria, frequency, burning or pain with urination. Foot rash healed.  Otherwise review of systems are negative.         PHYSICAL EXAMINATION:  General: Patient sitting up in bedside chair. Pleasant.    VS: /80, pulse 67, respiration 20, temperature 98.7, O2 sat 97% RA.  Weight 181.2 pounds.  HEENT:moist oral mucosa.  Severe hard of hearing.  No aids.  She does wear dentures.  NECK: Supple  CARDIOVASCULAR: S1, S2 without murmur or gallop.   RESPIRATORY: No wheezes, rales or rhonchi.   ABDOMEN: Obese.     EXTREMITIES:  Good range of motion on both upper and lower extremities, trace pedal edema, no calf tenderness.   NEUROLOGIC: Intact  PSYCHIATRIC: Increasing cognitive impairment.       Assessment and Plan:  1. Bipolar affective disorder, remission status unspecified (H)  Controlled on Abilify and Wellbutrin.    2. Type 2 diabetes mellitus with complication, without long-term current use of insulin (H)  Well controlled on Levemir. HgbA1c 1.   F/u HgbA1c.    3. Bilateral edema of lower extremity  Well controlled. She is no longer on Lasix due to CKD. Weight stable.    4. Chronic Kidney Disease, Stage 4  Creatine 1.89, Baseline 1.9-2.0.   Follow up CBC and BMP on Thursday.        Case Management:  I have reviewed the facility/SNF care plan/MDS which was done 03/19/20, including the falls risk, nutrition and pain screening. I also reviewed the current immunizations, and preventive care.. Future cancer screening is not clinically indicated secondary to age/goals of care.   Patient's desire to return to the community is present, but is not able due to care needs .    Advance Directive Discussion:    I reviewed the current advanced directives as reflected in EPIC and the facility chart. I did not due to cognitive impairment review the advance directives with the resident.     Team Discussion:  I communicated with the appropriate disciplines involved with the Plan of Care:   Nursing      Patient Goal:  Patient's goal is unobtainable secondary to cognitive impairment.    Information reviewed:  Medications, vital signs, orders, and nursing notes.      Electronically signed by: Kim Reynolds CNP           Electronically signed by: Kim Reynolds CNP

## 2021-06-20 NOTE — LETTER
Letter by Sher Monaco DO at      Author: Sher Monaco DO Service: -- Author Type: --    Filed:  Encounter Date: 7/6/2020 Status: (Other)         Patient: Roseanna Mcmahan   MR Number: [id number]   YOB: 1931   Date of Visit: 7/6/2020     [facility]    Facility:   [facility] [[id number]]   Code Status: DNR/DNI      CHIEF COMPLAINT/REASON FOR VISIT:  Chief Complaint   Patient presents with   ? Review Of Multiple Medical Conditions       HISTORY:      HPI: Roseanna is a 89 y.o. female resides here at [facility] for multiple medical problems she is 89 years old she is a type 2 diabetes and she also has hypertension bipolar depression.  This is been pretty much stable and in remission from notes a red and she does have anemia of chronic disease.  Patient does have hypertension recent blood pressures 160/80 we will recheck that.  She does ambulate with a rolling walker at this time.  Recent blood sugars is as follows 172, 158 177 and there on 6/10/2020 7/1/2020 and 7/1/2020.  No other blood sugars are available in chart.    Patient claims she is doing well today has no concerns she is moving her bowels well and eating well and nursing staff indicates she is taking fluids without any issues.  She has no pain issues at this time and denies any other issues and that is reiterated and staff.    Past Medical History:   Diagnosis Date   ? (Lower) Leg Localized Swelling Bilateral     Created by Conversion    ? Adjustment disorder with mixed anxiety and depressed mood 8/11/2014   ? Bipolar Disorder     Created by Conversion  Replacement Utility updated for latest IMO load   ? Bipolar I disorder, most recent episode (or current) depressed, in partial or unspecified remission 4/13/2015   ? Bipolar I disorder, most recent episode (or current) depressed, mild 8/11/2014     Replacement Utility updated for latest IMO load   ? Cellulitis     Created by  Conversion    ? Cerumen Impaction     Created by Conversion    ? Chronic Kidney Disease With Benign Hypertension     Created by Conversion    ? Chronic Kidney Disease, Stage 4     Created by Conversion    ? Intertrigo 2015   ? Osteoarthritis Of The Knee     Created by Conversion  Replacement Utility updated for latest IMO load   ? Type 2 Diabetes Mellitus     Created by Conversion              Family History   Problem Relation Age of Onset   ? Diabetes Sister    ? Cancer Sister    ? Diabetes Brother    ? Cancer Brother    ? Diabetes Son    ? Diabetes Sister    ? Cancer Son         colon cancer     Social History     Socioeconomic History   ? Marital status:      Spouse name: None   ? Number of children: None   ? Years of education: None   ? Highest education level: None   Occupational History   ? None   Social Needs   ? Financial resource strain: None   ? Food insecurity     Worry: None     Inability: None   ? Transportation needs     Medical: None     Non-medical: None   Tobacco Use   ? Smoking status: Former Smoker     Types: Cigarettes     Start date: 1949     Last attempt to quit: 1990     Years since quittin.1   ? Smokeless tobacco: Never Used   Substance and Sexual Activity   ? Alcohol use: No     Frequency: Never     Comment: rare   ? Drug use: No   ? Sexual activity: Never   Lifestyle   ? Physical activity     Days per week: None     Minutes per session: None   ? Stress: None   Relationships   ? Social connections     Talks on phone: None     Gets together: None     Attends [employer] service: None     Active member of club or organization: None     Attends meetings of clubs or organizations: None     Relationship status: None   ? Intimate partner violence     Fear of current or ex partner: None     Emotionally abused: None     Physically abused: None     Forced sexual activity: None   Other Topics Concern   ? None   Social History Narrative   ? None         Review of Systems    Constitutional:        Patient denies any pain fevers chills nausea vomit diarrhea change in vision hearing taste or smell weakness 1 side of the chest pain shortness of breath.  She denies any current shortness of polyphasia polydipsia polyuria depression or anxiety and the main review of systems negative.       Vitals:    07/06/20 1248   BP: 160/81   Pulse: 69   Resp: 20   Temp: 97.8  F (36.6  C)   SpO2: 97%       Physical Exam  Constitutional:       General: She is not in acute distress.     Appearance: She is not toxic-appearing.   HENT:      Head: Normocephalic and atraumatic.   Eyes:      General:         Right eye: No discharge.         Left eye: No discharge.   Cardiovascular:      Rate and Rhythm: Normal rate and regular rhythm.   Pulmonary:      Effort: Pulmonary effort is normal. No respiratory distress.   Abdominal:      General: Bowel sounds are normal. There is distension.      Tenderness: There is no abdominal tenderness.   Neurological:      Mental Status: She is alert. Mental status is at baseline.   Psychiatric:         Mood and Affect: Mood normal.         Behavior: Behavior normal.           LABS: Laboratory results from 6/16/2020 is as follows white count was 9.1, hemoglobin 11.2, platelets 199,000.  Sodium was 142, potassium 4.0, CO2 is 29, anion gap was 7, BUN was 18, calcium was 8.6, creatinine 1.87, GFR was 25.  Creatinine had improved from previous on 12/5/2018 it was 2.13.      ASSESSMENT:      ICD-10-CM    1. Confusion  R41.0    2. Recurrent UTI  N39.0    3. Acute renal failure superimposed on chronic kidney disease, unspecified CKD stage, unspecified acute renal failure type (H)  N17.9     N18.9    4. Type 2 diabetes mellitus with complication, without long-term current use of insulin (H)  E11.8    5. Chronic Kidney Disease, Stage 4  N18.4    6. Bipolar affective disorder, remission status unspecified (H)  F31.9    7. Bilateral edema of lower extremity  R60.0        PLAN: Plan at this  time continue to monitor above medical problems and no new changes.  Recommend on next visit that we do check a basic metabolic profile to see her kidney function is at this time.  Her bipolar affective disorder seems to be in remission at this time and she has no issues and staff have no new concerns blood sugars do not look too bad at this time for what was available to me at this time and will continue to monitor.  I will continue to monitor above medical problems no other changes to care plan at this time.  Care plan was reviewed and is appropriate.      Total  minutes of which % was spent counseling and coordination of care of the above plan.    Electronically signed by: Sher Monaco DO

## 2021-06-20 NOTE — LETTER
Letter by Sher Monaco DO at      Author: Sher Monaco DO Service: -- Author Type: --    Filed:  Encounter Date: 2/6/2020 Status: (Other)         Patient: Roseanna Mcmahan   MR Number: [id number]   YOB: 1931   Date of Visit: 2/6/2020     [facility]    Facility:   [facility] [[id number]]   Code Status: DNR/DNI      CHIEF COMPLAINT/REASON FOR VISIT:  Chief Complaint   Patient presents with   ? Review Of Multiple Medical Conditions     Type 2 diabetes, hypertension, bipolar depression, anemia of chronic disease, chronic kidney disease, chronic lower extremity edema, DJD, pain management.       HISTORY:      HPI: Roseanna is a 88 y.o. female who resides at the [facility] with multiple medical problems.  She does have chronic kidney disease with creatinine was trending upward but my last creatinine was about her baseline.  She does have chronic lower extremity edema and she was recently treated for fungal infection in her feet tinea pedis which she was treated appropriately.  According to notes this has resolved.  She does essential hypertension as well as type 2 diabetes was bipolar depression and anemia of chronic disease.    Last hemoglobin was on 2/19/2019 and was 10.6 with a white count of 7.9.  Platelets were normal 193,000.    Patient has no new concerns and staff have no new concerns.  She has been doing very well at this time and there are no new issues reported.    Past Medical History:   Diagnosis Date   ? (Lower) Leg Localized Swelling Bilateral     Created by Conversion    ? Adjustment disorder with mixed anxiety and depressed mood 8/11/2014   ? Bipolar Disorder     Created by Conversion  Replacement Utility updated for latest IMO load   ? Bipolar I disorder, most recent episode (or current) depressed, in partial or unspecified remission 4/13/2015   ? Bipolar I disorder, most recent episode (or current) depressed, mild  2014     Replacement Utility updated for latest IMO load   ? Cellulitis     Created by Conversion    ? Cerumen Impaction     Created by Conversion    ? Chronic Kidney Disease With Benign Hypertension     Created by Conversion    ? Chronic Kidney Disease, Stage 4     Created by Conversion    ? Intertrigo 2015   ? Osteoarthritis Of The Knee     Created by Conversion  Replacement Utility updated for latest IMO load   ? Type 2 Diabetes Mellitus     Created by Conversion              Family History   Problem Relation Age of Onset   ? Diabetes Sister    ? Cancer Sister    ? Diabetes Brother    ? Cancer Brother    ? Diabetes Son    ? Diabetes Sister    ? Cancer Son         colon cancer     Social History     Socioeconomic History   ? Marital status:      Spouse name: None   ? Number of children: None   ? Years of education: None   ? Highest education level: None   Occupational History   ? None   Social Needs   ? Financial resource strain: None   ? Food insecurity:     Worry: None     Inability: None   ? Transportation needs:     Medical: None     Non-medical: None   Tobacco Use   ? Smoking status: Former Smoker     Types: Cigarettes     Start date: 1949     Last attempt to quit: 1990     Years since quittin.7   ? Smokeless tobacco: Never Used   Substance and Sexual Activity   ? Alcohol use: No     Frequency: Never     Comment: rare   ? Drug use: No   ? Sexual activity: Never   Lifestyle   ? Physical activity:     Days per week: None     Minutes per session: None   ? Stress: None   Relationships   ? Social connections:     Talks on phone: None     Gets together: None     Attends Anabaptist service: None     Active member of club or organization: None     Attends meetings of clubs or organizations: None     Relationship status: None   ? Intimate partner violence:     Fear of current or ex partner: None     Emotionally abused: None     Physically abused: None     Forced sexual activity: None    Other Topics Concern   ? None   Social History Narrative   ? None         Review of Systems   Constitutional:        Patient denies any pain fevers chills nausea vomit diarrhea change in vision hearing taste or smell weakness one side of the chest pain shortness of breath.  She denies any issues with her bowels or urine and has no polyphagia polydipsia polyuria depression or anxiety and the remainder of the review of systems is negative.       Vitals:    02/06/20 0903   BP: 154/88   Pulse: 89   Resp: 20   Temp: 98.1  F (36.7  C)   SpO2: 96%       Physical Exam  Constitutional:       General: She is not in acute distress.  HENT:      Head: Atraumatic.      Ears:      Comments: Patient has difficulty hearing     Nose: Nose normal. No congestion or rhinorrhea.      Mouth/Throat:      Mouth: Mucous membranes are moist.      Pharynx: Oropharynx is clear.   Eyes:      General:         Right eye: No discharge.         Left eye: No discharge.   Cardiovascular:      Rate and Rhythm: Normal rate and regular rhythm.      Heart sounds: No murmur. No gallop.    Pulmonary:      Effort: Pulmonary effort is normal. No respiratory distress.      Breath sounds: No wheezing.   Abdominal:      General: Bowel sounds are normal.      Tenderness: There is no abdominal tenderness.   Musculoskeletal:      Comments: Patient has trace edema bilateral.   Skin:     General: Skin is dry.   Neurological:      Mental Status: She is alert. Mental status is at baseline.   Psychiatric:         Mood and Affect: Mood normal.         Behavior: Behavior normal.           LABS: On 12/9/2019 basic metabolic profiles is as follows sodium is 144, potassium 4.7, CO2 is 29, calcium was 8.5, BUN was 35, creatinine 1.89, GFR was 25.  A1c 1 year ago was 6.1.      ASSESSMENT:      ICD-10-CM    1. Bipolar affective disorder, remission status unspecified (H) F31.9    2. Type 2 diabetes mellitus with complication, without long-term current use of insulin (H) E11.8     3. Chronic Kidney Disease, Stage 4 N18.4    4. Bilateral edema of lower extremity R60.0    5. Confusion R41.0        PLAN: Plan at this time no new changes.  Would recommend on next visit by nurse practitioner that we will likely check her basic metabolic profile to continue to monitor her kidneys.  She does not seem to have any issues at this time it seems her tinea pedis has resolved.  Behaviors have been stable at this time and her blood sugars are well controlled.  I will continue to monitor above medical problems and no other changes to care plan at this time.      Total  minutes of which % was spent counseling and coordination of care of the above plan.    Electronically signed by: Sher Monaco DO

## 2021-06-20 NOTE — LETTER
Letter by Kim Reynolds CNP at      Author: Kim Reynolds CNP Service: -- Author Type: --    Filed:  Encounter Date: 9/14/2020 Status: (Other)         Patient: Roseanna Mcmahan   MR Number: [id number]   YOB: 1931   Date of Visit: 9/14/2020     Code Status:  DNR/DNI  Visit Type: FVP Care Coordination - Regulatory     Facility:  [facility] [[id number]]         Facility Type:  (Long Term Care, LTC)    History of Present Illness: Roseanna Mcmahan is a 89 y.o. female who I am seeing today for regulatory visit.  Past medical history includes diabetes type II, hypertension, bipolar depression, anemia of chronic disease, chronic kidney disease and LE edema. DJD of the knees. Today pt sitting up in bedside chair. She continues with decline in cognition. She spends more time in her room. She is able to ambulate with a rolling walker. DM type II. Blood sugars well controlled with Lantus. Bipolar disorder. Symptoms well controlled with Abilify and Wellbutrin. Gout on Allopurinol. No recent flares. Pt with underlying CKD, Creatine 1.87 at baseline. She was treated for UTI back in June.         Active Ambulatory Problems     Diagnosis Date Noted   ? Cerumen Impaction    ? (Lower) Leg Localized Swelling Bilateral    ? Bipolar Disorder    ? Cellulitis    ? Type 2 Diabetes Mellitus    ? Chronic Kidney Disease With Benign Hypertension    ? Osteoarthritis Of The Knee    ? Chronic Kidney Disease, Stage 4    ? Bipolar I disorder, most recent episode (or current) depressed, mild 08/11/2014   ? Adjustment disorder with mixed anxiety and depressed mood 08/11/2014   ? Bipolar I disorder, most recent episode (or current) depressed, mild 10/13/2014   ? Bipolar I disorder, most recent episode (or current) depressed, in partial or unspecified remission 04/13/2015   ? Intertrigo 12/14/2015     Resolved Ambulatory Problems     Diagnosis Date Noted   ? No Resolved Ambulatory Problems     No  Additional Past Medical History     Current Outpatient Medications   Medication Sig   ? acetaminophen (TYLENOL) 500 MG tablet Take 500 mg by mouth every 6 (six) hours as needed for pain.          ? allopurinol (ZYLOPRIM) 100 MG tablet Take 100 mg by mouth daily.    ? ARIPiprazole (ABILIFY) 10 MG tablet Take 1 tablet (10 mg total) by mouth daily.   ? buPROPion (WELLBUTRIN XL) 150 MG 24 hr tablet Take 1 tablet (150 mg total) by mouth every morning.   ? glucose 4 GM chewable tablet Chew 16 g daily as needed for low blood sugar.   ? insulin detemir U-100 (LEVEMIR) 100 unit/mL injection Inject 18 Units under the skin at bedtime.   ? latanoprost (XALATAN) 0.005 % ophthalmic solution Administer 1 drop to both eyes at bedtime.   ? nystatin (MYCOSTATIN) powder Apply 1 application topically 3 (three) times a day as needed.    ? triamcinolone (KENALOG) 0.1 % lotion Apply 1 application topically 2 (two) times a day as needed. Apply small amount to upper arms and back as needed.           No Known Allergies    Review of Systems   Somewhat of poor historian.  No fever or chills. No headache, lightheadedness or dizziness.  Patient denies SOB, no chest pains or palpitations. No nausea, vomiting, constipation or diarrhea.  Patient denies any dysuria, frequency, burning or pain with urination. Otherwise review of systems are negative.         PHYSICAL EXAMINATION:  General: Patient sitting up in bedside chair.   VS: /85, pulse 96, respirations 20, temperature 98.5, O2 sat 99% RA.  Weight 174.3 pounds.  HEENT:moist oral mucosa.  Severe hard of hearing.  No aids.  She does wear dentures which are loose in her mouth.  Facial symmetry.  Tongue is midline.  NECK: Supple  CARDIOVASCULAR: S1, S2 without murmur or gallop.   RESPIRATORY: No wheezing, rales or rhonchi.   ABDOMEN: Obese.  Soft nondistended with positive bowel sounds x4.  EXTREMITIES: Movesboth upper and lower extremities with generalized weakness, trace pedal edema, no  calf tenderness.   equal.  Patient able to overcome gravity.  NEUROLOGIC: Intact  PSYCHIATRIC: Advanced cognitive impairment with decline.      Assessment and Plan:  1. Type 2 diabetes mellitus with complication, without long-term current use of insulin (H)   blood pressure satisfactory controlled on Lantus.   2. Chronic Kidney Disease, Stage 4   creatinine at baseline 1.8.  Follow-up CBC and BMP.   3. Bipolar affective disorder, remission status unspecified (H)   symptoms well controlled with Abilify and Wellbutrin.  She does continue to decline cognitively.   4. Recurrent UTI   no current symptoms.  Last treated back in June.   5.   Gout  continues on allopurinol.     Case Management:  I have reviewed the facility/SNF care plan/MDS which was done 8/20/2020, including the falls risk, nutrition and pain screening. I also reviewed the current immunizations, and preventive care.. Future cancer screening is not clinically indicated secondary to age/goals of care.   Patient's desire to return to the community is not assessible due to cognitive impairment.      Electronically signed by: Kim Reynolds, JUANI

## 2021-06-20 NOTE — LETTER
Letter by Kim Reynolds CNP at      Author: Kim Reynolds CNP Service: -- Author Type: --    Filed:  Encounter Date: 4/20/2020 Status: (Other)         Patient: Roseanna Mcmahan   MR Number: [id number]   YOB: 1931   Date of Visit: 4/20/2020     Code Status:  DNR/DNI  Visit Type: FVP Care Coordination - Regulatory     Facility:  [facility] [[id number]]         Facility Type:  (Long Term Care, LTC)    History of Present Illness: Roseanna Mcmahan is a 89 y.o. female who I am seeing today for regulatory visit.   Pt with hx of diabetes type II, hypertension, bipolar depression, anemia of chronic disease, chronic kidney disease and LE edema. DJD of the knees.  Patient with recurrent UTI.  Initially treated  on 4/9 for UTI. Patient presented with increased confusion and lethargy.  She also had low-grade temps.  Urine culture was positive for greater than 100,000 E. coli.  Late last week patient presented with-like symptoms.  She also had low-grade temp.  UA UC collected.  Patient had completed her antibiotic therapy.  Urine culture ,000 enterococcus species.  Today nursing staff report patient  symptoms improving.  Patient afebrile.  Patient continues on Bactrim DS twice daily with stop date of 4/26/2020.  Patient continues to ambulate with a rolling walker.  DJD of multiple joints.  PRN Tylenol available.  Patient with underlying bipolar disorder.  Symptoms well controlled with Abilify and Wellbutrin.  Diabetes mellitus type 2.  Blood sugar satisfactory on Levemir.  History of gout on allopurinol. Increasing cognitive impairment noted.      Active Ambulatory Problems     Diagnosis Date Noted   ? Cerumen Impaction    ? (Lower) Leg Localized Swelling Bilateral    ? Bipolar Disorder    ? Cellulitis    ? Type 2 Diabetes Mellitus    ? Chronic Kidney Disease With Benign Hypertension    ? Osteoarthritis Of The Knee    ? Chronic Kidney Disease, Stage 4    ? Bipolar I  disorder, most recent episode (or current) depressed, mild 08/11/2014   ? Adjustment disorder with mixed anxiety and depressed mood 08/11/2014   ? Bipolar I disorder, most recent episode (or current) depressed, mild 10/13/2014   ? Bipolar I disorder, most recent episode (or current) depressed, in partial or unspecified remission 04/13/2015   ? Intertrigo 12/14/2015     Resolved Ambulatory Problems     Diagnosis Date Noted   ? No Resolved Ambulatory Problems     No Additional Past Medical History     Current Outpatient Medications   Medication Sig   ? acetaminophen (TYLENOL) 500 MG tablet Take 500 mg by mouth every 6 (six) hours as needed for pain.          ? allopurinol (ZYLOPRIM) 100 MG tablet Take 100 mg by mouth daily.    ? ARIPiprazole (ABILIFY) 10 MG tablet Take 1 tablet (10 mg total) by mouth daily.   ? buPROPion (WELLBUTRIN XL) 150 MG 24 hr tablet Take 1 tablet (150 mg total) by mouth every morning.   ? glucose 4 GM chewable tablet Chew 16 g daily as needed for low blood sugar.   ? insulin detemir U-100 (LEVEMIR) 100 unit/mL injection Inject 18 Units under the skin at bedtime.   ? latanoprost (XALATAN) 0.005 % ophthalmic solution Administer 1 drop to both eyes at bedtime.   ? nystatin (MYCOSTATIN) powder Apply 1 application topically 3 (three) times a day as needed.    ? triamcinolone (KENALOG) 0.1 % lotion Apply 1 application topically 2 (two) times a day as needed. Apply small amount to upper arms and back as needed.           No Known Allergies    Review of Systems   No fever or chills. No headache, lightheadedness or dizziness. No SOB, chest pains or palpitations. No nausea, vomiting, constipation or diarrhea.  Patient denies any dysuria, frequency, burning or pain with urination. Foot rash healed.  Otherwise review of systems are negative.         PHYSICAL EXAMINATION:  General: Patient sitting up in bedside chair. Pleasant.    VS: /83, pulse 90, respiration 16, temperature 97.8, O2 sat 93% RA.   Weight 179.3 pounds.  HEENT:moist oral mucosa.  Severe hard of hearing.  No aids.  She does wear dentures.  NECK: Supple  ABDOMEN: Obese.     EXTREMITIES: Good range of motion on both upper and lower extremities, trace pedal edema, no calf tenderness.   NEUROLOGIC: Intact  PSYCHIATRIC: Increasing cognitive impairment.     Limited physical assessment secondary to COVID-19 precautions.    Assessment and Plan:  1. Recurrent UTI   continue Bactrim DS was started 4/26/2020.  Encourage fluids.   2. Type 2 diabetes mellitus with complication, without long-term current use of insulin (H)   well-controlled on Levemir.   3. Bipolar affective disorder, remission status unspecified (H)   well-controlled with Abilify and Wellbutrin.   4. Chronic Kidney Disease, Stage 4   stable.   5. Bilateral edema of lower extremity   improved.  No longer requiring Lasix.     Case Management:  I have reviewed the facility/SNF care plan/MDS which was done 2/27/2020, including the falls risk, nutrition and pain screening. I also reviewed the current immunizations, and preventive care.. Future cancer screening is not clinically indicated secondary to age/goals of care.   Patient's desire to return to the community is present, but is not able due to care needs .      Electronically signed by: Kim Reynolds, JUANI

## 2021-06-20 NOTE — PROGRESS NOTES
Correct pharmacy verified with patient and confirmed in snapshot? [x] yes []no    Charge captured ? [x] yes  [] no    Medications Phoned  to Pharmacy [] yes [x]no  Name of Pharmacist:  List Medications, including dose, quantity and instructions      Medication Prescriptions given to patient   [] yes  [x] no   List the name of the drug the prescription was written for.       Medications ordered this visit were e-scribed.  Verified by order class [x] yes  [] no    Medication changes or discontinuations were communicated to patient's pharmacy: [] yes  [x] no, no medication discontinued.    UA collected [] yes  [x] no    [place] Prescription Monitoring Program Reviewed? [x] yes  [] no    Referrals were made to:  No medication to monitor on the [place] .    Future appointment was made: [x] yes  [] no    Dictation completed at time of chart check: [] yes  [x] no    I have checked the documentation for today s encounters and the above information has been reviewed and completed.

## 2021-06-20 NOTE — LETTER
Letter by Kim Reynolds CNP at      Author: Kim Reynolds CNP Service: -- Author Type: --    Filed:  Encounter Date: 6/15/2020 Status: (Other)         Patient: Roseanna Mcmahan   MR Number: [id number]   YOB: 1931   Date of Visit: 6/15/2020     Code Status:  DNR/DNI  Visit Type: Problem Visit (Acute on CKD)     Facility:  [facility] [[id number]]         Facility Type:  (Long Term Care, LTC)    History of Present Illness: Roseanna Mcmahan is a 89 y.o. female who I am seeing today for follow up of recent UTI and acute on chronic kidney disease.  Past medical history includes diabetes type II, hypertension, bipolar depression, anemia of chronic disease, chronic kidney disease and LE edema. DJD of the knees.      Today patient sitting up in bedside chair. Pt received 2 liters of fluid since late last week. She is more alert. Less confusion. She is a febrile. She received 1 dose of IM Rocephin. She continues on oral Cipro. UC positive for >100,000 col/ml Enterobacter cloacae. Pt is eating per usual. Blood pressures improved with fluids. Creatine yesterday 2.38. Previously 2.58. Baseline creatine of 1.6-1.9. Pt denies any pain. Nursing staff attempted to draw repeat labs today and pt refused.       Active Ambulatory Problems     Diagnosis Date Noted   ? Cerumen Impaction    ? (Lower) Leg Localized Swelling Bilateral    ? Bipolar Disorder    ? Cellulitis    ? Type 2 Diabetes Mellitus    ? Chronic Kidney Disease With Benign Hypertension    ? Osteoarthritis Of The Knee    ? Chronic Kidney Disease, Stage 4    ? Bipolar I disorder, most recent episode (or current) depressed, mild 08/11/2014   ? Adjustment disorder with mixed anxiety and depressed mood 08/11/2014   ? Bipolar I disorder, most recent episode (or current) depressed, mild 10/13/2014   ? Bipolar I disorder, most recent episode (or current) depressed, in partial or unspecified remission 04/13/2015   ? Intertrigo  12/14/2015     Resolved Ambulatory Problems     Diagnosis Date Noted   ? No Resolved Ambulatory Problems     No Additional Past Medical History     Current Outpatient Medications   Medication Sig   ? acetaminophen (TYLENOL) 500 MG tablet Take 500 mg by mouth every 6 (six) hours as needed for pain.          ? allopurinol (ZYLOPRIM) 100 MG tablet Take 100 mg by mouth daily.    ? ARIPiprazole (ABILIFY) 10 MG tablet Take 1 tablet (10 mg total) by mouth daily.   ? buPROPion (WELLBUTRIN XL) 150 MG 24 hr tablet Take 1 tablet (150 mg total) by mouth every morning.   ? glucose 4 GM chewable tablet Chew 16 g daily as needed for low blood sugar.   ? insulin detemir U-100 (LEVEMIR) 100 unit/mL injection Inject 18 Units under the skin at bedtime.   ? latanoprost (XALATAN) 0.005 % ophthalmic solution Administer 1 drop to both eyes at bedtime.   ? nystatin (MYCOSTATIN) powder Apply 1 application topically 3 (three) times a day as needed.    ? triamcinolone (KENALOG) 0.1 % lotion Apply 1 application topically 2 (two) times a day as needed. Apply small amount to upper arms and back as needed.           No Known Allergies    Review of Systems   Somewhat of poor historian.  No fever or chills. No headache, lightheadedness or dizziness.  Patient denies SOB, no chest pains or palpitations. No nausea, vomiting, constipation or diarrhea.  Patient denies any dysuria, frequency, burning or pain with urination. Otherwise review of systems are negative.         PHYSICAL EXAMINATION:  General: Patient sitting up in bedside chair.   VS: /61, pulse 78, respirations 16, temperature 98, O2 sat 95 % RA.  Weight 177.1 pounds.  HEENT:moist oral mucosa.  Severe hard of hearing.  No aids.  She does wear dentures which are loose in her mouth.  Facial symmetry.  Tongue is midline.  NECK: Supple  CARDIOVASCULAR: S1, S2 without murmur or gallop.   RESPIRATORY: No wheezing, rales or rhonchi.   ABDOMEN: Obese.     EXTREMITIES: Movesboth upper and lower  extremities with generalized weakness, trace pedal edema, no calf tenderness.   equal.  Patient able to overcome gravity.  NEUROLOGIC: Intact  PSYCHIATRIC: Advanced cognitive impairment. Less confused today.       Assessment and Plan:  1. Recurrent UTI  Continues on Cipro stop date 6/17/20.   2. Acute renal failure superimposed on chronic kidney disease, unspecified CKD stage, unspecified acute renal failure type (H)  Pt received 2 L of fluids.   Creatine yesterday 2.38. Prev 2.58. Baseline 1.6-1.9.   Pt refused lab draw today. Will re attempt in am.   Push fluids orally.    3. Chronic Kidney Disease, Stage 4  See above.      I did speak with pt daughter n law today in regards to overall prognosis and plan of care. Pt may require another liter of fluid. Will await labs.     Electronically signed by: Kim Reynolds, CNP

## 2021-06-20 NOTE — PROGRESS NOTES
Code Status:  DNR/DNI  Visit Type: Problem Visit     Facility:  [facility] [[id number]]         Facility Type:  (Long Term Care, LTC)    History of Present Illness: Roseanna Mcmahan is a 87 y.o. female who I am seeing today for follow-up of right foot wounds.  Pt with hx of diabetes type II, hypertension, bipolar depression, anemia of chronic disease, chronic kidney disease and LE edema. DJD of the knees.  Patient with chronic diabetes with chronic low sensation of the right foot.  It does appear fungal in appearance. I Have tried multiple topical and treatments including antifungal ointment, antifungal powder, Silvadene and calmoseptine.  She was treated with oral antifungal without much relief.  She denies any pain.  She does have significant hammertoe.  Blood sugars are well controlled.  She denies any pain.    Today she is received her first Epsom salts soak. Continues to be red.  She denies any pain.  Continues with topical antifungal.    Active Ambulatory Problems     Diagnosis Date Noted     Cerumen Impaction      (Lower) Leg Localized Swelling Bilateral      Bipolar Disorder      Cellulitis      Type 2 Diabetes Mellitus      Chronic Kidney Disease With Benign Hypertension      Osteoarthritis Of The Knee      Chronic Kidney Disease, Stage 4      Bipolar I disorder, most recent episode (or current) depressed, mild 08/11/2014     Adjustment disorder with mixed anxiety and depressed mood 08/11/2014     Bipolar I disorder, most recent episode (or current) depressed, mild 10/13/2014     Bipolar I disorder, most recent episode (or current) depressed, in partial or unspecified remission 04/13/2015     Intertrigo 12/14/2015     Resolved Ambulatory Problems     Diagnosis Date Noted     No Resolved Ambulatory Problems     No Additional Past Medical History     Meds reviewed at facility.       No Known Allergies    Review of Systems   Patient denies any pain in her foot.  Reports increased itching of  the right foot. Swelling less than usual.    PHYSICAL EXAMINATION:  General: Awake, Alert, oriented x3, appropriately, follows simple commands, conversant. Pt sitting up in recliner.    HEENT:moist oral mucosa.  Severe hard of hearing.  No aids.  She does wear dentures.  EXTREMITIES: Good range of motion on both upper and lower extremities, 1+pedal edema, no calf tenderness. Right foot with significant hammertoe of the second digit.  She is just received her first Epsom salt soak. Area red and moist.   NEUROLOGIC: Intact, pulses palpable  PSYCHIATRIC: Cognition intact.Pleasant affect.      Assessment and Plan:  1. Skin ulcer of right foot, limited to breakdown of skin (H)     2. Tinea pedis     3. Diabetes mellitus (H)         Patient with history of tennis pedis to the right foot.  Patient has a continued with a very red moist lesion to the top of her foot.  She does have underlying diabetes.  Blood sugars are well controlled.  Minimal edema.  Denies any pain.  I did obtain wound culture.  This did grow staph aureus.  I will start Keflex orally 500 mg twice daily.  Continue with Epsom salt soaks daily.  Antifungal topical treatment.        Electronically signed by: Kim Reynolds, CNP

## 2021-06-20 NOTE — PROGRESS NOTES
Code Status:  DNR/DNI  Visit Type: Problem Visit     Facility:  [facility] [[id number]]         Facility Type:  (Long Term Care, LTC)    History of Present Illness: Roseanna Mcmahan is a 87 y.o. female who I am seeing today at the request of the nurses for continued drainage to her right foot.  Pt with hx of diabetes type II, hypertension, bipolar depression, anemia of chronic disease, chronic kidney disease and LE edema. DJD of the knees.  Patient with chronic nonhealing wound to the top of the right foot.  Approximately 1 month ago she cultured positive for staph aureus.  She did complete a 10-day course of Keflex.  Area had improved somewhat.  However now draining profusely.  Red peeling.  Patient reports itching.  She is afebrile.  Diabetes satisfactory controlled.     Active Ambulatory Problems     Diagnosis Date Noted     Cerumen Impaction      (Lower) Leg Localized Swelling Bilateral      Bipolar Disorder      Cellulitis      Type 2 Diabetes Mellitus      Chronic Kidney Disease With Benign Hypertension      Osteoarthritis Of The Knee      Chronic Kidney Disease, Stage 4      Bipolar I disorder, most recent episode (or current) depressed, mild 08/11/2014     Adjustment disorder with mixed anxiety and depressed mood 08/11/2014     Bipolar I disorder, most recent episode (or current) depressed, mild 10/13/2014     Bipolar I disorder, most recent episode (or current) depressed, in partial or unspecified remission 04/13/2015     Intertrigo 12/14/2015     Resolved Ambulatory Problems     Diagnosis Date Noted     No Resolved Ambulatory Problems     No Additional Past Medical History     Meds reviewed at facility.       No Known Allergies    Review of Systems   Patient denies any pain in her foot.  Reports increased itching of the right foot. Swelling less than usual.    PHYSICAL EXAMINATION:  General: Awake, Alert, sitting up in recliner.   VS: /85, heart rate 108, respirations 22,  temperature 98.  HEENT:moist oral mucosa.  Severe hard of hearing.  No aids.  She does wear dentures.  EXTREMITIES: Good range of motion on both upper and lower extremities, 1+pedal edema, no calf tenderness.   SKIN: Skin over top of the foot draining a moderate amount of serous drainage.  Area peeling and red.  She does report itching.  NEUROLOGIC: Intact, pulses palpable  PSYCHIATRIC: Cognition intact.Pleasant affect.      Assessment and Plan:  1. Skin ulcer of right foot, limited to breakdown of skin (H)     2. Diabetic foot infection (H)     3. Tinea pedis       Patient with history of diabetes.  Chronic nonhealing ulcer on the right foot.  She did test positive for staph aureus.  She completed a 10-day course of Keflex.  Area had improved.  She was being treated with topical treatment and Epsom salt soaks.  Today increased moderate serous drainage.  Area red and peeling.  She reports itching.  We will again treat her with Levaquin 500 mg daily times 7 days.  Will also treat with Lamisil topically oral Diflucan.  Blood sugars are satisfactory controlled.    Electronically signed by: Kim Reynolds, CNP

## 2021-06-20 NOTE — PROGRESS NOTES
Reason for visit - follow-up, things are going ok, just getting older and things harder to fix, doesn't want to be a burden.  Sister diagnosed with Alzheimers.  Sleep - no issue.  Depression - improved, and no complaints.  Anxiety - strong willed, not an issue.  Panic attacks - none  SI/HI - none  Therapist - none  Side effects from medication - none noted.    [place] medication to [place] on the [place] .

## 2021-06-20 NOTE — PROGRESS NOTES
Code Status:  DNR/DNI  Visit Type: Problem Visit     Facility:  [facility] [[id number]]         Facility Type:  (Long Term Care, LTC)    History of Present Illness: Roseanna Mcmahan is a 87 y.o. female who I am seeing today for follow-up of right foot wounds.  Pt with hx of diabetes type II, hypertension, bipolar depression, anemia of chronic disease, chronic kidney disease and LE edema. DJD of the knees.  Patient with chronic nonhealing wound to the top of the right foot.  Initially patient treated with multiple topical treatments including antifungal for athlete's foot.  Wound culture obtained.  Which was positive for staph aureus.  Patient treated with Keflex 500 mg twice daily for 10 days.  Today is the last dose of her antibiotic therapy.  Today she denies any pain.  She does report itching.  She does wear 2 pair of socks 1 of which is nonskid.  She has diabetic shoes however she does not wear them consistently.    Active Ambulatory Problems     Diagnosis Date Noted     Cerumen Impaction      (Lower) Leg Localized Swelling Bilateral      Bipolar Disorder      Cellulitis      Type 2 Diabetes Mellitus      Chronic Kidney Disease With Benign Hypertension      Osteoarthritis Of The Knee      Chronic Kidney Disease, Stage 4      Bipolar I disorder, most recent episode (or current) depressed, mild 08/11/2014     Adjustment disorder with mixed anxiety and depressed mood 08/11/2014     Bipolar I disorder, most recent episode (or current) depressed, mild 10/13/2014     Bipolar I disorder, most recent episode (or current) depressed, in partial or unspecified remission 04/13/2015     Intertrigo 12/14/2015     Resolved Ambulatory Problems     Diagnosis Date Noted     No Resolved Ambulatory Problems     No Additional Past Medical History     Meds reviewed at facility.       No Known Allergies    Review of Systems   Patient denies any pain in her foot.  Reports increased itching of the right foot. Swelling  less than usual.    PHYSICAL EXAMINATION:  General: Awake, Alert, sitting up in recliner.   VS: /59, heart rate 71, respirations 20, temperature 96.4, O2 sat 99% on room air.  HEENT:moist oral mucosa.  Severe hard of hearing.  No aids.  She does wear dentures.  EXTREMITIES: Good range of motion on both upper and lower extremities, 1+pedal edema, no calf tenderness.  I accompany her back to the room and she is released with a rolling walker.  Gait steady.    SKIN: Wet redness resolved to right upper foot.  She has moderate scabbing and scaling in place.  I did debride the area of moderate non-devitalized tissue.  Only slightly moist between the first and second digits.  NEUROLOGIC: Intact, pulses palpable  PSYCHIATRIC: Cognition intact.Pleasant affect.      Assessment and Plan:  1. Skin ulcer of right foot, limited to breakdown of skin (H)     2. Diabetic foot infection (H)       Patient with recent nonhealing ulcer of the top of right foot.  Wound culture obtained which did culture positive for Staphylococcus aureus.  She has been receiving a flex 500 mg twice daily for 10 days.  She does have underlying diabetes.  Patient previously treated with multiple topical treatments including antifungals.  Today area without redness.  She denies any pain.  She does report itching.  No edema.  Moderate dry and scaling tissue.  This was debrided.  She has slight moisture between the first and second toe.  Some hammertoe present.  Will resume calmoseptine and gauze between first and second digits.  She is wearing double socks.  This was discouraged on today's visit.  We will continue with Epsom salt soaks times 5 days.  Will extend her antibiotic therapy for another 5 days.  Blood sugars well controlled.  She does have diabetic shoes.  Will encourage her to use these.      Electronically signed by: Kim Reynolds, JUANI

## 2021-06-21 NOTE — PROGRESS NOTES
Code Status:  DNR/DNI  Visit Type: Problem Visit     Facility:  [facility] [[id number]]         Facility Type:  (Long Term Care, LTC)    History of Present Illness: Roseanna Mcmahan is a 87 y.o. female who I am seeing today at the request of the nurses for continued drainage to her right foot.  Pt with hx of diabetes type II, hypertension, bipolar depression, anemia of chronic disease, chronic kidney disease and LE edema. DJD of the knees.  Patient with chronic nonhealing wound to the top of the right foot. She does have significant hammer toe there.  Approximately 1 month ago she cultured positive for staph aureus.  She did complete a 10-day course of Keflex. Diabetes satisfactory controlled. Today top of foot without drainage however between the toes very moist with moderate drainage. Significant hammer toe. She is continues on Epsom salt soaks and Topical fungal ointment.     Active Ambulatory Problems     Diagnosis Date Noted     Cerumen Impaction      (Lower) Leg Localized Swelling Bilateral      Bipolar Disorder      Cellulitis      Type 2 Diabetes Mellitus      Chronic Kidney Disease With Benign Hypertension      Osteoarthritis Of The Knee      Chronic Kidney Disease, Stage 4      Bipolar I disorder, most recent episode (or current) depressed, mild 08/11/2014     Adjustment disorder with mixed anxiety and depressed mood 08/11/2014     Bipolar I disorder, most recent episode (or current) depressed, mild 10/13/2014     Bipolar I disorder, most recent episode (or current) depressed, in partial or unspecified remission 04/13/2015     Intertrigo 12/14/2015     Resolved Ambulatory Problems     Diagnosis Date Noted     No Resolved Ambulatory Problems     No Additional Past Medical History     Meds reviewed at facility.       No Known Allergies    Review of Systems   Patient denies any pain in her foot.  Reports increased itching of the right foot. Swelling less than usual.    PHYSICAL  EXAMINATION:  General: Awake, Alert, sitting up in recliner.   VS: /77, pulse 76, respirations 18, temperature 97, O2 sat 96% on room air.  HEENT:moist oral mucosa.  Severe hard of hearing.  No aids.  She does wear dentures.  EXTREMITIES: Good range of motion on both upper and lower extremities, 1+pedal edema, no calf tenderness.   SKIN: Area over top of foot less red on todays feet. However between toes very moist and wet. She does report itching.  NEUROLOGIC: Intact, pulses palpable  PSYCHIATRIC: Cognition intact.Pleasant affect.      Assessment and Plan:  1. Skin ulcer of right foot, limited to breakdown of skin (H)     2. Diabetic foot infection (H)       Patient with history of diabetes.  Chronic nonhealing ulcer on the right foot.  She did test positive for staph aureus.  She completed a 10-day course of Keflex about a month ago. Diabetes well controlled. She has continued with Episom salt soaks and topical antifungal. Today moderate drainage between the toes. I will d/c Episom salt soaks and topical cream. Start Interdry AG and weave between toes change daily.     Electronically signed by: Kim Reynolds, CNP

## 2021-06-21 NOTE — PROGRESS NOTES
Code Status:  DNR/DNI  Visit Type: Problem Visit     Facility:  [facility] [[id number]]         Facility Type:  (Long Term Care, LTC)    History of Present Illness: Roseanna Mcmahan is a 87 y.o. female who I am seeing today at the request of the nurses for her nonhealing wound on her right foot. Pt with hx of diabetes type II, hypertension, bipolar depression, anemia of chronic disease, chronic kidney disease and LE edema. DJD of the knees.  Patient with chronic nonhealing wound to the top of the right foot. She does have significant hammer toe there.  Approximately 1 month ago she cultured positive for staph aureus.  She did complete a 10-day course of Keflex. Diabetes satisfactory controlled.     Patient continues with red right foot.  Somewhat improved on today's visit.  She is continued on Bactrim DS.  Moderate amount of serous drainage.  Patient denies any pain.  She is afebrile.  No elevated white count.  Recent uric acid level within normal limits.    Active Ambulatory Problems     Diagnosis Date Noted     Cerumen Impaction      (Lower) Leg Localized Swelling Bilateral      Bipolar Disorder      Cellulitis      Type 2 Diabetes Mellitus      Chronic Kidney Disease With Benign Hypertension      Osteoarthritis Of The Knee      Chronic Kidney Disease, Stage 4      Bipolar I disorder, most recent episode (or current) depressed, mild 08/11/2014     Adjustment disorder with mixed anxiety and depressed mood 08/11/2014     Bipolar I disorder, most recent episode (or current) depressed, mild 10/13/2014     Bipolar I disorder, most recent episode (or current) depressed, in partial or unspecified remission 04/13/2015     Intertrigo 12/14/2015     Resolved Ambulatory Problems     Diagnosis Date Noted     No Resolved Ambulatory Problems     No Additional Past Medical History     Meds reviewed at facility.       No Known Allergies    Review of Systems   Refer to HPI.     PHYSICAL EXAMINATION:  General:  Awake, Alert, sitting up in recliner.   VS: /77, heart rate 74, respirations 24, temperature 98.3, O2 sat 97% on room air.  HEENT:moist oral mucosa.  Severe hard of hearing.  No aids.  She does wear dentures.  EXTREMITIES: Good range of motion on both upper and lower extremities, 1+pedal edema, no calf tenderness.   SKIN: Right foot with red, peeling skin slightly improved on today's exam.. Moderate serous drainage to dressing.  NEUROLOGIC: Intact, pulses palpable  PSYCHIATRIC: Cognition intact.Pleasant affect.      Assessment and Plan:  1. Diabetic foot infection (H)     2. Tinea pedis       I will DC her Bactrim.  Will start clindamycin 300 mg every 12 hours.  Continue with topical treatment.  I will also treat her with Diflucan 150 mg daily x3 doses every 72 hours.  She does have a follow-up with dermatology.  Continue topical treatment.      Electronically signed by: Kim Reynolds, JUANI

## 2021-06-21 NOTE — PROGRESS NOTES
Code Status:  DNR/DNI  Visit Type: Problem Visit     Facility:  [facility] [[id number]]         Facility Type:  (Long Term Care, LTC)    History of Present Illness: Roseanna Mcmahan is a 87 y.o. female who I am seeing today at the request of the nurses for her nonhealing wound on her right foot. Pt with hx of diabetes type II, hypertension, bipolar depression, anemia of chronic disease, chronic kidney disease and LE edema. DJD of the knees.  Patient with chronic nonhealing wound to the top of the right foot. She does have significant hammer toe there.  Approximately 1 month ago she cultured positive for staph aureus.  She did complete a 10-day course of Keflex. Diabetes satisfactory controlled.     Today right foot bright red and warm to touch. She has moderate amount of serous drainage to old dressing. The skin is peeling. She reports mild pain. She is a-febrile.     Active Ambulatory Problems     Diagnosis Date Noted     Cerumen Impaction      (Lower) Leg Localized Swelling Bilateral      Bipolar Disorder      Cellulitis      Type 2 Diabetes Mellitus      Chronic Kidney Disease With Benign Hypertension      Osteoarthritis Of The Knee      Chronic Kidney Disease, Stage 4      Bipolar I disorder, most recent episode (or current) depressed, mild 08/11/2014     Adjustment disorder with mixed anxiety and depressed mood 08/11/2014     Bipolar I disorder, most recent episode (or current) depressed, mild 10/13/2014     Bipolar I disorder, most recent episode (or current) depressed, in partial or unspecified remission 04/13/2015     Intertrigo 12/14/2015     Resolved Ambulatory Problems     Diagnosis Date Noted     No Resolved Ambulatory Problems     No Additional Past Medical History     Meds reviewed at facility.       No Known Allergies    Review of Systems   Refer to HPI.     PHYSICAL EXAMINATION:  General: Awake, Alert, sitting up in recliner.   VS: /92, P 75, R 20, T 97.2.   HEENT:moist oral  mucosa.  Severe hard of hearing.  No aids.  She does wear dentures.  EXTREMITIES: Good range of motion on both upper and lower extremities, 1+pedal edema, no calf tenderness.   SKIN: Right foot bright red with peeling skin. Moderate serous drainage to old dressing. No odor. Warm to touch.   NEUROLOGIC: Intact, pulses palpable  PSYCHIATRIC: Cognition intact.Pleasant affect.      Assessment and Plan:  1. Skin ulcer of right foot, limited to breakdown of skin (H)     2. Diabetic foot infection (H)     3. Tinea pedis       Chronic non healing ulcer to top of right foot. She was previously treated for staph infection in her foot about 1 month ago. She has been receiving Interdry AG to her foot due to increased moisture with peeling. I will culture her foot today and rule out infection. Resume Epsom salt soaks x 5 days. Follow up with dermatology. She has been treated with mx topicals including steroids and antifungals.     Electronically signed by: Kim Reynolds, JUANI

## 2021-06-21 NOTE — LETTER
Letter by Kim Reynolds CNP at      Author: Kim Reynolds CNP Service: -- Author Type: --    Filed:  Encounter Date: 12/21/2020 Status: (Other)         Patient: Roseanna Mcmahan   MR Number: [id number]   YOB: 1931   Date of Visit: 12/21/2020     Code Status:  DNR/DNI  Visit Type: Problem Visit (Covid follow-up)     Facility:  [facility] [[id number]]         Facility Type:  (Long Term Care, LTC)    History of Present Illness: Roseanna Mcmahan is a 89 y.o. female who I am seeing today for recent diagnosis of Covid.  Past medical history includes diabetes type II, hypertension, bipolar depression, anemia of chronic disease, chronic kidney disease and LE edema. DJD of the knees.     Today pt lying in bedPatient recently tested positive on 12/15/2020 with massive testing throughout the facilit. She appears very fatigued. She arouses but falls back asleep. Labs today showed creatine of 2.19 with BUN or 27. She appears dry. She has had poor oral intake per the nursing staff. She also had nausea and loose stools yesterday per the nursing staff. No loose stools today. She reports some shortness of breath. She is currently a febrile. She denies any cough. Underlying underlying diabetes mellitus type 2.  She continues on Lantus.  Recently I decrease this.  Due to her poor oral intake.  She does continue on sliding scale. Underlying chronic kidney disease with creatinine around 1.8 at baseline. History of bipolar disorder on Abilify and Wellbutrin.  History of gout on allopurinol.     Active Ambulatory Problems     Diagnosis Date Noted   ? Cerumen Impaction    ? (Lower) Leg Localized Swelling Bilateral    ? Bipolar Disorder    ? Cellulitis    ? Type 2 Diabetes Mellitus    ? Chronic Kidney Disease With Benign Hypertension    ? Osteoarthritis Of The Knee    ? Chronic Kidney Disease, Stage 4    ? Bipolar I disorder, most recent episode (or current) depressed, mild 08/11/2014   ?  Adjustment disorder with mixed anxiety and depressed mood 08/11/2014   ? Bipolar I disorder, most recent episode (or current) depressed, mild 10/13/2014   ? Bipolar I disorder, most recent episode (or current) depressed, in partial or unspecified remission 04/13/2015   ? Intertrigo 12/14/2015   ? Obesity (BMI 35.0-39.9) with comorbidity (H) 11/04/2020     Resolved Ambulatory Problems     Diagnosis Date Noted   ? No Resolved Ambulatory Problems     No Additional Past Medical History     Current Outpatient Medications   Medication Sig   ? allopurinol (ZYLOPRIM) 100 MG tablet Take 100 mg by mouth daily.    ? ARIPiprazole (ABILIFY) 10 MG tablet Take 1 tablet (10 mg total) by mouth daily.   ? buPROPion (WELLBUTRIN XL) 150 MG 24 hr tablet Take 1 tablet (150 mg total) by mouth every morning.   ? glucose 4 GM chewable tablet Chew 16 g daily as needed for low blood sugar.   ? insulin glargine (LANTUS SOLOSTAR PEN) 100 unit/mL (3 mL) pen Inject 18 Units under the skin at bedtime.   ? latanoprost (XALATAN) 0.005 % ophthalmic solution Administer 1 drop to both eyes at bedtime.   ? nystatin (MYCOSTATIN) powder Apply 1 application topically 3 (three) times a day as needed.    ? triamcinolone (KENALOG) 0.1 % lotion Apply 1 application topically 2 (two) times a day as needed. Apply small amount to upper arms and back as needed.           No Known Allergies    Review of Systems   Somewhat of poor historian.  No fever or chills. No headache, lightheadedness or dizziness.  Patient reports SOB, no cough, no chest pains or palpitations. +nausea, vomiting, constipation, +diarrhea. Poor oral intake.  Patient denies any dysuria, frequency, burning or pain with urination. Otherwise review of systems are negative.         PHYSICAL EXAMINATION:  General: Patient lying in bed.  Appears fatigued.   VS: /78, pulse 83, respirations 16, temperature 98.1, O2 sat 90% RA.  Weight 174.3 pounds.  HEENT: Moist oral mucosa.  Severe hard of hearing.   No aids.  She does wear dentures which are loose in her mouth.  Facial symmetry.  Tongue is midline.  NECK: Supple  CARDIOVASCULAR: S1, S2 without murmur or gallop.   RESPIRATORY: No wheezing, rales or rhonchi.  No cough on exam.  ABDOMEN: Obese. Soft nondistended with positive bowel sounds x4.  EXTREMITIES: Movesboth upper and lower extremities with generalized weakness, trace pedal edema, no calf tenderness.    NEUROLOGIC: Intact  PSYCHIATRIC: Advanced cognitive impairment with decline.      Assessment and Plan:  1. 2019 novel coronavirus disease (COVID-19)   Pt now reporting shortness of breath.   Obtain CXR 2 view.  No fever, chills or cough. No hypoxia currently .    2. Type 2 diabetes mellitus with complication, without long-term current use of insulin (H)  Continue Lantus to 10 units.  Hold if less than 50% of meal consumed.  Increase blood sugar checks to 3 times daily.   3. Chronic Kidney Disease, Stage 4   baseline 1.8. Pt recently given fluids. Today  Creatine 2.13. Give additional liter of fluids. Follow up BMP in ma.    4.  Nausea  Zofran 8 mg two times a day.    5.  Loose stools  Give imodium 2 mg four times a day prn .   6. Bipolar affective disorder, remission status unspecified (H)   continues on Abilify and Wellbutrin.         Electronically signed by: Kim Reynolds CNP

## 2021-06-21 NOTE — PROGRESS NOTES
Code Status:  DNR/DNI  Visit Type: Problem Visit     Facility:  [facility] [[id number]]         Facility Type:  (Long Term Care, LTC)    History of Present Illness: Roseanna Mcmahan is a 87 y.o. female who I am seeing today at the request of the nurses for her nonhealing wound on her right foot. Pt with hx of diabetes type II, hypertension, bipolar depression, anemia of chronic disease, chronic kidney disease and LE edema. DJD of the knees.  Patient with chronic nonhealing wound to the top of the right foot. She does have significant hammer toe there.  Approximately 1 month ago she cultured positive for staph aureus.  She did complete a 10-day course of Keflex. Diabetes satisfactory controlled.     Patient sitting up in bedside chair.She continues with redness, peeling and drainage to right foot despite mx topical treatments and oral antibiotics and antifungals. DM satisfactory controlled. She reports no burning but does complain of itching.     Active Ambulatory Problems     Diagnosis Date Noted     Cerumen Impaction      (Lower) Leg Localized Swelling Bilateral      Bipolar Disorder      Cellulitis      Type 2 Diabetes Mellitus      Chronic Kidney Disease With Benign Hypertension      Osteoarthritis Of The Knee      Chronic Kidney Disease, Stage 4      Bipolar I disorder, most recent episode (or current) depressed, mild 08/11/2014     Adjustment disorder with mixed anxiety and depressed mood 08/11/2014     Bipolar I disorder, most recent episode (or current) depressed, mild 10/13/2014     Bipolar I disorder, most recent episode (or current) depressed, in partial or unspecified remission 04/13/2015     Intertrigo 12/14/2015     Resolved Ambulatory Problems     Diagnosis Date Noted     No Resolved Ambulatory Problems     Past Medical History:   Diagnosis Date     (Lower) Leg Localized Swelling Bilateral      Adjustment disorder with mixed anxiety and depressed mood 8/11/2014     Bipolar Disorder       Bipolar I disorder, most recent episode (or current) depressed, in partial or unspecified remission 4/13/2015     Bipolar I disorder, most recent episode (or current) depressed, mild 8/11/2014     Cellulitis      Cerumen Impaction      Chronic Kidney Disease With Benign Hypertension      Chronic Kidney Disease, Stage 4      Intertrigo 12/14/2015     Osteoarthritis Of The Knee      Type 2 Diabetes Mellitus      Meds reviewed at facility.       No Known Allergies    Review of Systems   Refer to HPI.     PHYSICAL EXAMINATION:  General: Awake, Alert, sitting up in recliner.   VS: /71, heart rate 85, respirations 17, temperature 98.5, O2 sat 96% on room air.    HEENT:moist oral mucosa.  Severe hard of hearing.  No aids.  She does wear dentures.  EXTREMITIES: Good range of motion on both upper and lower extremities, 1+pedal edema, no calf tenderness.   SKIN: Right foot with red, peeling skin. Moderate serous drainage to dressing. Moderate scabbing and old blood behind great toe.   NEUROLOGIC: Intact, pulses palpable  PSYCHIATRIC: Cognition intact.Pleasant affect.      Assessment and Plan:  1. Diabetic foot infection (H)     2. Tinea pedis of left foot       Pt continues on  clindamycin 300 mg every 12 hours.  Continue with topical treatment. She has completed her with Diflucan. Dermatology appt canceled and Vascular consult ordered. She has appointment tomorrow. Continue current tx for now. Family notified of changes.     Electronically signed by: Kim Reynolds CNP

## 2021-06-21 NOTE — LETTER
Letter by Kim Reynolds CNP at      Author: Kmi Reynolds CNP Service: -- Author Type: --    Filed:  Encounter Date: 12/23/2020 Status: (Other)         Patient: Roseanna Mcmahan   MR Number: [id number]   YOB: 1931   Date of Visit: 12/23/2020     Code Status:  DNR/DNI  Visit Type: Problem Visit (COVID follow up )     Facility:  [facility] [[id number]]         Facility Type:  (Long Term Care, LTC)    History of Present Illness: Roseanna Mcmahan is a 89 y.o. female who I am seeing today for follow up of recent diagnosis of Covid.  Past medical history includes diabetes type II, hypertension, bipolar depression, anemia of chronic disease, chronic kidney disease and LE edema. DJD of the knees.     Today pt lying in bed. Patient recently tested positive on 12/15/2020 with massive testing throughout the facilit. Pt very difficult to arouse. She continues with low grade temperature of 99. She was given Tylenol suppository this a.m.  Patient with acute on chronic kidney failure.  Creatinine today 2.33.  She has received 2 L of fluid.  Last evening she had acute onset of hypoxia with sats at 83%.  Recent chest x-ray was negative.  She has continued with cough.  She was given a loading dose of IM Rocephin with lidocaine yesterday.  She does have history of chronic UTI.  UA UC was obtained yesterday.  Patient with very poor oral intake.  She has not taken her meds in 2 days.  She has underlying diabetes.  She is usually on Lantus however this has been held x3 days secondary to less than 50% of meals consumed.  She has had some low blood sugars.  She initially had some loose stools however this improved with Imodium.  She has also had some nausea.  She continues on Compazine and Zofran.        Active Ambulatory Problems     Diagnosis Date Noted   ? Cerumen Impaction    ? (Lower) Leg Localized Swelling Bilateral    ? Bipolar Disorder    ? Cellulitis    ? Type 2 Diabetes Mellitus     ? Chronic Kidney Disease With Benign Hypertension    ? Osteoarthritis Of The Knee    ? Chronic Kidney Disease, Stage 4    ? Bipolar I disorder, most recent episode (or current) depressed, mild 08/11/2014   ? Adjustment disorder with mixed anxiety and depressed mood 08/11/2014   ? Bipolar I disorder, most recent episode (or current) depressed, mild 10/13/2014   ? Bipolar I disorder, most recent episode (or current) depressed, in partial or unspecified remission 04/13/2015   ? Intertrigo 12/14/2015   ? Obesity (BMI 35.0-39.9) with comorbidity (H) 11/04/2020   ? AMS (altered mental status) 12/23/2020     Resolved Ambulatory Problems     Diagnosis Date Noted   ? No Resolved Ambulatory Problems     Past Medical History:   Diagnosis Date   ? Diabetes mellitus, type II (H)      No current outpatient medications on file.           No Known Allergies    Review of Systems   Pt unable to verbalize today.         PHYSICAL EXAMINATION:  General: Patient lying in bed.  Lethargic, difficult to arouse.   VS: /88, pulse 114, respirations 16, temperature 97.5, O2 sat 91% RA.  Weight 174.3 pounds.    HEENT:  Dry oral mucosa. Severe hard of hearing.  No aids.  She does wear dentures but they have been removed.    NECK: Supple  CARDIOVASCULAR: S1, S2 without murmur or gallop.   RESPIRATORY: No wheezing, rales or rhonchi. Shallow respiratory effort.  No cough on exam. O2 on 2L NC.   ABDOMEN: Obese. Soft nondistended with positive bowel sounds x4.  EXTREMITIES: Movesboth upper and lower extremities with diffuse weakness.   NEUROLOGIC: Intact  PSYCHIATRIC: Advanced cognitive impairment with decline.      Assessment and Plan:  1. 2019 novel coronavirus disease (COVID-19)  CXR yesterday was negative. Continue O2 at 1-3 liters to keep sats > 90%.   Low-grade temp of 99.   1 gm IM Rocephin given yesterday.    2. Type 2 diabetes mellitus with complication, without long-term current use of insulin (H)  D/c Lantus.Pt has not received in 3  days due to poor oral intake.  Start sliding scale insulin with meals.Hold if less than 50% of meal consumed.  Continue blood sugar checks to 3 times daily.   3. Chronic Kidney Disease, Stage 4   baseline 1.8. Today Creatine 2.33. Pt has received 2 liters of fluid.   UA UC pending.    4.  Nausea  Zofran 8 mg two times a day.    5.  Loose stools  Give imodium 2 mg four times a day prn .   6. Bipolar affective disorder, remission status unspecified (H)   continues on Abilify and Wellbutrin.     Pt continues to decline. She is now hypoxic requiring oxygen and difficult to arouse. At this time I suggest hospitalization for further eval. Son notified. Ok to send to [facility] for further eval.       Electronically signed by: Kim Reynolds, JUANI

## 2021-06-21 NOTE — LETTER
Letter by Sher Monaco DO at      Author: Sher Monaco DO Service: -- Author Type: --    Filed:  Encounter Date: 12/31/2020 Status: (Other)         Patient: Roseanna Mcmahan   MR Number: [id number]   YOB: 1931   Date of Visit: 12/31/2020     [facility]      Facility:    [facility] [[id number]]  Code Status: DNR/DNI      Chief Complaint/Reason for Visit:  Chief Complaint   Patient presents with   ? H & P     Altered mental status from nova coronavirus, acute respiratory failure with hypoxemia type 2 diabetes, acute encephalopathy.       HPI:   Roseanna is a 89 y.o. female who patient admitted to the hospital on December 23, 2020.  She was admitted from the nursing home with acute respiratory failure failure in COVID-19.  She had metabolic encephalopathy was decided to send in the hospital for trial of remdesivir.  She already has had the appropriate care of here for COVID-19 but she continued to decline.  She did have a Covid pneumonia and respiratory acidosis.  Her prognosis is very poor and was changed to full comfort care and currently on hospice care at this time.  She was transferred back here at [facility].    She is lying in bed does appear comfortable at this time apparently hospice has been here this morning and she is currently on morphine as well as lorazepam.  She does not answer questions and she is not arousable at this time.    Past Medical History:  Past Medical History:   Diagnosis Date   ? (Lower) Leg Localized Swelling Bilateral     Created by Conversion    ? Adjustment disorder with mixed anxiety and depressed mood 8/11/2014   ? Bipolar Disorder     Created by Conversion  Replacement Utility updated for latest IMO load   ? Bipolar I disorder, most recent episode (or current) depressed, in partial or unspecified remission 4/13/2015   ? Bipolar I disorder, most recent episode (or current) depressed, mild 8/11/2014     Replacement  Utility updated for latest IMO load   ? Cellulitis     Created by Conversion    ? Cerumen Impaction     Created by Conversion    ? Chronic Kidney Disease With Benign Hypertension     Created by Conversion    ? Chronic Kidney Disease, Stage 4     Created by Conversion    ? Diabetes mellitus, type II (H)    ? Intertrigo 2015   ? Osteoarthritis Of The Knee     Created by Conversion  Replacement Utility updated for latest IMO load   ? Type 2 Diabetes Mellitus     Created by Conversion            Surgical History:  Past Surgical History:   Procedure Laterality Date   ? wrist surgery Left        Family History:   Family History   Problem Relation Age of Onset   ? Diabetes Sister    ? Cancer Sister    ? Diabetes Brother    ? Cancer Brother    ? Diabetes Son    ? Diabetes Sister    ? Cancer Son         colon cancer       Social History:    Social History     Socioeconomic History   ? Marital status:      Spouse name: None   ? Number of children: None   ? Years of education: None   ? Highest education level: None   Occupational History   ? None   Social Needs   ? Financial resource strain: None   ? Food insecurity     Worry: None     Inability: None   ? Transportation needs     Medical: None     Non-medical: None   Tobacco Use   ? Smoking status: Former Smoker     Types: Cigarettes     Start date: 1949     Quit date: 1990     Years since quittin.6   ? Smokeless tobacco: Never Used   Substance and Sexual Activity   ? Alcohol use: No     Frequency: Never     Comment: rare   ? Drug use: No   ? Sexual activity: Never   Lifestyle   ? Physical activity     Days per week: None     Minutes per session: None   ? Stress: None   Relationships   ? Social connections     Talks on phone: None     Gets together: None     Attends Cheondoism service: None     Active member of club or organization: None     Attends meetings of clubs or organizations: None     Relationship status: None   ? Intimate partner violence      Fear of current or ex partner: None     Emotionally abused: None     Physically abused: None     Forced sexual activity: None   Other Topics Concern   ? None   Social History Narrative   ? None          Review of Systems   Constitutional:        Could not review of systems secondary to patient is likely actively dying at this time and is nonresponsive.       Vitals:    12/31/20 1224   BP: (!) 85/55   Pulse: (!) 56   Resp: 16   Temp: 97.5  F (36.4  C)   SpO2: 90%       Physical Exam  Constitutional:       Comments: Lying in bed comfortably unresponsive.   Cardiovascular:      Rate and Rhythm: Bradycardia present.   Pulmonary:      Breath sounds: Wheezing and rales present.   Abdominal:      General: There is distension.   Neurological:      Comments: Responsive.         Medication List:  Current Outpatient Medications   Medication Sig   ? atropine 1 % ophthalmic solution 1 to 2 drops orally or sublingually every 4 hours as needed for secretions.   ? bisacodyL (DULCOLAX) 10 mg suppository Insert 1 suppository (10 mg total) into the rectum daily. As needed for bowel movement.   ? HYDROmorphone (DILAUDID) 1 mg/mL Liqd solution Take 0.5-2 mL (0.5-2 mg total) every 4 (four) hours by mouth as needed for pain or shortness of breath.  Oral or sublingual   ? LORazepam (ATIVAN) 2 mg/1 mL concentrated solution Take 0.25-1 mL (0.5-2 mg total) by mouth every 4 (four) hours as needed for pain or shortness of breath. Oral or sublingual   ? morphine 100 mg/5 mL (20 mg/mL) concentrated solution Take 0.25-0.5 mL (5-10 mg total) by mouth every 4 (four) hours as needed for pain or shortness of breath.       Labs: Reviewed.      Assessment:    ICD-10-CM    1. 2019 novel coronavirus disease (COVID-19)  U07.1    2. Chronic Kidney Disease, Stage 4  N18.4    3. Bipolar affective disorder, remission status unspecified (H)  F31.9    4. Type 2 diabetes mellitus with complication, without long-term current use of insulin (H)  E11.8    5.  Acute renal failure superimposed on chronic kidney disease, unspecified CKD stage, unspecified acute renal failure type (H)  N17.9     N18.9    6. Morbid obesity (H)  E66.01        Plan: Plan at this time we will continue with comfort cares.  I did look over the medications and the only one that was not on the med reconciliation was acetaminophen suppository.  I tried to put to put that in the med reconciliation but would not go but everything else is accurate.  I did discuss with nursing staff is anything else I can do at this time and I will follow along with hospice and no other changes to care plan at this time we will continue to monitor above medical problems and no other changes.        Electronically signed by: Sher Monaco, DO

## 2021-06-21 NOTE — LETTER
Letter by Kim Reynolds CNP at      Author: Kim Reynolds CNP Service: -- Author Type: --    Filed:  Encounter Date: 12/21/2020 Status: (Other)         December 21, 2020     Patient: Roseanna Mcmahan   YOB: 1931   Date of Visit: 12/21/2020       To Whom It May Concern:    Roseanna is currently under my medical care at [facility] and she no longer can manage her finances due to her multiple medical conditions including dementia and Bipolar disorder. She currently requires assistance with all of her cares and ADL's from the nursing facility staff.     If you have any questions or concerns, please don't hesitate to call.    Sincerely,        Electronically signed by Kim Reynolds CNP

## 2021-06-21 NOTE — LETTER
Letter by Kim Reynolds CNP at      Author: Kim Reynolds CNP Service: -- Author Type: --    Filed:  Encounter Date: 12/17/2020 Status: (Other)         Patient: Roseanna Mcmahan   MR Number: [id number]   YOB: 1931   Date of Visit: 12/17/2020     Code Status:  DNR/DNI  Visit Type: Problem Visit (Covid follow-up)     Facility:  [facility] [[id number]]         Facility Type:  (Long Term Care, LTC)    History of Present Illness: Roseanna Mcmahan is a 89 y.o. female who I am seeing today for recent diagnosis of Covid.  Past medical history includes diabetes type II, hypertension, bipolar depression, anemia of chronic disease, chronic kidney disease and LE edema. DJD of the knees.     Today pt sitting up in bedside chair. Patient recently tested positive on 12/15/2020 with massive testing throughout the facility. Today patient currently afebrile, no chills, no shortness of breath. Occ. dry cough. She does have underlying cognitive impairment. She is somewhat of a poor historian. She has poor appetite. She has underlying diabetes.  Blood sugar this a.m. with blood work was 49.  She was given orange juice and it came to 68.  She does continue on Lantus 15 units.  Today creatinine 2.03.  GFR 20.  BUN 35.  Hemoglobin 10.8.  She does have underlying chronic kidney disease with creatinine around 1.8 at baseline. History of bipolar disorder on Abilify and Wellbutrin.  History of gout on allopurinol.     Active Ambulatory Problems     Diagnosis Date Noted   ? Cerumen Impaction    ? (Lower) Leg Localized Swelling Bilateral    ? Bipolar Disorder    ? Cellulitis    ? Type 2 Diabetes Mellitus    ? Chronic Kidney Disease With Benign Hypertension    ? Osteoarthritis Of The Knee    ? Chronic Kidney Disease, Stage 4    ? Bipolar I disorder, most recent episode (or current) depressed, mild 08/11/2014   ? Adjustment disorder with mixed anxiety and depressed mood 08/11/2014   ? Bipolar I  disorder, most recent episode (or current) depressed, mild 10/13/2014   ? Bipolar I disorder, most recent episode (or current) depressed, in partial or unspecified remission 04/13/2015   ? Intertrigo 12/14/2015   ? Obesity (BMI 35.0-39.9) with comorbidity (H) 11/04/2020     Resolved Ambulatory Problems     Diagnosis Date Noted   ? No Resolved Ambulatory Problems     No Additional Past Medical History     Current Outpatient Medications   Medication Sig   ? allopurinol (ZYLOPRIM) 100 MG tablet Take 100 mg by mouth daily.    ? ARIPiprazole (ABILIFY) 10 MG tablet Take 1 tablet (10 mg total) by mouth daily.   ? buPROPion (WELLBUTRIN XL) 150 MG 24 hr tablet Take 1 tablet (150 mg total) by mouth every morning.   ? glucose 4 GM chewable tablet Chew 16 g daily as needed for low blood sugar.   ? insulin glargine (LANTUS SOLOSTAR PEN) 100 unit/mL (3 mL) pen Inject 18 Units under the skin at bedtime.   ? latanoprost (XALATAN) 0.005 % ophthalmic solution Administer 1 drop to both eyes at bedtime.   ? nystatin (MYCOSTATIN) powder Apply 1 application topically 3 (three) times a day as needed.    ? triamcinolone (KENALOG) 0.1 % lotion Apply 1 application topically 2 (two) times a day as needed. Apply small amount to upper arms and back as needed.           No Known Allergies    Review of Systems   Somewhat of poor historian.  No fever or chills. No headache, lightheadedness or dizziness.  Patient denies SOB, no cough, no chest pains or palpitations. No nausea, vomiting, constipation or diarrhea. Poor oral intake.  Patient denies any dysuria, frequency, burning or pain with urination. Otherwise review of systems are negative.         PHYSICAL EXAMINATION:  General: Patient sitting up in bedside chair.  Appears somewhat sleepy.  VS: /85, pulse 80, respirations 20, temperature 97.7, O2 sat 93% RA.  Weight 174.3 pounds.  HEENT: Moist oral mucosa.  Severe hard of hearing.  No aids.  She does wear dentures which are loose in her  mouth.  Facial symmetry.  Tongue is midline.  NECK: Supple  CARDIOVASCULAR: S1, S2 without murmur or gallop.   RESPIRATORY: No wheezing, rales or rhonchi.  No cough on exam.  ABDOMEN: Obese. Soft nondistended with positive bowel sounds x4.  EXTREMITIES: Movesboth upper and lower extremities with generalized weakness, trace pedal edema, no calf tenderness.    NEUROLOGIC: Intact  PSYCHIATRIC: Advanced cognitive impairment with decline.      Assessment and Plan:  1. 2019 novel coronavirus disease (COVID-19)   patient currently asymptomatic.  No fever chills shortness of breath or cough.  Will follow up with a CBC, BMP and CRP in the a.m.   2. Type 2 diabetes mellitus with complication, without long-term current use of insulin (H)   low blood sugar this a.m.  Decrease Lantus to 10 units.  Hold if less than 50% of meal consumed.  Increase blood sugar checks to 3 times daily.   3. Chronic Kidney Disease, Stage 4   baseline 1.8.  Now 2.03.  Give 1 L of normal saline at 75 cc/h.  Follow-up BMP on Monday.  Encourage food and fluids.   4. Bipolar affective disorder, remission status unspecified (H)   continues on Abilify and Wellbutrin.         Electronically signed by: Kim Reynolds, CNP

## 2021-06-21 NOTE — PROGRESS NOTES
Code Status:  DNR/DNI  Visit Type: Problem Visit     Facility:  [facility] [[id number]]         Facility Type:  (Long Term Care, LTC)    History of Present Illness: Roseanna Mcmahan is a 87 y.o. female who I am seeing today at the request of the nurses for her nonhealing wound on her right foot. Pt with hx of diabetes type II, hypertension, bipolar depression, anemia of chronic disease, chronic kidney disease and LE edema. DJD of the knees.  Patient with chronic nonhealing wound to the top of the right foot. She does have significant hammer toe there.  Approximately 1 month ago she cultured positive for staph aureus.  She did complete a 10-day course of Keflex. Diabetes satisfactory controlled.     Today right foot red with peeling skin. Scratch marks visible. Moderate serous drainage to old dressing. She complains of itching. She denies any pain. 1+ edema. She has received 2 doses of IM rocephin. She is a-febrile.     Active Ambulatory Problems     Diagnosis Date Noted     Cerumen Impaction      (Lower) Leg Localized Swelling Bilateral      Bipolar Disorder      Cellulitis      Type 2 Diabetes Mellitus      Chronic Kidney Disease With Benign Hypertension      Osteoarthritis Of The Knee      Chronic Kidney Disease, Stage 4      Bipolar I disorder, most recent episode (or current) depressed, mild 08/11/2014     Adjustment disorder with mixed anxiety and depressed mood 08/11/2014     Bipolar I disorder, most recent episode (or current) depressed, mild 10/13/2014     Bipolar I disorder, most recent episode (or current) depressed, in partial or unspecified remission 04/13/2015     Intertrigo 12/14/2015     Resolved Ambulatory Problems     Diagnosis Date Noted     No Resolved Ambulatory Problems     No Additional Past Medical History     Meds reviewed at facility.       No Known Allergies    Review of Systems   Refer to HPI.     PHYSICAL EXAMINATION:  General: Awake, Alert, sitting up in recliner.   VS:  /92, P 75, R 20, T 96.9  HEENT:moist oral mucosa.  Severe hard of hearing.  No aids.  She does wear dentures.  EXTREMITIES: Good range of motion on both upper and lower extremities, 1+pedal edema, no calf tenderness.   SKIN: Right foot with red, peeling skin. Moderate serous drainage to dressing. Scratch marks visible. Nails wet and fungal in appearance. All nails trimmed.   NEUROLOGIC: Intact, pulses palpable  PSYCHIATRIC: Cognition intact.Pleasant affect.      Assessment and Plan:  1. Diabetic foot infection (H)     2. Tinea pedis     3. Diabetes mellitus (H)       Chronic non healing ulcer to top of right foot. She was previously treated for staph infection in her foot about 1 month ago. She continues with calmoseptine topically mixed with antifungal. She is a-febrile. She complains of itching. Blood sugars occasionally elevated in the low 200s. She has received 2 doses of IM Rocephin. I am awaiting cultures. Awaiting dermatology follow up. Nails trimmed. Soft and fungal like. She does have hx of gout. Will check uric acid level.       Electronically signed by: Kim Reynolds, CNP

## 2021-06-21 NOTE — LETTER
Letter by Kim Reynolds CNP at      Author: Kim Reynolds CNP Service: -- Author Type: --    Filed:  Encounter Date: 12/16/2020 Status: (Other)         Patient: Roseanna Mcmahan   MR Number: [id number]   YOB: 1931   Date of Visit: 12/16/2020     Code Status:  DNR/DNI  Visit Type: Problem Visit (Covid)     Facility:  [facility] [[id number]]         Facility Type:  (Long Term Care, LTC)    History of Present Illness: Roseanna Mcmahan is a 89 y.o. female who I am seeing today for recent diagnosis of Covid.  Past medical history includes diabetes type II, hypertension, bipolar depression, anemia of chronic disease, chronic kidney disease and LE edema. DJD of the knees.       Today pt sitting up in bedside chair.  Patient recently tested positive on 12/15/2020 with massive testing throughout the facility.  Today patient currently afebrile, no chills, no shortness of breath or cough.  She does have underlying cognitive impairment however she attempts to answer questions appropriately.  No hypoxia on exam.  Underlying diabetes type 2.  She continues on Lantus.  History of bipolar disorder on Abilify and Wellbutrin.  History of gout on allopurinol.  Chronic kidney disease with creatinine around 1.8 at baseline.  I did leave a message for her son Hong regarding recent diagnosis and overall prognosis.  Currently patient stable.      Active Ambulatory Problems     Diagnosis Date Noted   ? Cerumen Impaction    ? (Lower) Leg Localized Swelling Bilateral    ? Bipolar Disorder    ? Cellulitis    ? Type 2 Diabetes Mellitus    ? Chronic Kidney Disease With Benign Hypertension    ? Osteoarthritis Of The Knee    ? Chronic Kidney Disease, Stage 4    ? Bipolar I disorder, most recent episode (or current) depressed, mild 08/11/2014   ? Adjustment disorder with mixed anxiety and depressed mood 08/11/2014   ? Bipolar I disorder, most recent episode (or current) depressed, mild 10/13/2014   ?  Bipolar I disorder, most recent episode (or current) depressed, in partial or unspecified remission 04/13/2015   ? Intertrigo 12/14/2015   ? Obesity (BMI 35.0-39.9) with comorbidity (H) 11/04/2020     Resolved Ambulatory Problems     Diagnosis Date Noted   ? No Resolved Ambulatory Problems     No Additional Past Medical History     Current Outpatient Medications   Medication Sig   ? allopurinol (ZYLOPRIM) 100 MG tablet Take 100 mg by mouth daily.    ? ARIPiprazole (ABILIFY) 10 MG tablet Take 1 tablet (10 mg total) by mouth daily.   ? buPROPion (WELLBUTRIN XL) 150 MG 24 hr tablet Take 1 tablet (150 mg total) by mouth every morning.   ? glucose 4 GM chewable tablet Chew 16 g daily as needed for low blood sugar.   ? insulin glargine (LANTUS SOLOSTAR PEN) 100 unit/mL (3 mL) pen Inject 18 Units under the skin at bedtime.   ? latanoprost (XALATAN) 0.005 % ophthalmic solution Administer 1 drop to both eyes at bedtime.   ? nystatin (MYCOSTATIN) powder Apply 1 application topically 3 (three) times a day as needed.    ? triamcinolone (KENALOG) 0.1 % lotion Apply 1 application topically 2 (two) times a day as needed. Apply small amount to upper arms and back as needed.           No Known Allergies    Review of Systems   Somewhat of poor historian.  No fever or chills. No headache, lightheadedness or dizziness.  Patient denies SOB, no cough, no chest pains or palpitations. No nausea, vomiting, constipation or diarrhea.  Patient denies any dysuria, frequency, burning or pain with urination. Otherwise review of systems are negative.         PHYSICAL EXAMINATION:  General: Patient sitting up in bedside chair.   VS: /80, pulse 91, respirations 16, temperature 97.9, O2 sat 96% RA.  Weight 174.3 pounds.  HEENT:moist oral mucosa.  Severe hard of hearing.  No aids.  She does wear dentures which are loose in her mouth.  Facial symmetry.  Tongue is midline.  NECK: Supple  CARDIOVASCULAR: S1, S2 without murmur or gallop.    RESPIRATORY: No wheezing, rales or rhonchi.  No cough on exam.  ABDOMEN: Obese.  Soft nondistended with positive bowel sounds x4.  EXTREMITIES: Movesboth upper and lower extremities with generalized weakness, trace pedal edema, no calf tenderness.    NEUROLOGIC: Intact  PSYCHIATRIC: Advanced cognitive impairment with decline.      Assessment and Plan:  1. 2019 novel coronavirus disease (COVID-19)   patient currently asymptomatic.  No fever chills shortness of breath or cough.  Will follow up with a CBC, BMP and CRP in the a.m.   2. Type 2 diabetes mellitus with complication, without long-term current use of insulin (H)   continue on Lantus.  Continue to monitor blood sugars.   3. Chronic Kidney Disease, Stage 4   baseline 1.8.  Will need to monitor for dehydration.  Encourage food and fluids.   4. Bipolar affective disorder, remission status unspecified (H)   continues on Abilify and Wellbutrin.         Electronically signed by: Kim Reynolds, CNP

## 2021-06-21 NOTE — LETTER
"Letter by Kim Reynolds CNP at      Author: Kim Reynolds CNP Service: -- Author Type: --    Filed:  Encounter Date: 12/22/2020 Status: (Other)         Patient: Roseanna Mcmahan   MR Number: [id number]   YOB: 1931   Date of Visit: 12/22/2020     Code Status:  DNR/DNI  Visit Type: Problem Visit (Covid follow-up)     Facility:  [facility] [[id number]]         Facility Type:  (Long Term Care, LTC)    History of Present Illness: Roseanna Mcmahan is a 89 y.o. female who I am seeing today for follow up of recent diagnosis of Covid.  Past medical history includes diabetes type II, hypertension, bipolar depression, anemia of chronic disease, chronic kidney disease and LE edema. DJD of the knees.     Today pt lying in bed. She does arouse and is talking today. Patient recently tested positive on 12/15/2020 with massive testing throughout the facilit. She appears very fatigued. She developed acute on chronic kidney failure. Creatine 2.19. She was given a liter of fluids last evening. She has continued with poor oral intake. She denies any shortness of breath. However per the notes she had an episode last evening where her sats were 89% on RA. CXR yesterday was negative. No cough on exam. She did have low grade temperature this am. She has hx of UTI. UA obtained this am. She was given a loading dose of Rocephin this am. Later during I visited her again and her son and daughter was present. I did review current plan of care and overall prognosis. At this time family does not want her sent to hospital. Reviewed goals of care and family \"feels it would be very traumatic to move her and she may decline rapidly if taken out her normal environment.\" No further loose stools today. No nausea. She continues on scheduled Zofran two times a day. Her son was able to feed her about 35% of her brunch today. She was able to drink about 480cc. Her blood sugars continue to fluctuate. She " continues on sliding scale. Her Lantus is to be held if < than 50% of meal consumed.       Active Ambulatory Problems     Diagnosis Date Noted   ? Cerumen Impaction    ? (Lower) Leg Localized Swelling Bilateral    ? Bipolar Disorder    ? Cellulitis    ? Type 2 Diabetes Mellitus    ? Chronic Kidney Disease With Benign Hypertension    ? Osteoarthritis Of The Knee    ? Chronic Kidney Disease, Stage 4    ? Bipolar I disorder, most recent episode (or current) depressed, mild 08/11/2014   ? Adjustment disorder with mixed anxiety and depressed mood 08/11/2014   ? Bipolar I disorder, most recent episode (or current) depressed, mild 10/13/2014   ? Bipolar I disorder, most recent episode (or current) depressed, in partial or unspecified remission 04/13/2015   ? Intertrigo 12/14/2015   ? Obesity (BMI 35.0-39.9) with comorbidity (H) 11/04/2020     Resolved Ambulatory Problems     Diagnosis Date Noted   ? No Resolved Ambulatory Problems     No Additional Past Medical History     Current Outpatient Medications   Medication Sig   ? allopurinol (ZYLOPRIM) 100 MG tablet Take 100 mg by mouth daily.    ? ARIPiprazole (ABILIFY) 10 MG tablet Take 1 tablet (10 mg total) by mouth daily.   ? buPROPion (WELLBUTRIN XL) 150 MG 24 hr tablet Take 1 tablet (150 mg total) by mouth every morning.   ? glucose 4 GM chewable tablet Chew 16 g daily as needed for low blood sugar.   ? insulin glargine (LANTUS SOLOSTAR PEN) 100 unit/mL (3 mL) pen Inject 18 Units under the skin at bedtime.   ? latanoprost (XALATAN) 0.005 % ophthalmic solution Administer 1 drop to both eyes at bedtime.   ? nystatin (MYCOSTATIN) powder Apply 1 application topically 3 (three) times a day as needed.    ? triamcinolone (KENALOG) 0.1 % lotion Apply 1 application topically 2 (two) times a day as needed. Apply small amount to upper arms and back as needed.           No Known Allergies    Review of Systems   Somewhat of poor historian.  No fever or chills. No headache,  lightheadedness or dizziness.  Patient denies any SOB, no cough, no chest pains or palpitations. Nausea improved, no vomiting, constipation, no diarrhea. Poor oral intake.  Patient denies any dysuria, frequency, burning or pain with urination. Otherwise review of systems are negative.         PHYSICAL EXAMINATION:  General: Patient lying in bed.  Lethargic but arouses. She is talking today.   VS: /84, pulse 107, respirations 16, temperature 98.4, O2 sat 90% RA.  Weight 174.3 pounds.    HEENT:  Dry oral mucosa. Severe hard of hearing.  No aids.  She does wear dentures but they have been removed.    NECK: Supple  CARDIOVASCULAR: S1, S2 without murmur or gallop.   RESPIRATORY: No wheezing, rales or rhonchi. Shallow respiratory effort.  No cough on exam.  ABDOMEN: Obese. Soft nondistended with positive bowel sounds x4.  EXTREMITIES: Movesboth upper and lower extremities with diffuse weakness.   NEUROLOGIC: Intact  PSYCHIATRIC: Advanced cognitive impairment with decline.      Assessment and Plan:  1. 2019 novel coronavirus disease (COVID-19)  CXR yesterday was negative. Continue to monitor for hypoxia. Ok for O2 at 1-3 liters to keep sats > 90%.   Low-grade temp of 99.   2. Type 2 diabetes mellitus with complication, without long-term current use of insulin (H)  Continue Lantus to 10 units.  Hold if less than 50% of meal consumed.  Increase blood sugar checks to 3 times daily.   3. Chronic Kidney Disease, Stage 4   baseline 1.8. Today Creatine 2.30. Given additional liter of fluids. Repeat BMP in am.   Obtain UA UC.  Give 1 g of IM Rocephin with lidocaine x1 now.   4.  Nausea  Zofran 8 mg two times a day.    5.  Loose stools  Give imodium 2 mg four times a day prn .   6. Bipolar affective disorder, remission status unspecified (H)   continues on Abilify and Wellbutrin.     Total time spent for this visit was 35 minutes with greater than 50% of the time spent face-to-face with patient, son and daughter reviewing  goals of care.  At this time family does not wish to hospitalize.      Electronically signed by: Kim Reynolds CNP

## 2021-06-21 NOTE — LETTER
Letter by Sher Monaco DO at      Author: Sher Monaco DO Service: -- Author Type: --    Filed:  Encounter Date: 11/4/2020 Status: (Other)         Patient: Roseanna Mcmahan   MR Number: [id number]   YOB: 1931   Date of Visit: 11/4/2020     [facility]    Facility:   [facility] [[id number]]   Code Status: DNR/DNI      CHIEF COMPLAINT/REASON FOR VISIT:  Chief Complaint   Patient presents with   ? Review Of Multiple Medical Conditions     2 diabetes, bipolar depression, anemia of chronic disease, bilateral lower extremity edema, history of cellulitis, chronic kidney disease stage IV has not changed too much by my review of the last labs,.       HISTORY:      HPI: Roseanna is a 89 y.o. female term care for multiple medical problems.  She is a type 2 diabetes hypertension bipolar depression.  She has been in remission from her bipolar depression is been no real signs of any kinds of cycling at this time.  Her blood pressure is under better control at this time at 117/74 and your blood sugars have been okay although they are not checking them too much at this time.  I do not have an A1c of past but I think I would recommend that on the next lab day as well as a basic metabolic profile.    Staff have no new concerns and patient is lying in bed she says she feels fine with no new concerns.    Past Medical History:   Diagnosis Date   ? (Lower) Leg Localized Swelling Bilateral     Created by Conversion    ? Adjustment disorder with mixed anxiety and depressed mood 8/11/2014   ? Bipolar Disorder     Created by Conversion  Replacement Utility updated for latest IMO load   ? Bipolar I disorder, most recent episode (or current) depressed, in partial or unspecified remission 4/13/2015   ? Bipolar I disorder, most recent episode (or current) depressed, mild 8/11/2014     Replacement Utility updated for latest IMO load   ? Cellulitis     Created by Conversion    ? Leatha  Impaction     Created by Conversion    ? Chronic Kidney Disease With Benign Hypertension     Created by Conversion    ? Chronic Kidney Disease, Stage 4     Created by Conversion    ? Intertrigo 2015   ? Osteoarthritis Of The Knee     Created by Conversion  Replacement Utility updated for latest IMO load   ? Type 2 Diabetes Mellitus     Created by Conversion              Family History   Problem Relation Age of Onset   ? Diabetes Sister    ? Cancer Sister    ? Diabetes Brother    ? Cancer Brother    ? Diabetes Son    ? Diabetes Sister    ? Cancer Son         colon cancer     Social History     Socioeconomic History   ? Marital status:      Spouse name: None   ? Number of children: None   ? Years of education: None   ? Highest education level: None   Occupational History   ? None   Social Needs   ? Financial resource strain: None   ? Food insecurity     Worry: None     Inability: None   ? Transportation needs     Medical: None     Non-medical: None   Tobacco Use   ? Smoking status: Former Smoker     Types: Cigarettes     Start date: 1949     Quit date: 1990     Years since quittin.4   ? Smokeless tobacco: Never Used   Substance and Sexual Activity   ? Alcohol use: No     Frequency: Never     Comment: rare   ? Drug use: No   ? Sexual activity: Never   Lifestyle   ? Physical activity     Days per week: None     Minutes per session: None   ? Stress: None   Relationships   ? Social connections     Talks on phone: None     Gets together: None     Attends Yarsanism service: None     Active member of club or organization: None     Attends meetings of clubs or organizations: None     Relationship status: None   ? Intimate partner violence     Fear of current or ex partner: None     Emotionally abused: None     Physically abused: None     Forced sexual activity: None   Other Topics Concern   ? None   Social History Narrative   ? None         Review of Systems   Constitutional:        Patient denies  any pain fevers chills nausea vomit diarrhea change in vision hearing taste or smell weakness one-sided of the chest pain shortness of breath.  She denies any current shortness stool polyphagia polydipsia polyuria depression or anxiety and she was swabbed for Covid today on routine testing.       Vitals:    11/04/20 1030   BP: 117/74   Pulse: 99   Resp: 14   Temp: 98.2  F (36.8  C)   SpO2: 97%       Physical Exam  Constitutional:       General: She is not in acute distress.  HENT:      Head: Normocephalic and atraumatic.      Nose: Nose normal.      Mouth/Throat:      Mouth: Mucous membranes are moist.      Pharynx: Oropharynx is clear.   Cardiovascular:      Rate and Rhythm: Normal rate and regular rhythm.   Pulmonary:      Effort: No respiratory distress.      Breath sounds: Normal breath sounds.   Neurological:      Mental Status: She is alert.           LABS: Laboratory values are as follows 6/16/2020 sodium was 142, potassium 4.0, CO2 is 29, calcium is 8.6, BUN was 18, creatinine 1.87, GFR was 25 which is I look back in the chart this has not changed.  White count was 9.1, hemoglobin was 11.2, platelets 199,000.  Blood sugars running .      ASSESSMENT:      ICD-10-CM    1. Type 2 diabetes mellitus with complication, without long-term current use of insulin (H)  E11.8    2. Morbid obesity (H)  E66.01    3. Confusion  R41.0    4. Acute renal failure superimposed on chronic kidney disease, unspecified CKD stage, unspecified acute renal failure type (H)  N17.9     N18.9    5. Chronic Kidney Disease, Stage 4  N18.4    6. Bilateral edema of lower extremity  R60.0    7. Bipolar affective disorder, remission status unspecified (H)  F31.9        PLAN: Plan at this time we will continue to monitor above medical problems no new changes.  She will likely need another basic metabolic profile on next visit to be ordered secondary to her creatinine 1.87 with a GFR of 25.  This has not changed too much from previous and  she does have chronic kidney disease.  She is chronic kidney disease stage IV be consistent.  We will continue to check her blood sugars at this time and no other changes to care plan at this time.      Total  minutes of which % was spent counseling and coordination of care of the above plan.    Electronically signed by: Sher Monaco DO

## 2021-06-22 NOTE — PROGRESS NOTES
Code Status:  DNR/DNI  Visit Type: Problem Visit     Facility:  [facility] [[id number]]         Facility Type:  (Long Term Care, LTC)    History of Present Illness: Roseanna Mcmahan is a 87 y.o. female who I am seeing today at the request of the nurses development of rash again to right foot.  Pt with hx of diabetes type II, hypertension, bipolar depression, anemia of chronic disease, chronic kidney disease and LE edema. DJD of the knees. Pt right foot infection and non healing rash had healed. She was seen at the vascular center. She is currently only receiving moisturizing cream. She has mild development of rash to forefoot. No pain. No swelling. No warmth. She tells me she recently had GI illness including nausea, vomiting and diarrhea. Symptoms now resolved.          Active Ambulatory Problems     Diagnosis Date Noted     Cerumen Impaction      (Lower) Leg Localized Swelling Bilateral      Bipolar Disorder      Cellulitis      Type 2 Diabetes Mellitus      Chronic Kidney Disease With Benign Hypertension      Osteoarthritis Of The Knee      Chronic Kidney Disease, Stage 4      Bipolar I disorder, most recent episode (or current) depressed, mild 08/11/2014     Adjustment disorder with mixed anxiety and depressed mood 08/11/2014     Bipolar I disorder, most recent episode (or current) depressed, mild 10/13/2014     Bipolar I disorder, most recent episode (or current) depressed, in partial or unspecified remission 04/13/2015     Intertrigo 12/14/2015     Resolved Ambulatory Problems     Diagnosis Date Noted     No Resolved Ambulatory Problems     Past Medical History:   Diagnosis Date     (Lower) Leg Localized Swelling Bilateral      Adjustment disorder with mixed anxiety and depressed mood 8/11/2014     Bipolar Disorder      Bipolar I disorder, most recent episode (or current) depressed, in partial or unspecified remission 4/13/2015     Bipolar I disorder, most recent episode (or current)  depressed, mild 8/11/2014     Cellulitis      Cerumen Impaction      Chronic Kidney Disease With Benign Hypertension      Chronic Kidney Disease, Stage 4      Intertrigo 12/14/2015     Osteoarthritis Of The Knee      Type 2 Diabetes Mellitus      Meds reviewed at facility.       No Known Allergies    Review of Systems   No fevers or chills. No headache, lightheadedness or dizziness. No SOB, chest pains or palpitations. Pt reports recent GI illness including nausea, vomiting and diarrhea. Currently no nausea, vomiting, constipation or diarrhea. No dysuria, frequency, burning or pain with urination.She denies any pain in foot.  Otherwise review of systems are negative.         PHYSICAL EXAMINATION:  General: Awake, Alert, sitting up in recliner.   VS: /77, heart rate 86, respirations 16, temperature 96, O2 sat 93% there..  Weight 189.6 pounds.  HEENT:moist oral mucosa.  Severe hard of hearing.  No aids.  She does wear dentures.  CARDIOVASCULAR: S1-S2 without murmur gallop.  RESPIRATORY: Wheezes rales or rhonchi.  ABDOMEN: Obese.  Nontender with positive bowel sounds.  EXTREMITIES: Good range of motion on both upper and lower extremities, trace pedal edema, no calf tenderness.   SKIN: Right foot with beginnings or rash to fore foot. No warmth. No edema. Foot dry. She does have scalying.   NEUROLOGIC: Intact, pulses palpable  PSYCHIATRIC: Cognition intact.Pleasant affect.      Assessment and Plan:  1. Diabetic foot infection (H)     2. Gastrointestinal symptoms         Previous diabetic foot nfection with tinea pedis that had resoved.  She was seen by the vascular clinic.  She underwent a clinical debridement as well and Hibiclens and TMC cream topically. She ha been receiving moisturizing cream. Today with slight rash again on forefoot. I will have staff reinstate previous orders received from vascular center. Recent GI illness. Symptoms resolved. She told me she is drinking and eat good now.          Electronically signed by: Kim Reynolds, CNP

## 2021-06-22 NOTE — PROGRESS NOTES
Code Status:  DNR/DNI  Visit Type: Problem Visit     Facility:  [facility] [[id number]]         Facility Type:  (Long Term Care, LTC)    History of Present Illness: Roseanna Mcmahan is a 87 y.o. female who I am seeing today at the request of the nurses for her nonhealing wound on her right foot. Pt with hx of diabetes type II, hypertension, bipolar depression, anemia of chronic disease, chronic kidney disease and LE edema. DJD of the knees.  Patient with chronic nonhealing wound to the top of the right foot. She does have significant hammer toe there.  Approximately 1 month ago she cultured positive for staph aureus.  She did complete a 10-day course of Keflex. Diabetes satisfactory controlled.     Patient sitting up in bedside chair.She continues with redness, peeling and drainage to right foot despite mx topical treatments and oral antibiotics and antifungals. DM satisfactory controlled. She reports no burning but does complain of itching.     Active Ambulatory Problems     Diagnosis Date Noted     Cerumen Impaction      (Lower) Leg Localized Swelling Bilateral      Bipolar Disorder      Cellulitis      Type 2 Diabetes Mellitus      Chronic Kidney Disease With Benign Hypertension      Osteoarthritis Of The Knee      Chronic Kidney Disease, Stage 4      Bipolar I disorder, most recent episode (or current) depressed, mild 08/11/2014     Adjustment disorder with mixed anxiety and depressed mood 08/11/2014     Bipolar I disorder, most recent episode (or current) depressed, mild 10/13/2014     Bipolar I disorder, most recent episode (or current) depressed, in partial or unspecified remission 04/13/2015     Intertrigo 12/14/2015     Resolved Ambulatory Problems     Diagnosis Date Noted     No Resolved Ambulatory Problems     Past Medical History:   Diagnosis Date     (Lower) Leg Localized Swelling Bilateral      Adjustment disorder with mixed anxiety and depressed mood 8/11/2014     Bipolar Disorder       Bipolar I disorder, most recent episode (or current) depressed, in partial or unspecified remission 4/13/2015     Bipolar I disorder, most recent episode (or current) depressed, mild 8/11/2014     Cellulitis      Cerumen Impaction      Chronic Kidney Disease With Benign Hypertension      Chronic Kidney Disease, Stage 4      Intertrigo 12/14/2015     Osteoarthritis Of The Knee      Type 2 Diabetes Mellitus      Meds reviewed at facility.       No Known Allergies    Review of Systems   Refer to HPI.     PHYSICAL EXAMINATION:  General: Awake, Alert, sitting up in recliner.   VS: /77, heart rate 86, respirations 16, temperature 97.3.  Weight 181 pounds.  HEENT:moist oral mucosa.  Severe hard of hearing.  No aids.  She does wear dentures.  CARDIOVASCULAR: S1-S2 without murmur gallop.  RESPIRATORY: Wheezes rales or rhonchi.  ABDOMEN: Obese.  Nontender with positive bowel sounds.  EXTREMITIES: Good range of motion on both upper and lower extremities, trace pedal edema, no calf tenderness.   SKIN: Right foot without redness or edema.  Only a small amount of peeling dry skin noted.  No moisture maceration between the toes.  NEUROLOGIC: Intact, pulses palpable  PSYCHIATRIC: Cognition intact.Pleasant affect.      Assessment and Plan:  1. Diabetic foot infection (H)     2. Tinea pedis of left foot     3. Acute cystitis without hematuria       Patient recently treated for UTI.  Urine culture positive for greater than 100,000 E. coli.  She continues on oral antibiotics.  She is afebrile.  She denies any dysuria frequency burning or pain.  Previous diabetic footinfection with tinea pedis.  She was seen by the vascular clinic.  She underwent a clinical debridement as well and Hibiclens and TMC cream topically.  Area has greatly improved.  No redness or swelling.  She is not wearing her Tubigrip's.  However no lower extremity edema.  She has a follow-up with vascular the end of the week.    Electronically signed by: Kim  Carey Reynolds, CNP

## 2021-06-22 NOTE — PROGRESS NOTES
[facility]    [id number]   Code Status: DNR/DNI      CHIEF COMPLAINT/REASON FOR VISIT:  Chief Complaint   Patient presents with     Review Of Multiple Medical Conditions     Nonhealing wound on her right foot, history of diabetes in adequate control, hypertension, bipolar depression, anemia of chronic disease, chronic kidney disease, lower extremity edema, degenerative joint disease.       HISTORY:      HPI: Roseanna is a 87 y.o. female who resides here at the [facility] she is got multiple medical problems including bipolar depression as well as anemia of chronic disease and chronic kidney disease as well as lower extremity edema.  She does have a nonhealing ulcer on her foot is being treated by our nurse wound specialist at this time and they are doing appropriate job at that at this time.  Blood sugars have been adequate at 78 up to 161 and the highest and staff has no new concerns.  Patient feels fine at this time and denies any at the other issues.  Her blood sugars has been okay and her foot ulcer has healed over without any open areas.  There is no redness or cellulitis.  She is urinating okay and denies any other issues at this time.    Past Medical History:   Diagnosis Date     (Lower) Leg Localized Swelling Bilateral     Created by Conversion      Adjustment disorder with mixed anxiety and depressed mood 8/11/2014     Bipolar Disorder     Created by Conversion  Replacement Utility updated for latest IMO load     Bipolar I disorder, most recent episode (or current) depressed, in partial or unspecified remission 4/13/2015     Bipolar I disorder, most recent episode (or current) depressed, mild 8/11/2014     Replacement Utility updated for latest IMO load     Cellulitis     Created by Conversion      Cerumen Impaction     Created by Conversion      Chronic Kidney Disease With Benign Hypertension     Created by Conversion       Chronic Kidney Disease, Stage 4     Created by Conversion      Intertrigo 2015     Osteoarthritis Of The Knee     Created by Conversion  Replacement Utility updated for latest IMO load     Type 2 Diabetes Mellitus     Created by Conversion              Family History   Problem Relation Age of Onset     Diabetes Sister      Cancer Sister      Diabetes Brother      Cancer Brother      Diabetes Son      Diabetes Sister      Cancer Son         colon cancer     Social History     Socioeconomic History     Marital status:      Spouse name: None     Number of children: None     Years of education: None     Highest education level: None   Social Needs     Financial resource strain: None     Food insecurity - worry: None     Food insecurity - inability: None     Transportation needs - medical: None     Transportation needs - non-medical: None   Occupational History     None   Tobacco Use     Smoking status: Former Smoker     Types: Cigarettes     Start date: 1949     Last attempt to quit: 1990     Years since quittin.6     Smokeless tobacco: Never Used   Substance and Sexual Activity     Alcohol use: Yes     Comment: rare     Drug use: No     Sexual activity: None   Other Topics Concern     None   Social History Narrative     None         Review of Systems   Constitutional:        Patient denies any pain fevers chills nausea vomiting diarrhea change in vision hearing taste or smell weakness one-sided chest pain shortness of breath she denies any incontinent of stool polyphagia polydipsia polyuria depression anxiety and the remainder the review of systems is negative.       .  Vitals:    18 1320   BP: 129/77   Pulse: 86   Resp: 16   Temp: 96.6  F (35.9  C)   SpO2: 93%       Physical Exam   Constitutional: She appears well-nourished. No distress.   HENT:   Head: Normocephalic and atraumatic.   Nose: Nose normal.   Eyes: Conjunctivae are normal. Right eye exhibits no discharge. Left eye  exhibits no discharge.   Neck: Neck supple. No thyromegaly present.   Cardiovascular: Normal rate and regular rhythm.   No murmur heard.  Pulmonary/Chest: Effort normal and breath sounds normal. No respiratory distress. She has no wheezes.   Abdominal: Soft. Bowel sounds are normal. She exhibits distension. There is no tenderness. There is no rebound.   Musculoskeletal:   Trace edema bilateral was noted however she does have a  closed wound on her right foot.  Left foot is pretty much normal but she does have hammertoes.  She can get her shoes on at this time.   Neurological: She is alert. No cranial nerve deficit. She exhibits normal muscle tone.   Skin: Skin is warm and dry. She is not diaphoretic.   Psychiatric: She has a normal mood and affect. Her behavior is normal.         LABS: 3 blood sugars are from .      ASSESSMENT:      ICD-10-CM    1. Chronic Kidney Disease, Stage 4 N18.4    2. Bilateral edema of lower extremity R60.0    3. Bipolar affective disorder, remission status unspecified (H) F31.9    4. Type 2 diabetes mellitus with complication, without long-term current use of insulin (H) E11.8        PLAN: Plan at this time is continue to monitor above medical problems no other changes.  She would likely need next month a basic metabolic profile secondary to her diabetes and an A1c although to likely be within normal range.  I will continue to monitor above medical problems and no other changes to care plan at this time.  Her wound has healed at this time and her lower extremity edema has improved.      Total  minutes of which % was spent counseling and coordination of care of the above plan.    Electronically signed by: Sher Monaco DO

## 2021-06-24 NOTE — PROGRESS NOTES
Reason for visit - follow-up.  Son here with her today.  He states that she had a UTI in February, gone now, but she was going in closets and talking to things in the closet.  This has all resolved, but they also have found pills on pillows in pockets, etc, so now they are crushing all of her medication and giving it to her in pudding.  She does not know they are doing this, per son.  Sleep - none.  Depression -  none.  Anxiety -  none.  Panic attacks -  none.  SI/HI - denies.  Therapist - no  Side effects from medication - none noted.    No medication to report on the [place] .

## 2021-06-24 NOTE — PATIENT INSTRUCTIONS - HE
1.Patient will take the medications as prescribed.   Psychiatric medications:  Abilify 10 mg every morning for mood stabilization.   Wellbutrin  mg every morning.  Patient will not stop taking medications or adjust them without consulting with the provider.  2.Patient will call with any problems between 2 visits.  3.Patient will go to the emergency room if not feeling safe , unable to function in the community, or if suicidal, homicidal or hearing voices or having paranoia.  4.Patient will abstain from drugs and alcohol.  Patient says she does not use any drugs or alcohol.  5.Patient will not drive if sedated on medications or under influence of any substance.  Patient says she does not drive.  6.Patient will not mix psychiatric medications with drugs and alcohol.   7.Patient will watch his diet and exercise.  8.Patient will see non psychiatric providers for non psychiatric disorders.  9. Next appointment in 6 months.

## 2021-06-24 NOTE — PROGRESS NOTES
Outpatient Psychiatric  Progress Note    Date of visit: 3/11/2019         Discussion of Care and Treatment Recommendations:     This is a 87 y.o.  white female with bipolar disorder  She comes for this appointment accompanied by her daughter-in-law.  Patient , her daughter-in-law and I reviewed diagnosis and treatment plan and patient agrees with following recommendations:    1.Patient will take the medications as prescribed.   Psychiatric medications:  Abilify 10 mg every morning for mood stabilization.   Wellbutrin  mg every morning.  Patient will not stop taking medications or adjust them without consulting with the provider.  2.Patient will call with any problems between 2 visits.  3.Patient will go to the emergency room if not feeling safe , unable to function in the community, or if suicidal, homicidal or hearing voices or having paranoia.  4.Patient will abstain from drugs and alcohol.  Patient says she does not use any drugs or alcohol.  5.Patient will not drive if sedated on medications or under influence of any substance.  Patient says she does not drive.  6.Patient will not mix psychiatric medications with drugs and alcohol.   7.Patient will watch his diet and exercise.  8.Patient will see non psychiatric providers for non psychiatric disorders.  9. Next appointment in 6 months.         DIagnoses:       Bipolar disorder manic phase mild      Diabetes mellitus, chronic knee pain, newly discovered kidney insufficiency    Patient Active Problem List   Diagnosis     Cerumen Impaction     (Lower) Leg Localized Swelling Bilateral     Bipolar Disorder     Cellulitis     Type 2 Diabetes Mellitus     Chronic Kidney Disease With Benign Hypertension     Osteoarthritis Of The Knee     Chronic Kidney Disease, Stage 4     Bipolar I disorder, most recent episode (or current) depressed, mild     Adjustment disorder with mixed anxiety and depressed mood     Bipolar I disorder, most recent episode (or current)  depressed, mild     Bipolar I disorder, most recent episode (or current) depressed, in partial or unspecified remission     Intertrigo             Chief Complaint / Subjective:      Chief complaint: Improving after UTI and delirium 3 weeks ago.    History of Present Illness:Roseanna is here with her son. He says she was delirious 3 weeks ago from UTI. She was not swallowing her pills.He thinks they were falling out because her dentures are not good. They are loose because she does not ware polygrip glue. She gets them mixed in food now.     She participates in activities, but not as much. She has knee pain and that makes her irritable and depressed. She uses walker, but her son says she goes to wheelchair. She has decreased hearing. She has hearing aids, but she still does not hear well. Sleep is normal. She says 8 hours. She says she eats well and her son confirms it. She can focus on bingo and some TV shows. Energy is reasonable. She can go through daily activities with help of others. She says mood is controlled as long as she takes medications.She is not suicidal or homicidal, no delusions or hallucinations. She is alert and oriented  X 3. She and her son say that she remembers things. He son says she had broken skin from impaired circulation and it made her anxious, but she coped with it.      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    She finished 10th grade.    She is .    Her  is in a nursing home.    She had 4 children.  3 are living now.  One  in .    She lives in a nursing home.    Her children are supportive of her.    Mental Status Examination today:   General: fair  hygiene, cooperative   Orientatin: ×3  Speech: loud  because she has decreased hearing  Language: intact   Thought process: [place]  Thought content: devoid of delusions and hallucinations   Suicidal thoughts: absent   Homicidal thoughts: absent   Associations: connected   Affect: brighter  Mood: improved mood lability  Intellectual functioning: within normal limits   Memory: within normal limits   Fund of knowledge: sufficient   Attention and concentration: Normal  Gait: Using a walker  and wheelchair   Psychomotor activity: excited  Muscles: No atrophy, no abnormal movements   InSight and judgment: Fair     Medication adherence: compliant  Medication side effects: absent  Information about medications: Side effects, benefits and alternative treatments discussed and patient and her daughter-in-law and they agree.    Psychotherapy: Supportive therapy regarding above issues    Education: Diet, exercise, abstinence from drugs and alcohol, patient will not drive if sedated and medications or  under influence of any substance    Lab Results: Personally reviewed  Lab Results   Component Value Date    WBC 7.9 02/19/2019    HGB 10.6 (L) 02/19/2019    HCT 34.6 (L) 02/19/2019     02/19/2019    CHOL 130 09/17/2015    TRIG 123 09/17/2015    HDL 49 09/17/2015    ALT 7 09/17/2015    AST 12 09/17/2015     02/18/2019    K 3.9 02/18/2019     02/18/2019    CREATININE 1.99 (H) 02/18/2019    BUN 33 (H) 02/18/2019    CO2 26 02/18/2019    TSH 1.68 06/01/2018    HGBA1C 6.8 (H) 02/14/2019    MICROALBUR 2.12 (H) 02/06/2014       Vital signs:  Vitals:    03/11/19 1313   BP: 120/77   Pulse: 77   Resp: 18   Temp: 97.4  F (36.3  C)         Allergies: Patient has no known allergies.         Medications:       Current Outpatient Medications   Medication Sig     acetaminophen (TYLENOL) 500 MG tablet Take 1,000 mg by mouth 2 (two) times a day.     acetaminophen (TYLENOL) 500 MG tablet Take 500 mg by mouth every 4 (four) hours as needed for pain.     allopurinol (ZYLOPRIM) 100 MG  tablet Take 100 mg by mouth daily.      ARIPiprazole (ABILIFY) 10 MG tablet Take 1 tablet (10 mg total) by mouth daily.     buPROPion (WELLBUTRIN XL) 150 MG 24 hr tablet Take 1 tablet (150 mg total) by mouth every morning.     furosemide (LASIX) 20 MG tablet Take 10 mg by mouth every other day .           glucose 4 GM chewable tablet Chew 16 g daily as needed for low blood sugar.     insulin detemir U-100 (LEVEMIR) 100 unit/mL injection Inject 18 Units under the skin at bedtime.     nystatin (MYCOSTATIN) powder Apply 1 application topically 3 (three) times a day as needed.      triamcinolone (KENALOG) 0.1 % lotion Apply 1 application topically 2 (two) times a day as needed. Apply small amount to upper arms and back as needed.            Review of Systems:    As per history of present illness, otherwise reminder of review of  systems is negative for: General, eyes, ears, nose, throat, neck, respiratory, cardiovascular, gastrointestinal, genitourinary, meniscal skeletal, neurological, hematological and endocrine system.    Coordination of Care:   More than 25 minutes spent on this visit  with more than 50% of time spent on coordination of care including: coordinating care with the nurse, reviewing and fillling out  facility forms, psychoeducation,  providing supportive therapy regarding above issues, discussing her care with her son..     This note is created with  the help of dictation system.  All grammatical and typing errors or context distortion are  unintentional and inherent to software.    Flor Barrios MD

## 2021-06-24 NOTE — PROGRESS NOTES
Code Status:  DNR/DNI  Visit Type: Problem Visit     Facility:  [facility] [[id number]]         Facility Type:  (Long Term Care, LTC)    History of Present Illness: Roseanna Mcmahan is a 87 y.o. female who I am seeing today for follow up of recent UTI.   Pt with hx of diabetes type II, hypertension, bipolar depression, anemia of chronic disease, chronic kidney disease and LE edema. DJD of the knees. Nursing reporting increased confusion over the last 2 days with pt appearing more sleepy.  Recent increased behaviors as well as confusion.  UA UC obtained.  UC showed no growth however UA showed trace blood, trace leukocytes and moderate bacteria.  Patient also had low-grade temp.  She was treated with nitrofurantoin.  She continues on this.  Today with low-grade temp in the records.  Heart rate 120.  Patient denies any dysuria.  I find her ambulating in the hallway.  She says she is drinking well.  Nursing staff reports that she was wandering in and out of other resident's rooms earlier this a.m.  This is not like her.      Active Ambulatory Problems     Diagnosis Date Noted     Cerumen Impaction      (Lower) Leg Localized Swelling Bilateral      Bipolar Disorder      Cellulitis      Type 2 Diabetes Mellitus      Chronic Kidney Disease With Benign Hypertension      Osteoarthritis Of The Knee      Chronic Kidney Disease, Stage 4      Bipolar I disorder, most recent episode (or current) depressed, mild 08/11/2014     Adjustment disorder with mixed anxiety and depressed mood 08/11/2014     Bipolar I disorder, most recent episode (or current) depressed, mild 10/13/2014     Bipolar I disorder, most recent episode (or current) depressed, in partial or unspecified remission 04/13/2015     Intertrigo 12/14/2015     Resolved Ambulatory Problems     Diagnosis Date Noted     No Resolved Ambulatory Problems     Past Medical History:   Diagnosis Date     (Lower) Leg Localized Swelling Bilateral      Adjustment  disorder with mixed anxiety and depressed mood 8/11/2014     Bipolar Disorder      Bipolar I disorder, most recent episode (or current) depressed, in partial or unspecified remission 4/13/2015     Bipolar I disorder, most recent episode (or current) depressed, mild 8/11/2014     Cellulitis      Cerumen Impaction      Chronic Kidney Disease With Benign Hypertension      Chronic Kidney Disease, Stage 4      Intertrigo 12/14/2015     Osteoarthritis Of The Knee      Type 2 Diabetes Mellitus      Meds reviewed at facility.       No Known Allergies    Review of Systems   Low-grade temp noted in the records. No headache, lightheadedness or dizziness. No SOB, chest pains or palpitations. No nausea, vomiting, constipation or diarrhea.  Patient denies any dysuria, frequency, burning or pain with urination. Otherwise review of systems are negative.         PHYSICAL EXAMINATION:  General: Patient seen ambulating in the hallway.  Pleasant.    VS: /83, P 70, R 20, T 99.0, O2 sat 95% RA.  Weight 186 pounds.  HEENT:moist oral mucosa.  Severe hard of hearing.  No aids.  She does wear dentures.  ABDOMEN: Obese.    EXTREMITIES: Good range of motion on both upper and lower extremities, trace pedal edema, no calf tenderness.  Ambulating with a rolling walker.  NEUROLOGIC: Intact  PSYCHIATRIC: Mild cognitive impairment.  She does remember who I am.    Assessment and Plan:  1. Confusion     2. Urinary tract infection with hematuria, site unspecified       Patient with recent increased confusion.  UA UC was obtained.  UC showed no growth.  Was treated for UTI secondary to moderate bacteria seen on urinalysis as well as patient had a low-grade temperature.  She continues on nitrofurantoin.  She denies any dysuria burning or pain.  However, later noted in the records that she continues with low-grade temp and now has tachycardia.  We will repeat a UA UC.  I will also give her 1 g of IM Rocephin with lidocaine in the event she is trying  to go septic.  She does have underlying chronic kidney disease.  Creatinine stable at 1.9.  Slightly dry.  Will encourage fluids.  Will obtain CBC in the a.m.       Electronically signed by: Kim Reynolds CNP

## 2021-06-24 NOTE — PROGRESS NOTES
"Code Status:  DNR/DNI  Visit Type: Problem Visit     Facility:  [facility] [[id number]]         Facility Type:  (Long Term Care, LTC)    History of Present Illness: Roseanna Mcmahan is a 87 y.o. female who I am seeing today at the request of the nurses.  Pt with hx of diabetes type II, hypertension, bipolar depression, anemia of chronic disease, chronic kidney disease and LE edema. DJD of the knees. Nursing reporting increased confusion over the last 2 days with pt appearing more sleepy. She also has been spitting out her meds behind her bed and chair. Today pt sitting up in bedside chair. She is very sleepy but arouses. She denies any fever or chills. We discussed possible bladder infection. She was treated for UTI back in December. She denies any symptoms. She reports she was up all night due to her room mate. She tells me \"she does not want to take all those pills.\" I did review her med list with her. Pt does have hx of bipolar disorder. She continues on Abilify and Wellbutrin.     Active Ambulatory Problems     Diagnosis Date Noted     Cerumen Impaction      (Lower) Leg Localized Swelling Bilateral      Bipolar Disorder      Cellulitis      Type 2 Diabetes Mellitus      Chronic Kidney Disease With Benign Hypertension      Osteoarthritis Of The Knee      Chronic Kidney Disease, Stage 4      Bipolar I disorder, most recent episode (or current) depressed, mild 08/11/2014     Adjustment disorder with mixed anxiety and depressed mood 08/11/2014     Bipolar I disorder, most recent episode (or current) depressed, mild 10/13/2014     Bipolar I disorder, most recent episode (or current) depressed, in partial or unspecified remission 04/13/2015     Intertrigo 12/14/2015     Resolved Ambulatory Problems     Diagnosis Date Noted     No Resolved Ambulatory Problems     Past Medical History:   Diagnosis Date     (Lower) Leg Localized Swelling Bilateral      Adjustment disorder with mixed anxiety and " depressed mood 8/11/2014     Bipolar Disorder      Bipolar I disorder, most recent episode (or current) depressed, in partial or unspecified remission 4/13/2015     Bipolar I disorder, most recent episode (or current) depressed, mild 8/11/2014     Cellulitis      Cerumen Impaction      Chronic Kidney Disease With Benign Hypertension      Chronic Kidney Disease, Stage 4      Intertrigo 12/14/2015     Osteoarthritis Of The Knee      Type 2 Diabetes Mellitus      Meds reviewed at facility.       No Known Allergies    Review of Systems   No fevers or chills. No headache, lightheadedness or dizziness. No SOB, chest pains or palpitations. No nausea, vomiting, constipation or diarrhea. No dysuria, frequency, burning or pain with urination.She denies any pain in foot. She reports being up all night due to her room mate. Otherwise review of systems are negative.         PHYSICAL EXAMINATION:  General: Sleepy, she does arouse. She is sitting up in her recliner.    VS: /81, heart rate 70, respirations 16 O2 sat 96%.  Weight 186 pounds.  HEENT:moist oral mucosa.  Severe hard of hearing.  No aids.  She does wear dentures.  CARDIOVASCULAR: S1-S2 without murmur gallop.  RESPIRATORY: NoWheezes rales or rhonchi.  ABDOMEN: Obese.  Nontender with positive bowel sounds.  EXTREMITIES: Good range of motion on both upper and lower extremities, trace pedal edema, no calf tenderness.   SKIN: Right foot with mild scaly dryness. No redness or warmth.   NEUROLOGIC: Intact, pulses palpable  PSYCHIATRIC: Mild cognitive impairment.       Assessment and Plan:  1. Confusion     2. Bipolar affective disorder, remission status unspecified (H)       Pt with recent increased confusion. Obtain UA/UC to rule out bladder infection.She does have bipolar disorder. She continues on Abilify and Wellbutrin. She was up all night. Will need to monitor for signs of laura. DM satisfactory controlled. Will follow up with CBC, BMP and hemoglobin A1c in am.      Electronically signed by: Kim Reynolds, CNP

## 2021-06-26 NOTE — PROGRESS NOTES
Progress Notes by Lupe Choi NP at 11/8/2018  2:20 PM     Author: Lupe Choi NP Service: -- Author Type: Nurse Practitioner    Filed: 11/8/2018  4:07 PM Encounter Date: 11/8/2018 Status: Signed    : Lupe Choi NP (Nurse Practitioner)       [facility] Consult Note    Date of Service:  11/8/2018    Requesting Provider: Dr. Kim Reynolds    Chief Complaint: right foot rash and right foot wound    History of Present Illness: Roseanna Mcmahan is being seen at the [facility] today regarding right foot rash and wound. They arrive to the clinic today with son. The patient is a poor historian much of the information is obtained through chart review and the son. The patient reports that the rash and wound started a few months ago; they have tried a variety of creams and continues to worsen. They have tried antifungal mixed with calmoseptime and bacitracin; has received IM rocephin; culture grew out staph; sensitive to clindamycin. Has also used bactrim, levaquin. Has also tried soaking in epsom salts twice per day.  Was previously using calcium alginate; foam and rolled gauze; changing twice per day. Reports pain of 3/10 sore itching right foot; currently using apap for pain. Has used nothing as compression in the past. Denies any fevers, chills, or generalized ill feeling. She is currently on clindamycin tolerating fairly well have some mild diarrhea.  Denies history of cancer. Sleeps in a bed with legs elevated. During the day her legs are down dependent . Denies history of DVT, joint replacement and vein procedures. Positive history of cellulitis. She has diabetes; they are checking her fs; she does not know what type of numbers they are getting; last a1c was 6.7%.  She is minimally ambulatory arrives today in a wheelchair.      Review of Systems:   Constitutional:  negative  for fever, chills or night sweats  EENTM: positive for  glasses;  positive [place]  GI:  negative for nausea/vomiting;  negative for constipation  positive diarrhea;  negative for fecal incontinence negative weight loss  :  negative dysuria, positive incontinence  MS: patient is not ambulatory;  does use assistive devices  Cardiac:  negative   Respiratory:  negative SOB  Endocrine:  positive diabetes  Psych:  positive depression/anxiety    Past Medical History:    Past Medical History:   Diagnosis Date   ? (Lower) Leg Localized Swelling Bilateral     Created by Conversion    ? Adjustment disorder with mixed anxiety and depressed mood 8/11/2014   ? Bipolar Disorder     Created by Conversion  Replacement Utility updated for latest IMO load   ? Bipolar I disorder, most recent episode (or current) depressed, in partial or unspecified remission 4/13/2015   ? Bipolar I disorder, most recent episode (or current) depressed, mild 8/11/2014     Replacement Utility updated for latest IMO load   ? Cellulitis     Created by Conversion    ? Cerumen Impaction     Created by Conversion    ? Chronic Kidney Disease With Benign Hypertension     Created by Conversion    ? Chronic Kidney Disease, Stage 4     Created by Conversion    ? Intertrigo 12/14/2015   ? Osteoarthritis Of The Knee     Created by Conversion  Replacement Utility updated for latest IMO load   ? Type 2 Diabetes Mellitus     Created by Conversion        Surgical History:   Past Surgical History:   Procedure Laterality Date   ? wrist surgery Left         Medications:    Current Outpatient Prescriptions:   ?  acetaminophen (TYLENOL) 500 MG tablet, Take 1,000 mg by mouth 2 (two) times a day., Disp: , Rfl:   ?  acetaminophen (TYLENOL) 500 MG tablet, Take 500 mg by mouth every 4 (four) hours as needed for pain., Disp: , Rfl:   ?  allopurinol (ZYLOPRIM) 100 MG tablet, Take 100 mg by mouth daily. , Disp: , Rfl:   ?  ARIPiprazole (ABILIFY) 10 MG tablet, Take 1 tablet (10 mg total) by mouth daily., Disp: 30 tablet, Rfl: 2  ?   "buPROPion (WELLBUTRIN XL) 150 MG 24 hr tablet, Take 1 tablet (150 mg total) by mouth every morning., Disp: 30 tablet, Rfl: 5  ?  clindamycin (CLEOCIN) 300 MG capsule, Take 300 mg by mouth every 12 (twelve) hours. , Disp: , Rfl:   ?  furosemide (LASIX) 20 MG tablet, Take 10 mg by mouth every morning. , Disp: , Rfl:   ?  glucose 4 GM chewable tablet, Chew 16 g daily as needed for low blood sugar., Disp: , Rfl:   ?  insulin detemir U-100 (LEVEMIR) 100 unit/mL injection, Inject 18 Units under the skin at bedtime., Disp: , Rfl:   ?  insulin syringe-needle U-100 (INSULIN SYRINGE) 1/2 mL 30 x 5/16\" Syrg, Use 1 unit marking on U-100 syringe As Directed 4 (four) times a day., Disp: 1 each, Rfl: 3  ?  nystatin (MYCOSTATIN) powder, Apply 1 application topically 3 (three) times a day as needed. , Disp: , Rfl:   ?  nystatin (MYCOSTATIN) powder, Apply 1 application topically daily., Disp: , Rfl:   ?  triamcinolone (KENALOG) 0.1 % lotion, Apply 1 application topically 2 (two) times a day as needed. Apply small amount to upper arms and back as needed., Disp: , Rfl:     Allergies: No Known Allergies    Family history:   Family History   Problem Relation Age of Onset   ? Diabetes Sister    ? Cancer Sister    ? Diabetes Brother    ? Cancer Brother    ? Diabetes Son    ? Diabetes Sister    ? Cancer Son      colon cancer        Social History:   Social History     Social History   ? Marital status:      Spouse name: N/A   ? Number of children: N/A   ? Years of education: N/A     Occupational History   ? Not on file.     Social History Main Topics   ? Smoking status: Former Smoker     Types: Cigarettes     Start date: 5/23/1949     Quit date: 5/23/1990   ? Smokeless tobacco: Never Used   ? Alcohol use Yes      Comment: rare   ? Drug use: No   ? Sexual activity: Not on file     Other Topics Concern   ? Not on file     Social History Narrative        Physical Exam  Vitals: Blood pressure 142/78, pulse 72, temperature 97.8  F (36.6 "  C), temperature source Oral, resp. rate 20.  General: This is a 87 y.o. female who appears their reported age, not in acute distress  Head: normocephalic, Atraumatic; wearing glasses; non-icteric sclera; no exudate; moderate hearing loss   Respiratory: Clear throughout all lung fields; unlabored breathing; no cough   Cardiac: Regular, Rate and Rhythm, no murmurs appreciated   Skin: Uniformly warm and dry  Psych: Alert and oriented x3; calm and cooperative throughout exam  Extremities: right foot dorsal region with extensive erythema which is not warm to touch; appears scalded; there is extensive thickened, scaling, peeling skin on the toes and plantar foot; there are x3 fissures on the plantar right hallus; this was pared down using a scalpel; webbing space is macerated; strength testing revealed 3/4 to BLEs.    Sensation: Decreased to pinprick and light touch in a stocking distribution bilaterally     Peripheral Vascular: abnormal dorsalis pedis, posterior tibial pulses to bilateral feet , using a handheld doppler these were muted; difficult to find and biphasic in nature.  Good capillary refill. No unusual venous distention. Negative for spider veins, telangiectasias, hemosiderin deposition or hyperpigmentation and fibrosis or scarring    Circumferential volume measures:    Vasc Edema 11/8/2018   Right just above MTP 25   Right Ankle 23.2   Right Widest Calf 39.8   Right Thigh Up 10cm 51.6   Left - just above MTP 21.2   Left Ankle 23.1   Left Widest Calf 39.5   Left Thigh Up 10cm 53       Ulceration(s)/Wound(s):     VASC Wound 11/08/18 Rt foot (Active)       Laboratory studies:   Lab Results   Component Value Date    SEDRATE 76 (H) 11/01/2018     Lab Results   Component Value Date    CREATININE 1.40 (H) 10/29/2018     Lab Results   Component Value Date    HGBA1C 6.7 (H) 09/06/2018     Lab Results   Component Value Date    BUN 28 10/29/2018     Lab Results   Component Value Date    ALBUMIN 3.5 09/17/2015      Vitamin D, Total (25-Hydroxy)   Date Value Ref Range Status   08/05/2014 47.2 30.0 - 80.0 ng/mL Final       11/8/18 right foot          Impression:   Right foot cellulitis  Type 2 diabetes with foot ulcer; peripheral neuropathy; and possible circulatory issues  Abnormal pedal pulse  Dependent edema  Right foot xerosis with fissuring    Assessment/Plan:  1. Excisional debridement of all the ulcer(s) was recommended today. After consent was obtained and 2% Lidocaine HCL jelly was applied all of the ulcers were excisionally debrided using a sterile curet under clean condition the epidermal and dermal  were sharply debrided for a total square cm of less than 2. Devitalized and non viable tissue was removed to improve granulation tissue formation, stimulate wound healing, decrease overall bacteria load, disrupt biofilm formation and decrease edge senescence. Patient tolerated this well and the ulcers appeared much  afterwards.    2. Edema. Will apply light tubigrip and elevation for now; we will check JONNATHAN due to the fissuring and type 2 diabetes as well as abnormal muted pulses on exam today    3. Treatment: wound treatment will include irrigation and dressings to promote autolytic debridement which will include: stop the foot soaks; wash the foot with dilute hibiclens solution; alcohol wipes to the webbing space; apply am lactin to the thickened skin areas; apply gent/bact to the open weeping areas; cover with oil emulsion; ABD, rolled gauze; change twice per day    4. Offloading: keep direct pressure off the area; ok for gripper socks    5. Nutrition: tight glucose control    Patient to return to clinic in 3 week(s) for re-evaluation. They were instructed to call the clinic sooner with any further questions or concerns. Answered all questions.    Lupe Choi DNP, RN, CNP, [facility]  [phone]      This note was electronically signed by Lupe Choi

## 2021-06-26 NOTE — PROGRESS NOTES
Progress Notes by Lupe Choi NP at 11/29/2018  2:40 PM     Author: Lupe Choi NP Service: -- Author Type: Nurse Practitioner    Filed: 11/29/2018  3:00 PM Encounter Date: 11/29/2018 Status: Signed    : Lupe Choi NP (Nurse Practitioner)       Follow up Vascular Visit       Date of Service:11/29/2018    Date Last Seen: 11/16/2018; 11/16/2018    Chief Complaint: right foot rash and wound    History:   Past Medical History:   Diagnosis Date   ? (Lower) Leg Localized Swelling Bilateral     Created by Conversion    ? Adjustment disorder with mixed anxiety and depressed mood 8/11/2014   ? Bipolar Disorder     Created by Conversion  Replacement Utility updated for latest IMO load   ? Bipolar I disorder, most recent episode (or current) depressed, in partial or unspecified remission 4/13/2015   ? Bipolar I disorder, most recent episode (or current) depressed, mild 8/11/2014     Replacement Utility updated for latest IMO load   ? Cellulitis     Created by Conversion    ? Cerumen Impaction     Created by Conversion    ? Chronic Kidney Disease With Benign Hypertension     Created by Conversion    ? Chronic Kidney Disease, Stage 4     Created by Conversion    ? Intertrigo 12/14/2015   ? Osteoarthritis Of The Knee     Created by Conversion  Replacement Utility updated for latest IMO load   ? Type 2 Diabetes Mellitus     Created by Conversion        Pt returns to the [facility] [facility] with regards to their right foot rash and wound. We last saw her 3 weeks ago. They arrive today with son. We had her stop all the previous treatments and creams and instead went to washing the foot with dilute hibiclens; alcohol wipes to the webbing space; am lactin to the thickened skin areas; gent/bact to the weeping areas; covering with oil emulsion and ABD; rolled gauze doing this twice per day. She is using single tubigrip for compression. She is feeling well . We obtained an  "andrea and this was wnl; mildly decreased on the right.    Allergies: Patient has no known allergies.    Physical Exam:    /60   Pulse 72   Temp 98.5  F (36.9  C)   Resp 18   Ht 4' 11\" (1.499 m)   Wt 180 lb 1.6 oz (81.7 kg)   BMI 36.38 kg/m      General:  Patient presents to clinic in no apparent distress.  Head: normocephalic atraumatic  Psychiatric:  Alert and oriented x3.   Respiratory: unlabored breathing; no cough  Integumentary:  Skin is uniformly warm, dry and pink.    Extremities: right dorsal foot with faint erythema and irritation; mildly flaking skin; no longer with weeping; not warm to touch; thickened skin on the plantar foot is much improved; no longer with fissures; webbing clear; swelling is improved      Circumferential volume measures:    Vasc Edema 11/8/2018 11/29/2018   Right just above MTP 25 21.8   Right Ankle 23.2 21.5   Right Widest Calf 39.8 37.4   Right Thigh Up 10cm 51.6 50.2   Left - just above MTP 21.2 21.3   Left Ankle 23.1 23.3   Left Widest Calf 39.5 37.2   Left Thigh Up 10cm 53 51       Ulceration(s)/Wound(s):     VASC Wound 11/08/18 Rt foot (Active)        Lab Values    Lab Results   Component Value Date    SEDRATE 76 (H) 11/01/2018     Lab Results   Component Value Date    CREATININE 1.40 (H) 10/29/2018     Lab Results   Component Value Date    HGBA1C 6.7 (H) 09/06/2018     Lab Results   Component Value Date    BUN 28 10/29/2018     Lab Results   Component Value Date    ALBUMIN 3.5 09/17/2015     Vitamin D, Total (25-Hydroxy)   Date Value Ref Range Status   08/05/2014 47.2 30.0 - 80.0 ng/mL Final             Impression:  1. Fissure in skin of foot     2. Cellulitis of right lower extremity     3. Type 2 diabetes mellitus with other skin complication, unspecified whether long term insulin use (H)         11/29/18 right foot         11/8/18 right foot            Are any of these wounds new today: No; Location: na    Assessment/Plan:          1. Debridement: na           2. " Edema: continue tubigrip and elevation. The compression wraps were applied today in clinic.           3.  Wound treatment: wound treatment will include irrigation and dressings to promote autolytic debridement which will include:wash the right foot with dilute hibiclens, pat dry; apply am lactin lotion to the thickened skin areas; apply TCM ointment to the red rash twice per day on the foot           4. Nutrition: diabetic           5. Offloading: gripper sock     Patient will follow up with me in 2 weeks for reevaluation. They were instructed to call the clinic sooner with any signs or symptoms of infection or any further questions/concerns. Answered all questions.    Lupe Choi DNP, RN, CNP, [facility] [place]  [phone]        This note was electronically signed by Lupe Choi

## 2021-06-27 NOTE — PROGRESS NOTES
Progress Notes by Lupe Choi NP at 12/13/2018  2:40 PM     Author: Lupe Choi NP Service: -- Author Type: Nurse Practitioner    Filed: 12/13/2018  3:05 PM Encounter Date: 12/13/2018 Status: Signed    : Lupe Choi NP (Nurse Practitioner)       Follow up Vascular Visit       Date of Service:12/13/2018    Date Last Seen: 11/16/2018; 11/16/2018    Chief Complaint: right foot rash and wound    History:   Past Medical History:   Diagnosis Date   ? (Lower) Leg Localized Swelling Bilateral     Created by Conversion    ? Adjustment disorder with mixed anxiety and depressed mood 8/11/2014   ? Bipolar Disorder     Created by Conversion  Replacement Utility updated for latest IMO load   ? Bipolar I disorder, most recent episode (or current) depressed, in partial or unspecified remission 4/13/2015   ? Bipolar I disorder, most recent episode (or current) depressed, mild 8/11/2014     Replacement Utility updated for latest IMO load   ? Cellulitis     Created by Conversion    ? Cerumen Impaction     Created by Conversion    ? Chronic Kidney Disease With Benign Hypertension     Created by Conversion    ? Chronic Kidney Disease, Stage 4     Created by Conversion    ? Intertrigo 12/14/2015   ? Osteoarthritis Of The Knee     Created by Conversion  Replacement Utility updated for latest IMO load   ? Type 2 Diabetes Mellitus     Created by Conversion        Pt returns to the [facility] [facility] with regards to their right foot rash and wound. We last saw her 3 weeks ago. They arrive today with son. We had her initially stop all the previous treatments and creams and instead went to washing the foot with dilute hibiclens; alcohol wipes to the webbing space; am lactin to the thickened skin areas; gent/bact to the weeping areas; covering with oil emulsion and ABD; rolled gauze doing this twice per day. She is using single tubigrip for compression. She is feeling well . We  obtained an andrea previously and this was wnl; mildly decreased on the right.    Allergies: Patient has no known allergies.    Physical Exam:    /88   Pulse 64   Temp 97.9  F (36.6  C)   Resp 16   Wt 180 lb (81.6 kg) Comment: per pt son  BMI 36.36 kg/m      General:  Patient presents to clinic in no apparent distress.  Head: normocephalic atraumatic  Psychiatric:  Alert and oriented x3.   Respiratory: unlabored breathing; no cough  Integumentary:  Skin is uniformly warm, dry and pink.    Extremities: right dorsal foot with faint erythema and irritation; mildly flaking skin; no longer with weeping; not warm to touch; thickened skin on the plantar foot is much improved; no longer with fissures; webbing clear; swelling is improved      Circumferential volume measures:    Vasc Edema 11/8/2018 11/29/2018 12/13/2018   Right just above MTP 25 21.8 21.2   Right Ankle 23.2 21.5 20.9   Right Widest Calf 39.8 37.4 36.9   Right Thigh Up 10cm 51.6 50.2 49.7   Left - just above MTP 21.2 21.3 20.9   Left Ankle 23.1 23.3 21.3   Left Widest Calf 39.5 37.2 36.3   Left Thigh Up 10cm 53 51 51.3       Ulceration(s)/Wound(s):     VASC Wound 11/08/18 Rt foot (Active)        Lab Values    Lab Results   Component Value Date    SEDRATE 76 (H) 11/01/2018     Lab Results   Component Value Date    CREATININE 1.83 (H) 12/07/2018     Lab Results   Component Value Date    HGBA1C 6.7 (H) 09/06/2018     Lab Results   Component Value Date    BUN 31 (H) 12/07/2018     Lab Results   Component Value Date    ALBUMIN 3.5 09/17/2015     Vitamin D, Total (25-Hydroxy)   Date Value Ref Range Status   08/05/2014 47.2 30.0 - 80.0 ng/mL Final             Impression:  1. Fissure in skin of foot     2. Cellulitis of right lower extremity     3. Type 2 diabetes mellitus with other skin complication, unspecified whether long term insulin use (H)         11/29/18 right foot         11/8/18 right foot            Are any of these wounds new today: No;  Location: na    Assessment/Plan:          1. Debridement: na           2. Edema: continue tubigrip and elevation. The compression wraps were applied today in clinic.           3.  Wound treatment: lotion daily recommend cetaphil or sarna           4. Nutrition: diabetic           5. Offloading: gripper sock or sock and shoe     Patient will follow up with me PRN for reevaluation. They were instructed to call the clinic sooner with any signs or symptoms of infection or any further questions/concerns. Answered all questions.    Lupe Choi DNP, RN, CNP, CWN  [facility]  [phone]        This note was electronically signed by Lupe Choi

## 2021-07-03 NOTE — ADDENDUM NOTE
Addendum Note by Flores Briones MD at 6/26/2018  3:08 PM     Author: Flores Briones MD Service: -- Author Type: Physician    Filed: 6/26/2018  3:08 PM Encounter Date: 6/22/2018 Status: Signed    : Flores Briones MD (Physician)    Addended by: FLORES BRIONES on: 6/26/2018 03:08 PM        Modules accepted: Orders

## 2021-07-13 ENCOUNTER — RECORDS - HEALTHEAST (OUTPATIENT)
Dept: ADMINISTRATIVE | Facility: CLINIC | Age: 86
End: 2021-07-13

## 2021-07-21 ENCOUNTER — RECORDS - HEALTHEAST (OUTPATIENT)
Dept: ADMINISTRATIVE | Facility: CLINIC | Age: 86
End: 2021-07-21

## 2023-11-06 NOTE — PROGRESS NOTES
Code Status:  DNR/DNI  Visit Type: Problem Visit     Facility:  [facility] [[id number]]         Facility Type:  (Long Term Care, LTC)    History of Present Illness: Roseanna Mcmahan is a 87 y.o. female who I am seeing today for follow-up of right foot wounds.  Pt with hx of diabetes type II, hypertension, bipolar depression, anemia of chronic disease, chronic kidney disease and LE edema. DJD of the knees.  Patient with chronic nonhealing wound to the top of the right foot.  She was initially treated for athlete's foot.  She does have history of diabetes.  Has been very moist.  She is continued with some redness and warmth and oozing.  Wound culture was obtained.  This culture was positive for staph aureus.  She was started on Keflex 2 times daily.  She continues with Epsom salt soaks as well as antifungal topically.    Active Ambulatory Problems     Diagnosis Date Noted     Cerumen Impaction      (Lower) Leg Localized Swelling Bilateral      Bipolar Disorder      Cellulitis      Type 2 Diabetes Mellitus      Chronic Kidney Disease With Benign Hypertension      Osteoarthritis Of The Knee      Chronic Kidney Disease, Stage 4      Bipolar I disorder, most recent episode (or current) depressed, mild 08/11/2014     Adjustment disorder with mixed anxiety and depressed mood 08/11/2014     Bipolar I disorder, most recent episode (or current) depressed, mild 10/13/2014     Bipolar I disorder, most recent episode (or current) depressed, in partial or unspecified remission 04/13/2015     Intertrigo 12/14/2015     Resolved Ambulatory Problems     Diagnosis Date Noted     No Resolved Ambulatory Problems     No Additional Past Medical History     Meds reviewed at facility.       No Known Allergies    Review of Systems   Patient denies any pain in her foot.  Reports increased itching of the right foot. Swelling less than usual.    PHYSICAL EXAMINATION:  General: Awake, Alert, sitting up in recliner.   Mohs surgery operative note    Indications for Mohs Micrographic Surgery: size, location, aggressive histologic growth pattern, clinically ill-defined borders and need for tissue conservation    Signed informed consent was obtained.      Referring Physician: Dr. Rivera  Premedication: none  Post op medication: none  Surgeon:   Munir Castro DO.  Assistant:   Technician: Sandrita Cruz / Libia Schmitt  Type of Tumor: BCC  Lesion: primary  Location: left jaw  Preoperative Size: 0.7 x 0.7 cm  Postoperative Size: 1.6 x 1.2 cm  Anesthesia: 1% Lidocaine with Epinephrine 1/882175 ) + 0.5 marcaine       Operation:   Surgical excision of cutaneous malignancy with continuous microscopic control (Mohs Micrographic Surgery).       Preparation: Prior biopsy on the above site was confirmed and pathology report obtained.  The patient confirmed the location of the biopsy site by looking into a mirror and pointing to the location.  The area of clinically apparent tumor was outlined using a surgical pen to define the margin.  The operative site was surgically prepped with betadine.  The area was then draped with a sterile drape and locally anesthetized.  This was done for each surgical stage of the procedure.     The lesion was removed by Mohs surgery, fresh-tissue technique in 1 stage with 1 section per stage. Stage 1(-).  All tissue, which was removed from the lesion site, was examined by frozen section technique with detailed mappings of the tissue and with Dr. Castro reading the slides in the manner of Mohs chemosurgery or microscopically controlled excision.  At the completion of the surgery, the deep and peripheral margins were free of tumor.     Repair:  Various closure modalities were discussed with the patient, and it was decided that an intermediate layered closure would best preserve normal anatomic and functional relationships. Additional risk of wound dehiscence was discussed.     The patient was placed on the operating    HEENT:moist oral mucosa.  Severe hard of hearing.  No aids.  She does wear dentures.  EXTREMITIES: Good range of motion on both upper and lower extremities, 1+pedal edema, no calf tenderness. Right foot with redness and peeling skin.  Less red than previous visit.  Area continues to be moist but is improving.  Lots of scaling skin removed.  She has loosening of the third toenail.  I did trim this back.  Open split on the back of the first metatarsal head.  NEUROLOGIC: Intact, pulses palpable  PSYCHIATRIC: Cognition intact.Pleasant affect.      Assessment and Plan:  1. Skin ulcer of right foot, limited to breakdown of skin (H)     2. Tinea pedis     3. Diabetic foot infection (H)       Patient with nonhealing ulcer of the right foot.  She has a history of diabetes.  She has been treated for athlete's foot.  She continues an Epsom salt foot soaks.  Wound culture was obtained which was positive for Staphylococcus aureus.  She continues on Keflex 500 mg twice daily.  Area is less red on today's visit.  Somewhat less drainage.  Will continue with topical treatment..  Continue with soaks.  She had a loose nail that I did trim on the third toe.  Currently afebrile.  We will follow-up with laboratory.        Electronically signed by: Kim Reynolds, JUANI      room table. The area was anesthetized with Lidocaine 1% with epinephrine with added sodium bicarbonate, was given a sterile prep using betadyne, and draped in the usual sterile fashion. Recreation and enlargement of the wound was performed by excising cones of tissue via the triangulation technique.    The final incision line was placed with respect for the patient's natural skin tension lines in a linear configuration to avoid functional and aesthetic distortion of adjacent free margins. After undermining the edges to decrease wound edge tension, meticulous hemostasis was obtained with spot electrocoagulation.  Subcutaneous dead space, dermis, and epidermis were closed with 5-0 Monocryl and 6-0 gut stitches.  The length of the final incision line was 3.6 cm.                Wound care reviewed  Call with any concerns  Sutures will dissolve  Follow up as needed